# Patient Record
Sex: FEMALE | Race: WHITE | Employment: OTHER | URBAN - METROPOLITAN AREA
[De-identification: names, ages, dates, MRNs, and addresses within clinical notes are randomized per-mention and may not be internally consistent; named-entity substitution may affect disease eponyms.]

---

## 2017-02-10 ENCOUNTER — OFFICE VISIT (OUTPATIENT)
Dept: ORTHOPEDIC SURGERY | Age: 77
End: 2017-02-10

## 2017-02-10 VITALS
SYSTOLIC BLOOD PRESSURE: 136 MMHG | WEIGHT: 137 LBS | DIASTOLIC BLOOD PRESSURE: 73 MMHG | BODY MASS INDEX: 23.52 KG/M2 | HEART RATE: 75 BPM

## 2017-02-10 DIAGNOSIS — M54.31 SCIATICA OF RIGHT SIDE: ICD-10-CM

## 2017-02-10 DIAGNOSIS — M17.11 PRIMARY OSTEOARTHRITIS OF RIGHT KNEE: ICD-10-CM

## 2017-02-10 DIAGNOSIS — M25.561 RIGHT KNEE PAIN, UNSPECIFIED CHRONICITY: Primary | ICD-10-CM

## 2017-02-10 PROCEDURE — G8484 FLU IMMUNIZE NO ADMIN: HCPCS | Performed by: PHYSICIAN ASSISTANT

## 2017-02-10 PROCEDURE — 3017F COLORECTAL CA SCREEN DOC REV: CPT | Performed by: PHYSICIAN ASSISTANT

## 2017-02-10 PROCEDURE — G8420 CALC BMI NORM PARAMETERS: HCPCS | Performed by: PHYSICIAN ASSISTANT

## 2017-02-10 PROCEDURE — 99203 OFFICE O/P NEW LOW 30 MIN: CPT | Performed by: PHYSICIAN ASSISTANT

## 2017-02-10 PROCEDURE — 4040F PNEUMOC VAC/ADMIN/RCVD: CPT | Performed by: PHYSICIAN ASSISTANT

## 2017-02-10 PROCEDURE — G8400 PT W/DXA NO RESULTS DOC: HCPCS | Performed by: PHYSICIAN ASSISTANT

## 2017-02-10 PROCEDURE — 1090F PRES/ABSN URINE INCON ASSESS: CPT | Performed by: PHYSICIAN ASSISTANT

## 2017-02-10 PROCEDURE — 1036F TOBACCO NON-USER: CPT | Performed by: PHYSICIAN ASSISTANT

## 2017-02-10 PROCEDURE — 1123F ACP DISCUSS/DSCN MKR DOCD: CPT | Performed by: PHYSICIAN ASSISTANT

## 2017-02-10 PROCEDURE — 3014F SCREEN MAMMO DOC REV: CPT | Performed by: PHYSICIAN ASSISTANT

## 2017-02-10 PROCEDURE — 73564 X-RAY EXAM KNEE 4 OR MORE: CPT | Performed by: PHYSICIAN ASSISTANT

## 2017-02-10 PROCEDURE — G8427 DOCREV CUR MEDS BY ELIG CLIN: HCPCS | Performed by: PHYSICIAN ASSISTANT

## 2017-02-10 RX ORDER — METHYLPREDNISOLONE 4 MG/1
TABLET ORAL
Qty: 1 KIT | Refills: 0 | Status: SHIPPED | OUTPATIENT
Start: 2017-02-10 | End: 2017-03-03 | Stop reason: ALTCHOICE

## 2017-02-10 RX ORDER — TIZANIDINE 4 MG/1
4 TABLET ORAL EVERY 8 HOURS PRN
Qty: 30 TABLET | Refills: 0 | Status: SHIPPED | OUTPATIENT
Start: 2017-02-10 | End: 2017-05-08

## 2017-02-15 ENCOUNTER — HOSPITAL ENCOUNTER (OUTPATIENT)
Dept: MRI IMAGING | Age: 77
Discharge: HOME | End: 2017-02-15

## 2017-02-15 DIAGNOSIS — M25.561 RIGHT KNEE PAIN, UNSPECIFIED CHRONICITY: ICD-10-CM

## 2017-02-27 ENCOUNTER — TELEPHONE (OUTPATIENT)
Dept: ORTHOPEDIC SURGERY | Age: 77
End: 2017-02-27

## 2017-02-27 RX ORDER — PREDNISONE 10 MG/1
TABLET ORAL
Qty: 35 TABLET | Refills: 1 | Status: SHIPPED | OUTPATIENT
Start: 2017-02-27 | End: 2017-05-08

## 2017-02-27 RX ORDER — METHYLPREDNISOLONE 4 MG/1
TABLET ORAL
Qty: 1 KIT | Refills: 0 | Status: SHIPPED | OUTPATIENT
Start: 2017-02-27 | End: 2017-03-05

## 2017-03-03 ENCOUNTER — OFFICE VISIT (OUTPATIENT)
Dept: ORTHOPEDIC SURGERY | Age: 77
End: 2017-03-03

## 2017-03-03 VITALS
DIASTOLIC BLOOD PRESSURE: 78 MMHG | SYSTOLIC BLOOD PRESSURE: 158 MMHG | WEIGHT: 136.91 LBS | HEIGHT: 64 IN | BODY MASS INDEX: 23.37 KG/M2 | HEART RATE: 57 BPM

## 2017-03-03 DIAGNOSIS — M25.561 ACUTE PAIN OF RIGHT KNEE: ICD-10-CM

## 2017-03-03 DIAGNOSIS — M54.16 LUMBAR RADICULOPATHY: ICD-10-CM

## 2017-03-03 DIAGNOSIS — M17.11 PRIMARY OSTEOARTHRITIS OF RIGHT KNEE: Primary | ICD-10-CM

## 2017-03-03 DIAGNOSIS — M22.2X1 PATELLOFEMORAL STRESS SYNDROME OF RIGHT KNEE: ICD-10-CM

## 2017-03-03 PROCEDURE — L1812 KO ELASTIC W/JOINTS PRE OTS: HCPCS | Performed by: PHYSICIAN ASSISTANT

## 2017-03-03 PROCEDURE — G8420 CALC BMI NORM PARAMETERS: HCPCS | Performed by: PHYSICIAN ASSISTANT

## 2017-03-03 PROCEDURE — 99213 OFFICE O/P EST LOW 20 MIN: CPT | Performed by: PHYSICIAN ASSISTANT

## 2017-03-03 PROCEDURE — 1090F PRES/ABSN URINE INCON ASSESS: CPT | Performed by: PHYSICIAN ASSISTANT

## 2017-03-03 PROCEDURE — G8427 DOCREV CUR MEDS BY ELIG CLIN: HCPCS | Performed by: PHYSICIAN ASSISTANT

## 2017-03-03 PROCEDURE — 1036F TOBACCO NON-USER: CPT | Performed by: PHYSICIAN ASSISTANT

## 2017-03-03 PROCEDURE — 4040F PNEUMOC VAC/ADMIN/RCVD: CPT | Performed by: PHYSICIAN ASSISTANT

## 2017-03-03 PROCEDURE — G8484 FLU IMMUNIZE NO ADMIN: HCPCS | Performed by: PHYSICIAN ASSISTANT

## 2017-03-03 PROCEDURE — 3017F COLORECTAL CA SCREEN DOC REV: CPT | Performed by: PHYSICIAN ASSISTANT

## 2017-03-03 PROCEDURE — 1123F ACP DISCUSS/DSCN MKR DOCD: CPT | Performed by: PHYSICIAN ASSISTANT

## 2017-03-03 PROCEDURE — G8400 PT W/DXA NO RESULTS DOC: HCPCS | Performed by: PHYSICIAN ASSISTANT

## 2017-03-03 PROCEDURE — 3014F SCREEN MAMMO DOC REV: CPT | Performed by: PHYSICIAN ASSISTANT

## 2017-03-10 ENCOUNTER — HOSPITAL ENCOUNTER (OUTPATIENT)
Dept: PHYSICAL THERAPY | Age: 77
Discharge: HOME | End: 2017-03-31
Admitting: ORTHOPAEDIC SURGERY

## 2017-03-17 ENCOUNTER — HOSPITAL ENCOUNTER (OUTPATIENT)
Dept: PHYSICAL THERAPY | Age: 77
Discharge: HOME | End: 2017-03-17
Admitting: ORTHOPAEDIC SURGERY

## 2017-03-21 ENCOUNTER — HOSPITAL ENCOUNTER (OUTPATIENT)
Dept: PHYSICAL THERAPY | Age: 77
Discharge: HOME | End: 2017-03-21
Admitting: ORTHOPAEDIC SURGERY

## 2017-03-23 ENCOUNTER — HOSPITAL ENCOUNTER (OUTPATIENT)
Dept: PHYSICAL THERAPY | Age: 77
Discharge: HOME | End: 2017-03-23
Admitting: ORTHOPAEDIC SURGERY

## 2017-03-28 ENCOUNTER — HOSPITAL ENCOUNTER (OUTPATIENT)
Dept: PHYSICAL THERAPY | Age: 77
Discharge: HOME | End: 2017-03-28
Admitting: ORTHOPAEDIC SURGERY

## 2017-03-31 ENCOUNTER — HOSPITAL ENCOUNTER (OUTPATIENT)
Dept: PHYSICAL THERAPY | Age: 77
Discharge: HOME | End: 2017-03-31
Admitting: ORTHOPAEDIC SURGERY

## 2017-04-04 ENCOUNTER — HOSPITAL ENCOUNTER (OUTPATIENT)
Dept: PHYSICAL THERAPY | Age: 77
Discharge: HOME | End: 2017-04-04
Admitting: ORTHOPAEDIC SURGERY

## 2017-04-06 ENCOUNTER — HOSPITAL ENCOUNTER (OUTPATIENT)
Dept: PHYSICAL THERAPY | Age: 77
Discharge: HOME | End: 2017-04-06
Admitting: ORTHOPAEDIC SURGERY

## 2017-04-11 ENCOUNTER — HOSPITAL ENCOUNTER (OUTPATIENT)
Dept: PHYSICAL THERAPY | Age: 77
Discharge: HOME | End: 2017-04-11
Admitting: ORTHOPAEDIC SURGERY

## 2017-04-13 ENCOUNTER — OFFICE VISIT (OUTPATIENT)
Dept: ORTHOPEDIC SURGERY | Age: 77
End: 2017-04-13

## 2017-04-13 DIAGNOSIS — M22.2X1 PATELLOFEMORAL STRESS SYNDROME OF RIGHT KNEE: ICD-10-CM

## 2017-04-13 DIAGNOSIS — M54.16 LUMBAR RADICULOPATHY: Primary | ICD-10-CM

## 2017-04-13 DIAGNOSIS — M17.11 PRIMARY OSTEOARTHRITIS OF RIGHT KNEE: ICD-10-CM

## 2017-04-13 PROCEDURE — G8427 DOCREV CUR MEDS BY ELIG CLIN: HCPCS | Performed by: PHYSICIAN ASSISTANT

## 2017-04-13 PROCEDURE — 3017F COLORECTAL CA SCREEN DOC REV: CPT | Performed by: PHYSICIAN ASSISTANT

## 2017-04-13 PROCEDURE — G8400 PT W/DXA NO RESULTS DOC: HCPCS | Performed by: PHYSICIAN ASSISTANT

## 2017-04-13 PROCEDURE — 1090F PRES/ABSN URINE INCON ASSESS: CPT | Performed by: PHYSICIAN ASSISTANT

## 2017-04-13 PROCEDURE — 1123F ACP DISCUSS/DSCN MKR DOCD: CPT | Performed by: PHYSICIAN ASSISTANT

## 2017-04-13 PROCEDURE — G8420 CALC BMI NORM PARAMETERS: HCPCS | Performed by: PHYSICIAN ASSISTANT

## 2017-04-13 PROCEDURE — 1036F TOBACCO NON-USER: CPT | Performed by: PHYSICIAN ASSISTANT

## 2017-04-13 PROCEDURE — 20611 DRAIN/INJ JOINT/BURSA W/US: CPT | Performed by: PHYSICIAN ASSISTANT

## 2017-04-13 PROCEDURE — 99213 OFFICE O/P EST LOW 20 MIN: CPT | Performed by: PHYSICIAN ASSISTANT

## 2017-04-13 PROCEDURE — 4040F PNEUMOC VAC/ADMIN/RCVD: CPT | Performed by: PHYSICIAN ASSISTANT

## 2017-04-13 RX ORDER — DIAZEPAM 5 MG/1
TABLET ORAL
Qty: 1 TABLET | Refills: 0 | Status: SHIPPED | OUTPATIENT
Start: 2017-04-13 | End: 2017-04-13 | Stop reason: CLARIF

## 2017-04-13 RX ORDER — DIAZEPAM 5 MG/1
TABLET ORAL
Qty: 2 TABLET | Refills: 0 | Status: SHIPPED | OUTPATIENT
Start: 2017-04-13 | End: 2017-05-08

## 2017-04-21 ENCOUNTER — HOSPITAL ENCOUNTER (OUTPATIENT)
Dept: MRI IMAGING | Age: 77
Discharge: HOME | End: 2017-04-21

## 2017-04-21 DIAGNOSIS — M54.16 LUMBAR RADICULOPATHY: ICD-10-CM

## 2017-04-24 ENCOUNTER — OFFICE VISIT (OUTPATIENT)
Dept: ORTHOPEDIC SURGERY | Age: 77
End: 2017-04-24

## 2017-04-24 VITALS
BODY MASS INDEX: 23.37 KG/M2 | SYSTOLIC BLOOD PRESSURE: 135 MMHG | HEIGHT: 64 IN | HEART RATE: 60 BPM | DIASTOLIC BLOOD PRESSURE: 69 MMHG | WEIGHT: 136.91 LBS

## 2017-04-24 DIAGNOSIS — M54.16 LUMBAR RADICULOPATHY: Primary | ICD-10-CM

## 2017-04-24 PROCEDURE — G8400 PT W/DXA NO RESULTS DOC: HCPCS | Performed by: ORTHOPAEDIC SURGERY

## 2017-04-24 PROCEDURE — 1123F ACP DISCUSS/DSCN MKR DOCD: CPT | Performed by: ORTHOPAEDIC SURGERY

## 2017-04-24 PROCEDURE — G8420 CALC BMI NORM PARAMETERS: HCPCS | Performed by: ORTHOPAEDIC SURGERY

## 2017-04-24 PROCEDURE — 3017F COLORECTAL CA SCREEN DOC REV: CPT | Performed by: ORTHOPAEDIC SURGERY

## 2017-04-24 PROCEDURE — 1090F PRES/ABSN URINE INCON ASSESS: CPT | Performed by: ORTHOPAEDIC SURGERY

## 2017-04-24 PROCEDURE — G8427 DOCREV CUR MEDS BY ELIG CLIN: HCPCS | Performed by: ORTHOPAEDIC SURGERY

## 2017-04-24 PROCEDURE — 4040F PNEUMOC VAC/ADMIN/RCVD: CPT | Performed by: ORTHOPAEDIC SURGERY

## 2017-04-24 PROCEDURE — 1036F TOBACCO NON-USER: CPT | Performed by: ORTHOPAEDIC SURGERY

## 2017-04-24 PROCEDURE — 99213 OFFICE O/P EST LOW 20 MIN: CPT | Performed by: ORTHOPAEDIC SURGERY

## 2017-05-08 ENCOUNTER — OFFICE VISIT (OUTPATIENT)
Dept: ORTHOPEDIC SURGERY | Age: 77
End: 2017-05-08

## 2017-05-08 VITALS
BODY MASS INDEX: 21.34 KG/M2 | SYSTOLIC BLOOD PRESSURE: 126 MMHG | WEIGHT: 125 LBS | HEART RATE: 67 BPM | DIASTOLIC BLOOD PRESSURE: 61 MMHG | HEIGHT: 64 IN

## 2017-05-08 DIAGNOSIS — S39.012A LUMBAR STRAIN, INITIAL ENCOUNTER: Primary | ICD-10-CM

## 2017-05-08 PROCEDURE — G8419 CALC BMI OUT NRM PARAM NOF/U: HCPCS | Performed by: PHYSICAL MEDICINE & REHABILITATION

## 2017-05-08 PROCEDURE — 1090F PRES/ABSN URINE INCON ASSESS: CPT | Performed by: PHYSICAL MEDICINE & REHABILITATION

## 2017-05-08 PROCEDURE — G8400 PT W/DXA NO RESULTS DOC: HCPCS | Performed by: PHYSICAL MEDICINE & REHABILITATION

## 2017-05-08 PROCEDURE — 99214 OFFICE O/P EST MOD 30 MIN: CPT | Performed by: PHYSICAL MEDICINE & REHABILITATION

## 2017-05-08 PROCEDURE — 1036F TOBACCO NON-USER: CPT | Performed by: PHYSICAL MEDICINE & REHABILITATION

## 2017-05-08 PROCEDURE — 1123F ACP DISCUSS/DSCN MKR DOCD: CPT | Performed by: PHYSICAL MEDICINE & REHABILITATION

## 2017-05-08 PROCEDURE — 3017F COLORECTAL CA SCREEN DOC REV: CPT | Performed by: PHYSICAL MEDICINE & REHABILITATION

## 2017-05-08 PROCEDURE — 4040F PNEUMOC VAC/ADMIN/RCVD: CPT | Performed by: PHYSICAL MEDICINE & REHABILITATION

## 2017-05-08 PROCEDURE — G8427 DOCREV CUR MEDS BY ELIG CLIN: HCPCS | Performed by: PHYSICAL MEDICINE & REHABILITATION

## 2017-05-08 RX ORDER — MONTELUKAST SODIUM 10 MG/1
10 TABLET ORAL NIGHTLY
COMMUNITY
End: 2019-08-20 | Stop reason: SDUPTHER

## 2018-02-05 ENCOUNTER — OFFICE VISIT (OUTPATIENT)
Dept: ORTHOPEDIC SURGERY | Age: 78
End: 2018-02-05

## 2018-02-05 VITALS — HEIGHT: 64 IN | BODY MASS INDEX: 22.71 KG/M2 | WEIGHT: 133 LBS

## 2018-02-05 DIAGNOSIS — M17.11 PRIMARY OSTEOARTHRITIS OF RIGHT KNEE: Primary | ICD-10-CM

## 2018-02-05 DIAGNOSIS — M25.561 RIGHT KNEE PAIN, UNSPECIFIED CHRONICITY: ICD-10-CM

## 2018-02-05 PROCEDURE — G8420 CALC BMI NORM PARAMETERS: HCPCS | Performed by: PHYSICIAN ASSISTANT

## 2018-02-05 PROCEDURE — 4040F PNEUMOC VAC/ADMIN/RCVD: CPT | Performed by: PHYSICIAN ASSISTANT

## 2018-02-05 PROCEDURE — G8428 CUR MEDS NOT DOCUMENT: HCPCS | Performed by: PHYSICIAN ASSISTANT

## 2018-02-05 PROCEDURE — 20611 DRAIN/INJ JOINT/BURSA W/US: CPT | Performed by: PHYSICIAN ASSISTANT

## 2018-02-05 PROCEDURE — 3017F COLORECTAL CA SCREEN DOC REV: CPT | Performed by: PHYSICIAN ASSISTANT

## 2018-02-05 PROCEDURE — 1123F ACP DISCUSS/DSCN MKR DOCD: CPT | Performed by: PHYSICIAN ASSISTANT

## 2018-02-05 PROCEDURE — G8400 PT W/DXA NO RESULTS DOC: HCPCS | Performed by: PHYSICIAN ASSISTANT

## 2018-02-05 PROCEDURE — 99213 OFFICE O/P EST LOW 20 MIN: CPT | Performed by: PHYSICIAN ASSISTANT

## 2018-02-05 PROCEDURE — 1036F TOBACCO NON-USER: CPT | Performed by: PHYSICIAN ASSISTANT

## 2018-02-05 PROCEDURE — 1090F PRES/ABSN URINE INCON ASSESS: CPT | Performed by: PHYSICIAN ASSISTANT

## 2018-02-05 PROCEDURE — G8484 FLU IMMUNIZE NO ADMIN: HCPCS | Performed by: PHYSICIAN ASSISTANT

## 2018-08-20 ENCOUNTER — TELEPHONE (OUTPATIENT)
Dept: FAMILY MEDICINE CLINIC | Age: 78
End: 2018-08-20

## 2018-08-20 NOTE — TELEPHONE ENCOUNTER
Patient called wanting to be a patient with Dr ARMSTRONGRORO [facility]  She has a friend that told her that she had spoken with Dr ARMSTRONGRORO [facility] and Dr ARMSTRONGRORO [facility] ok'd it  Friends initials ROMI  Patient aware next new pt appt is in January

## 2019-01-29 ENCOUNTER — OFFICE VISIT (OUTPATIENT)
Dept: FAMILY MEDICINE CLINIC | Age: 79
End: 2019-01-29
Payer: MEDICARE

## 2019-01-29 VITALS
BODY MASS INDEX: 23.73 KG/M2 | HEART RATE: 61 BPM | WEIGHT: 139 LBS | HEIGHT: 64 IN | SYSTOLIC BLOOD PRESSURE: 134 MMHG | OXYGEN SATURATION: 97 % | DIASTOLIC BLOOD PRESSURE: 70 MMHG

## 2019-01-29 DIAGNOSIS — R73.09 ELEVATED GLUCOSE: ICD-10-CM

## 2019-01-29 DIAGNOSIS — M85.80 OSTEOPENIA, UNSPECIFIED LOCATION: ICD-10-CM

## 2019-01-29 DIAGNOSIS — F51.04 CHRONIC INSOMNIA: Primary | ICD-10-CM

## 2019-01-29 DIAGNOSIS — I10 ESSENTIAL HYPERTENSION: ICD-10-CM

## 2019-01-29 DIAGNOSIS — T75.3XXA MOTION SICKNESS, INITIAL ENCOUNTER: ICD-10-CM

## 2019-01-29 DIAGNOSIS — E78.2 MIXED HYPERLIPIDEMIA: ICD-10-CM

## 2019-01-29 DIAGNOSIS — N18.30 CHRONIC KIDNEY DISEASE, STAGE III (MODERATE) (HCC): ICD-10-CM

## 2019-01-29 DIAGNOSIS — Z78.0 POST-MENOPAUSAL: ICD-10-CM

## 2019-01-29 PROBLEM — E78.5 HYPERLIPIDEMIA: Status: ACTIVE | Noted: 2019-01-29

## 2019-01-29 PROBLEM — K21.9 GERD (GASTROESOPHAGEAL REFLUX DISEASE): Status: ACTIVE | Noted: 2019-01-29

## 2019-01-29 PROBLEM — J45.909 EXTRINSIC ASTHMA: Status: ACTIVE | Noted: 2019-01-29

## 2019-01-29 PROBLEM — K14.0 GLOSSITIS: Status: ACTIVE | Noted: 2017-03-16

## 2019-01-29 LAB
A/G RATIO: 2.2 (ref 1.1–2.2)
ALBUMIN SERPL-MCNC: 4.6 G/DL (ref 3.4–5)
ALP BLD-CCNC: 62 U/L (ref 40–129)
ALT SERPL-CCNC: 23 U/L (ref 10–40)
ANION GAP SERPL CALCULATED.3IONS-SCNC: 14 MMOL/L (ref 3–16)
AST SERPL-CCNC: 26 U/L (ref 15–37)
BASOPHILS ABSOLUTE: 0.1 K/UL (ref 0–0.2)
BASOPHILS RELATIVE PERCENT: 1 %
BILIRUB SERPL-MCNC: 0.3 MG/DL (ref 0–1)
BUN BLDV-MCNC: 24 MG/DL (ref 7–20)
CALCIUM SERPL-MCNC: 10.4 MG/DL (ref 8.3–10.6)
CHLORIDE BLD-SCNC: 100 MMOL/L (ref 99–110)
CHOLESTEROL, TOTAL: 169 MG/DL (ref 0–199)
CO2: 26 MMOL/L (ref 21–32)
CREAT SERPL-MCNC: 1.3 MG/DL (ref 0.6–1.2)
EOSINOPHILS ABSOLUTE: 0.2 K/UL (ref 0–0.6)
EOSINOPHILS RELATIVE PERCENT: 3.6 %
GFR AFRICAN AMERICAN: 48
GFR NON-AFRICAN AMERICAN: 40
GLOBULIN: 2.1 G/DL
GLUCOSE BLD-MCNC: 259 MG/DL (ref 70–99)
HCT VFR BLD CALC: 43.6 % (ref 36–48)
HDLC SERPL-MCNC: 31 MG/DL (ref 40–60)
HEMOGLOBIN: 14.6 G/DL (ref 12–16)
LDL CHOLESTEROL CALCULATED: ABNORMAL MG/DL
LDL CHOLESTEROL DIRECT: 107 MG/DL
LYMPHOCYTES ABSOLUTE: 1.7 K/UL (ref 1–5.1)
LYMPHOCYTES RELATIVE PERCENT: 32 %
MCH RBC QN AUTO: 29.5 PG (ref 26–34)
MCHC RBC AUTO-ENTMCNC: 33.6 G/DL (ref 31–36)
MCV RBC AUTO: 88 FL (ref 80–100)
MONOCYTES ABSOLUTE: 0.3 K/UL (ref 0–1.3)
MONOCYTES RELATIVE PERCENT: 6.6 %
NEUTROPHILS ABSOLUTE: 3 K/UL (ref 1.7–7.7)
NEUTROPHILS RELATIVE PERCENT: 56.8 %
PDW BLD-RTO: 14.4 % (ref 12.4–15.4)
PLATELET # BLD: 242 K/UL (ref 135–450)
PMV BLD AUTO: 7.9 FL (ref 5–10.5)
POTASSIUM SERPL-SCNC: 4 MMOL/L (ref 3.5–5.1)
RBC # BLD: 4.95 M/UL (ref 4–5.2)
SODIUM BLD-SCNC: 140 MMOL/L (ref 136–145)
TOTAL PROTEIN: 6.7 G/DL (ref 6.4–8.2)
TRIGL SERPL-MCNC: 341 MG/DL (ref 0–150)
TSH REFLEX: 2.07 UIU/ML (ref 0.27–4.2)
VITAMIN D 25-HYDROXY: 37.6 NG/ML
VLDLC SERPL CALC-MCNC: ABNORMAL MG/DL
WBC # BLD: 5.2 K/UL (ref 4–11)

## 2019-01-29 PROCEDURE — 99204 OFFICE O/P NEW MOD 45 MIN: CPT | Performed by: FAMILY MEDICINE

## 2019-01-29 PROCEDURE — 1090F PRES/ABSN URINE INCON ASSESS: CPT | Performed by: FAMILY MEDICINE

## 2019-01-29 PROCEDURE — G8427 DOCREV CUR MEDS BY ELIG CLIN: HCPCS | Performed by: FAMILY MEDICINE

## 2019-01-29 PROCEDURE — G8400 PT W/DXA NO RESULTS DOC: HCPCS | Performed by: FAMILY MEDICINE

## 2019-01-29 PROCEDURE — G8420 CALC BMI NORM PARAMETERS: HCPCS | Performed by: FAMILY MEDICINE

## 2019-01-29 PROCEDURE — 1036F TOBACCO NON-USER: CPT | Performed by: FAMILY MEDICINE

## 2019-01-29 PROCEDURE — 4040F PNEUMOC VAC/ADMIN/RCVD: CPT | Performed by: FAMILY MEDICINE

## 2019-01-29 PROCEDURE — 1123F ACP DISCUSS/DSCN MKR DOCD: CPT | Performed by: FAMILY MEDICINE

## 2019-01-29 PROCEDURE — G8482 FLU IMMUNIZE ORDER/ADMIN: HCPCS | Performed by: FAMILY MEDICINE

## 2019-01-29 PROCEDURE — 1101F PT FALLS ASSESS-DOCD LE1/YR: CPT | Performed by: FAMILY MEDICINE

## 2019-01-29 RX ORDER — AMITRIPTYLINE HYDROCHLORIDE 25 MG/1
25 TABLET, FILM COATED ORAL NIGHTLY PRN
Qty: 30 TABLET | Refills: 2 | Status: SHIPPED | OUTPATIENT
Start: 2019-01-29 | End: 2019-04-01

## 2019-01-29 RX ORDER — SCOLOPAMINE TRANSDERMAL SYSTEM 1 MG/1
1 PATCH, EXTENDED RELEASE TRANSDERMAL
Qty: 3 PATCH | Refills: 0 | Status: SHIPPED | OUTPATIENT
Start: 2019-01-29 | End: 2019-03-08

## 2019-01-29 ASSESSMENT — PATIENT HEALTH QUESTIONNAIRE - PHQ9
1. LITTLE INTEREST OR PLEASURE IN DOING THINGS: 0
SUM OF ALL RESPONSES TO PHQ9 QUESTIONS 1 & 2: 0
SUM OF ALL RESPONSES TO PHQ QUESTIONS 1-9: 0
2. FEELING DOWN, DEPRESSED OR HOPELESS: 0
SUM OF ALL RESPONSES TO PHQ QUESTIONS 1-9: 0

## 2019-01-30 LAB
ESTIMATED AVERAGE GLUCOSE: 171.4 MG/DL
HBA1C MFR BLD: 7.6 %

## 2019-01-31 ENCOUNTER — OFFICE VISIT (OUTPATIENT)
Dept: PSYCHOLOGY | Age: 79
End: 2019-01-31
Payer: MEDICARE

## 2019-01-31 DIAGNOSIS — F51.04 PSYCHOPHYSIOLOGIC INSOMNIA: Primary | ICD-10-CM

## 2019-01-31 PROCEDURE — 90791 PSYCH DIAGNOSTIC EVALUATION: CPT | Performed by: PSYCHOLOGIST

## 2019-01-31 ASSESSMENT — ANXIETY QUESTIONNAIRES
7. FEELING AFRAID AS IF SOMETHING AWFUL MIGHT HAPPEN: 0-NOT AT ALL SURE
1. FEELING NERVOUS, ANXIOUS, OR ON EDGE: 0-NOT AT ALL SURE
4. TROUBLE RELAXING: 1-SEVERAL DAYS
2. NOT BEING ABLE TO STOP OR CONTROL WORRYING: 1-SEVERAL DAYS
6. BECOMING EASILY ANNOYED OR IRRITABLE: 1-SEVERAL DAYS
3. WORRYING TOO MUCH ABOUT DIFFERENT THINGS: 1-SEVERAL DAYS
GAD7 TOTAL SCORE: 4
5. BEING SO RESTLESS THAT IT IS HARD TO SIT STILL: 0-NOT AT ALL SURE

## 2019-01-31 ASSESSMENT — PATIENT HEALTH QUESTIONNAIRE - PHQ9
SUM OF ALL RESPONSES TO PHQ QUESTIONS 1-9: 2
SUM OF ALL RESPONSES TO PHQ QUESTIONS 1-9: 2
SUM OF ALL RESPONSES TO PHQ9 QUESTIONS 1 & 2: 2
1. LITTLE INTEREST OR PLEASURE IN DOING THINGS: 1
2. FEELING DOWN, DEPRESSED OR HOPELESS: 1

## 2019-02-04 PROBLEM — M25.561 ACUTE PAIN OF RIGHT KNEE: Status: RESOLVED | Noted: 2017-03-03 | Resolved: 2019-02-04

## 2019-02-04 PROBLEM — M22.2X1 PATELLOFEMORAL STRESS SYNDROME OF RIGHT KNEE: Status: RESOLVED | Noted: 2017-03-03 | Resolved: 2019-02-04

## 2019-02-04 PROBLEM — K14.0 GLOSSITIS: Status: RESOLVED | Noted: 2017-03-16 | Resolved: 2019-02-04

## 2019-02-04 PROBLEM — M54.16 LUMBAR RADICULOPATHY: Status: RESOLVED | Noted: 2017-03-03 | Resolved: 2019-02-04

## 2019-02-04 ASSESSMENT — ENCOUNTER SYMPTOMS
EYES NEGATIVE: 1
ALLERGIC/IMMUNOLOGIC NEGATIVE: 1
GASTROINTESTINAL NEGATIVE: 1
RESPIRATORY NEGATIVE: 1

## 2019-02-05 ENCOUNTER — OFFICE VISIT (OUTPATIENT)
Dept: FAMILY MEDICINE CLINIC | Age: 79
End: 2019-02-05
Payer: MEDICARE

## 2019-02-05 VITALS
BODY MASS INDEX: 23.83 KG/M2 | HEART RATE: 64 BPM | HEIGHT: 64 IN | SYSTOLIC BLOOD PRESSURE: 130 MMHG | WEIGHT: 139.6 LBS | OXYGEN SATURATION: 97 % | DIASTOLIC BLOOD PRESSURE: 60 MMHG

## 2019-02-05 DIAGNOSIS — E78.5 DM TYPE 2 WITH DIABETIC DYSLIPIDEMIA (HCC): ICD-10-CM

## 2019-02-05 DIAGNOSIS — E11.69 DM TYPE 2 WITH DIABETIC DYSLIPIDEMIA (HCC): ICD-10-CM

## 2019-02-05 DIAGNOSIS — E11.65 UNCONTROLLED TYPE 2 DIABETES MELLITUS WITH HYPERGLYCEMIA (HCC): Primary | ICD-10-CM

## 2019-02-05 DIAGNOSIS — I10 ESSENTIAL HYPERTENSION: ICD-10-CM

## 2019-02-05 DIAGNOSIS — N18.30 CHRONIC KIDNEY DISEASE, STAGE III (MODERATE) (HCC): ICD-10-CM

## 2019-02-05 LAB
CREATININE URINE POCT: 100
MICROALBUMIN/CREAT 24H UR: 10 MG/G{CREAT}
MICROALBUMIN/CREAT UR-RTO: <30

## 2019-02-05 PROCEDURE — 1101F PT FALLS ASSESS-DOCD LE1/YR: CPT | Performed by: FAMILY MEDICINE

## 2019-02-05 PROCEDURE — 4040F PNEUMOC VAC/ADMIN/RCVD: CPT | Performed by: FAMILY MEDICINE

## 2019-02-05 PROCEDURE — 1036F TOBACCO NON-USER: CPT | Performed by: FAMILY MEDICINE

## 2019-02-05 PROCEDURE — G8482 FLU IMMUNIZE ORDER/ADMIN: HCPCS | Performed by: FAMILY MEDICINE

## 2019-02-05 PROCEDURE — 1090F PRES/ABSN URINE INCON ASSESS: CPT | Performed by: FAMILY MEDICINE

## 2019-02-05 PROCEDURE — G8428 CUR MEDS NOT DOCUMENT: HCPCS | Performed by: FAMILY MEDICINE

## 2019-02-05 PROCEDURE — 82044 UR ALBUMIN SEMIQUANTITATIVE: CPT | Performed by: FAMILY MEDICINE

## 2019-02-05 PROCEDURE — G8420 CALC BMI NORM PARAMETERS: HCPCS | Performed by: FAMILY MEDICINE

## 2019-02-05 PROCEDURE — 99215 OFFICE O/P EST HI 40 MIN: CPT | Performed by: FAMILY MEDICINE

## 2019-02-05 PROCEDURE — 1123F ACP DISCUSS/DSCN MKR DOCD: CPT | Performed by: FAMILY MEDICINE

## 2019-02-05 PROCEDURE — G8400 PT W/DXA NO RESULTS DOC: HCPCS | Performed by: FAMILY MEDICINE

## 2019-02-05 RX ORDER — LISINOPRIL 10 MG/1
10 TABLET ORAL DAILY
Qty: 30 TABLET | Refills: 2 | Status: SHIPPED | OUTPATIENT
Start: 2019-02-05 | End: 2019-02-14

## 2019-02-05 RX ORDER — ATORVASTATIN CALCIUM 40 MG/1
40 TABLET, FILM COATED ORAL DAILY
Qty: 30 TABLET | Refills: 2 | Status: SHIPPED | OUTPATIENT
Start: 2019-02-05 | End: 2019-05-13 | Stop reason: SDUPTHER

## 2019-02-06 ENCOUNTER — TELEPHONE (OUTPATIENT)
Dept: FAMILY MEDICINE CLINIC | Age: 79
End: 2019-02-06

## 2019-02-06 NOTE — TELEPHONE ENCOUNTER
Everything should have printed on her AVS. Please go through her list with her to confirm what she should be taking.

## 2019-02-06 NOTE — TELEPHONE ENCOUNTER
Pt called stating Dr. Nathanael Orr discontinued one of her medications, but then added two more.   Says she is really confused and would like a list of all the medications she should be taking emailed to her at  [email or web address].

## 2019-02-14 ENCOUNTER — OFFICE VISIT (OUTPATIENT)
Dept: FAMILY MEDICINE CLINIC | Age: 79
End: 2019-02-14
Payer: MEDICARE

## 2019-02-14 VITALS
HEART RATE: 69 BPM | BODY MASS INDEX: 24.55 KG/M2 | WEIGHT: 143 LBS | DIASTOLIC BLOOD PRESSURE: 90 MMHG | OXYGEN SATURATION: 96 % | SYSTOLIC BLOOD PRESSURE: 170 MMHG

## 2019-02-14 DIAGNOSIS — N18.30 CHRONIC KIDNEY DISEASE, STAGE III (MODERATE) (HCC): ICD-10-CM

## 2019-02-14 DIAGNOSIS — J20.9 ACUTE BRONCHITIS, UNSPECIFIED ORGANISM: ICD-10-CM

## 2019-02-14 DIAGNOSIS — R60.0 BILATERAL LOWER EXTREMITY EDEMA: ICD-10-CM

## 2019-02-14 DIAGNOSIS — I10 ESSENTIAL HYPERTENSION: Primary | ICD-10-CM

## 2019-02-14 PROCEDURE — 99214 OFFICE O/P EST MOD 30 MIN: CPT | Performed by: FAMILY MEDICINE

## 2019-02-14 PROCEDURE — G8427 DOCREV CUR MEDS BY ELIG CLIN: HCPCS | Performed by: FAMILY MEDICINE

## 2019-02-14 PROCEDURE — G8482 FLU IMMUNIZE ORDER/ADMIN: HCPCS | Performed by: FAMILY MEDICINE

## 2019-02-14 PROCEDURE — G8420 CALC BMI NORM PARAMETERS: HCPCS | Performed by: FAMILY MEDICINE

## 2019-02-14 PROCEDURE — 1123F ACP DISCUSS/DSCN MKR DOCD: CPT | Performed by: FAMILY MEDICINE

## 2019-02-14 PROCEDURE — 4040F PNEUMOC VAC/ADMIN/RCVD: CPT | Performed by: FAMILY MEDICINE

## 2019-02-14 PROCEDURE — 1036F TOBACCO NON-USER: CPT | Performed by: FAMILY MEDICINE

## 2019-02-14 PROCEDURE — G8400 PT W/DXA NO RESULTS DOC: HCPCS | Performed by: FAMILY MEDICINE

## 2019-02-14 PROCEDURE — 1090F PRES/ABSN URINE INCON ASSESS: CPT | Performed by: FAMILY MEDICINE

## 2019-02-14 PROCEDURE — 1101F PT FALLS ASSESS-DOCD LE1/YR: CPT | Performed by: FAMILY MEDICINE

## 2019-02-14 RX ORDER — TORSEMIDE 5 MG/1
5 TABLET ORAL DAILY PRN
Qty: 30 TABLET | Refills: 3 | Status: SHIPPED | OUTPATIENT
Start: 2019-02-14 | End: 2019-03-04

## 2019-02-14 RX ORDER — METOPROLOL SUCCINATE 100 MG/1
100 TABLET, EXTENDED RELEASE ORAL NIGHTLY
Qty: 30 TABLET | Refills: 2 | Status: SHIPPED | OUTPATIENT
Start: 2019-02-14 | End: 2020-03-03 | Stop reason: SDUPTHER

## 2019-02-14 RX ORDER — LOSARTAN POTASSIUM 50 MG/1
50 TABLET ORAL DAILY
Qty: 30 TABLET | Refills: 3 | Status: SHIPPED | OUTPATIENT
Start: 2019-02-14 | End: 2019-03-08

## 2019-02-14 RX ORDER — ALBUTEROL SULFATE 90 UG/1
1-2 AEROSOL, METERED RESPIRATORY (INHALATION) EVERY 4 HOURS PRN
Qty: 1 INHALER | Refills: 0 | Status: SHIPPED | OUTPATIENT
Start: 2019-02-14 | End: 2019-03-08

## 2019-02-15 ENCOUNTER — TELEPHONE (OUTPATIENT)
Dept: FAMILY MEDICINE CLINIC | Age: 79
End: 2019-02-15

## 2019-02-18 ENCOUNTER — OFFICE VISIT (OUTPATIENT)
Dept: FAMILY MEDICINE CLINIC | Age: 79
End: 2019-02-18
Payer: MEDICARE

## 2019-02-18 ENCOUNTER — TELEPHONE (OUTPATIENT)
Dept: FAMILY MEDICINE CLINIC | Age: 79
End: 2019-02-18

## 2019-02-18 VITALS
DIASTOLIC BLOOD PRESSURE: 84 MMHG | HEART RATE: 70 BPM | SYSTOLIC BLOOD PRESSURE: 130 MMHG | BODY MASS INDEX: 24.03 KG/M2 | OXYGEN SATURATION: 95 % | WEIGHT: 140 LBS

## 2019-02-18 DIAGNOSIS — R05.9 COUGH: ICD-10-CM

## 2019-02-18 DIAGNOSIS — J01.90 ACUTE SINUSITIS, RECURRENCE NOT SPECIFIED, UNSPECIFIED LOCATION: Primary | ICD-10-CM

## 2019-02-18 PROCEDURE — 99213 OFFICE O/P EST LOW 20 MIN: CPT | Performed by: FAMILY MEDICINE

## 2019-02-18 PROCEDURE — G8482 FLU IMMUNIZE ORDER/ADMIN: HCPCS | Performed by: FAMILY MEDICINE

## 2019-02-18 PROCEDURE — 1101F PT FALLS ASSESS-DOCD LE1/YR: CPT | Performed by: FAMILY MEDICINE

## 2019-02-18 PROCEDURE — 1090F PRES/ABSN URINE INCON ASSESS: CPT | Performed by: FAMILY MEDICINE

## 2019-02-18 PROCEDURE — G8427 DOCREV CUR MEDS BY ELIG CLIN: HCPCS | Performed by: FAMILY MEDICINE

## 2019-02-18 PROCEDURE — 1036F TOBACCO NON-USER: CPT | Performed by: FAMILY MEDICINE

## 2019-02-18 PROCEDURE — G8400 PT W/DXA NO RESULTS DOC: HCPCS | Performed by: FAMILY MEDICINE

## 2019-02-18 PROCEDURE — G8420 CALC BMI NORM PARAMETERS: HCPCS | Performed by: FAMILY MEDICINE

## 2019-02-18 PROCEDURE — 4040F PNEUMOC VAC/ADMIN/RCVD: CPT | Performed by: FAMILY MEDICINE

## 2019-02-18 PROCEDURE — 1123F ACP DISCUSS/DSCN MKR DOCD: CPT | Performed by: FAMILY MEDICINE

## 2019-02-18 RX ORDER — BENZONATATE 200 MG/1
200 CAPSULE ORAL 3 TIMES DAILY PRN
Qty: 30 CAPSULE | Refills: 1 | Status: SHIPPED | OUTPATIENT
Start: 2019-02-18 | End: 2019-03-08

## 2019-02-18 RX ORDER — DOXYCYCLINE HYCLATE 100 MG
100 TABLET ORAL 2 TIMES DAILY
Qty: 14 TABLET | Refills: 0 | Status: SHIPPED | OUTPATIENT
Start: 2019-02-18 | End: 2019-02-25

## 2019-02-18 ASSESSMENT — ENCOUNTER SYMPTOMS
COUGH: 1
SINUS PRESSURE: 1
SINUS COMPLAINT: 1

## 2019-02-19 ASSESSMENT — ENCOUNTER SYMPTOMS
SHORTNESS OF BREATH: 0
WHEEZING: 1
COUGH: 1
SORE THROAT: 0

## 2019-03-04 ENCOUNTER — TELEPHONE (OUTPATIENT)
Dept: FAMILY MEDICINE CLINIC | Age: 79
End: 2019-03-04

## 2019-03-04 DIAGNOSIS — I10 ESSENTIAL HYPERTENSION: ICD-10-CM

## 2019-03-04 DIAGNOSIS — R60.0 BILATERAL LOWER EXTREMITY EDEMA: ICD-10-CM

## 2019-03-04 RX ORDER — TORSEMIDE 20 MG/1
20 TABLET ORAL DAILY PRN
Qty: 30 TABLET | Refills: 0 | Status: SHIPPED | OUTPATIENT
Start: 2019-03-04 | End: 2019-03-16 | Stop reason: SINTOL

## 2019-03-08 ENCOUNTER — OFFICE VISIT (OUTPATIENT)
Dept: FAMILY MEDICINE CLINIC | Age: 79
End: 2019-03-08
Payer: MEDICARE

## 2019-03-08 VITALS
SYSTOLIC BLOOD PRESSURE: 130 MMHG | DIASTOLIC BLOOD PRESSURE: 80 MMHG | WEIGHT: 139 LBS | BODY MASS INDEX: 23.73 KG/M2 | HEART RATE: 68 BPM | OXYGEN SATURATION: 95 % | HEIGHT: 64 IN

## 2019-03-08 DIAGNOSIS — R60.9 DEPENDENT EDEMA: Primary | ICD-10-CM

## 2019-03-08 DIAGNOSIS — R82.998 FROTHY URINE: ICD-10-CM

## 2019-03-08 DIAGNOSIS — N18.30 CHRONIC KIDNEY DISEASE, STAGE III (MODERATE) (HCC): ICD-10-CM

## 2019-03-08 DIAGNOSIS — I10 ESSENTIAL HYPERTENSION: ICD-10-CM

## 2019-03-08 LAB
ANION GAP SERPL CALCULATED.3IONS-SCNC: 14 MMOL/L (ref 3–16)
BUN BLDV-MCNC: 25 MG/DL (ref 7–20)
CALCIUM SERPL-MCNC: 10.4 MG/DL (ref 8.3–10.6)
CHLORIDE BLD-SCNC: 100 MMOL/L (ref 99–110)
CO2: 30 MMOL/L (ref 21–32)
CREAT SERPL-MCNC: 1.4 MG/DL (ref 0.6–1.2)
GFR AFRICAN AMERICAN: 44
GFR NON-AFRICAN AMERICAN: 36
GLUCOSE BLD-MCNC: 220 MG/DL (ref 70–99)
POTASSIUM SERPL-SCNC: 3.7 MMOL/L (ref 3.5–5.1)
SODIUM BLD-SCNC: 144 MMOL/L (ref 136–145)

## 2019-03-08 PROCEDURE — G8427 DOCREV CUR MEDS BY ELIG CLIN: HCPCS | Performed by: FAMILY MEDICINE

## 2019-03-08 PROCEDURE — 1090F PRES/ABSN URINE INCON ASSESS: CPT | Performed by: FAMILY MEDICINE

## 2019-03-08 PROCEDURE — 4040F PNEUMOC VAC/ADMIN/RCVD: CPT | Performed by: FAMILY MEDICINE

## 2019-03-08 PROCEDURE — 1123F ACP DISCUSS/DSCN MKR DOCD: CPT | Performed by: FAMILY MEDICINE

## 2019-03-08 PROCEDURE — 36415 COLL VENOUS BLD VENIPUNCTURE: CPT | Performed by: FAMILY MEDICINE

## 2019-03-08 PROCEDURE — 1036F TOBACCO NON-USER: CPT | Performed by: FAMILY MEDICINE

## 2019-03-08 PROCEDURE — G8420 CALC BMI NORM PARAMETERS: HCPCS | Performed by: FAMILY MEDICINE

## 2019-03-08 PROCEDURE — 1101F PT FALLS ASSESS-DOCD LE1/YR: CPT | Performed by: FAMILY MEDICINE

## 2019-03-08 PROCEDURE — G8482 FLU IMMUNIZE ORDER/ADMIN: HCPCS | Performed by: FAMILY MEDICINE

## 2019-03-08 PROCEDURE — 99214 OFFICE O/P EST MOD 30 MIN: CPT | Performed by: FAMILY MEDICINE

## 2019-03-08 PROCEDURE — G8400 PT W/DXA NO RESULTS DOC: HCPCS | Performed by: FAMILY MEDICINE

## 2019-03-08 RX ORDER — LOSARTAN POTASSIUM 50 MG/1
50 TABLET ORAL 2 TIMES DAILY
Qty: 60 TABLET | Refills: 0 | Status: SHIPPED | OUTPATIENT
Start: 2019-03-08 | End: 2019-03-16

## 2019-03-15 ENCOUNTER — TELEPHONE (OUTPATIENT)
Dept: FAMILY MEDICINE CLINIC | Age: 79
End: 2019-03-15

## 2019-03-16 RX ORDER — LOSARTAN POTASSIUM 50 MG/1
50 TABLET ORAL DAILY
Qty: 30 TABLET | Refills: 0
Start: 2019-03-16 | End: 2019-05-14

## 2019-03-16 ASSESSMENT — ENCOUNTER SYMPTOMS: SHORTNESS OF BREATH: 0

## 2019-03-20 RX ORDER — TRIAMTERENE AND HYDROCHLOROTHIAZIDE 37.5; 25 MG/1; MG/1
1 TABLET ORAL DAILY
Qty: 30 TABLET | Refills: 1 | Status: CANCELLED | OUTPATIENT
Start: 2019-03-20

## 2019-04-01 ENCOUNTER — OFFICE VISIT (OUTPATIENT)
Dept: FAMILY MEDICINE CLINIC | Age: 79
End: 2019-04-01
Payer: MEDICARE

## 2019-04-01 VITALS
SYSTOLIC BLOOD PRESSURE: 130 MMHG | HEIGHT: 64 IN | HEART RATE: 71 BPM | OXYGEN SATURATION: 98 % | BODY MASS INDEX: 23.83 KG/M2 | DIASTOLIC BLOOD PRESSURE: 70 MMHG | WEIGHT: 139.6 LBS | TEMPERATURE: 97.7 F

## 2019-04-01 DIAGNOSIS — Z00.00 ROUTINE GENERAL MEDICAL EXAMINATION AT A HEALTH CARE FACILITY: Primary | ICD-10-CM

## 2019-04-01 PROCEDURE — G0439 PPPS, SUBSEQ VISIT: HCPCS | Performed by: INTERNAL MEDICINE

## 2019-04-01 PROCEDURE — 4040F PNEUMOC VAC/ADMIN/RCVD: CPT | Performed by: INTERNAL MEDICINE

## 2019-04-01 ASSESSMENT — PATIENT HEALTH QUESTIONNAIRE - PHQ9
SUM OF ALL RESPONSES TO PHQ QUESTIONS 1-9: 0
SUM OF ALL RESPONSES TO PHQ QUESTIONS 1-9: 0

## 2019-04-01 ASSESSMENT — LIFESTYLE VARIABLES
HOW OFTEN DURING THE LAST YEAR HAVE YOU NEEDED AN ALCOHOLIC DRINK FIRST THING IN THE MORNING TO GET YOURSELF GOING AFTER A NIGHT OF HEAVY DRINKING: 0
HOW OFTEN DURING THE LAST YEAR HAVE YOU FAILED TO DO WHAT WAS NORMALLY EXPECTED FROM YOU BECAUSE OF DRINKING: 0
HAS A RELATIVE, FRIEND, DOCTOR, OR ANOTHER HEALTH PROFESSIONAL EXPRESSED CONCERN ABOUT YOUR DRINKING OR SUGGESTED YOU CUT DOWN: 0
HOW OFTEN DURING THE LAST YEAR HAVE YOU BEEN UNABLE TO REMEMBER WHAT HAPPENED THE NIGHT BEFORE BECAUSE YOU HAD BEEN DRINKING: 0
HAVE YOU OR SOMEONE ELSE BEEN INJURED AS A RESULT OF YOUR DRINKING: 0
HOW OFTEN DURING THE LAST YEAR HAVE YOU FOUND THAT YOU WERE NOT ABLE TO STOP DRINKING ONCE YOU HAD STARTED: 0
AUDIT-C TOTAL SCORE: 3
HOW OFTEN DURING THE LAST YEAR HAVE YOU HAD A FEELING OF GUILT OR REMORSE AFTER DRINKING: 0
AUDIT TOTAL SCORE: 3
HOW OFTEN DO YOU HAVE SIX OR MORE DRINKS ON ONE OCCASION: 0
HOW OFTEN DO YOU HAVE A DRINK CONTAINING ALCOHOL: 3
HOW MANY STANDARD DRINKS CONTAINING ALCOHOL DO YOU HAVE ON A TYPICAL DAY: 0

## 2019-04-01 ASSESSMENT — ANXIETY QUESTIONNAIRES: GAD7 TOTAL SCORE: 0

## 2019-04-01 NOTE — PROGRESS NOTES
Medicare Annual Wellness Visit  Name: Paige Castaonn Date: 2019   MRN: [id number] Sex: Female   Age: 70 y.o. Ethnicity: Non-/Non    : 1940 Race: Doris Velazquez is here for Medicare AWV    Screenings for behavioral, psychosocial and functional/safety risks, and cognitive dysfunction are all negative except as indicated below. These results, as well as other patient data from the [address] form, are documented in Flowsheets linked to this Encounter. Allergies   Allergen Reactions    Adhesive Tape      Band aid caused skin tears in past     Codeine Other (See Comments)     Felt strange    Iodides      Some issues with kidneys- told not to have per PCP     Sulfa Antibiotics      Pt can't remember reaction    Penicillins Rash     Prior to Visit Medications    Medication Sig Taking? Authorizing Provider   losartan (COZAAR) 50 MG tablet Take 1 tablet by mouth daily Yes Shun Fernandez MD   metoprolol succinate (TOPROL XL) 100 MG extended release tablet Take 1 tablet by mouth nightly Yes Shun Fernandez MD   atorvastatin (LIPITOR) 40 MG tablet Take 1 tablet by mouth daily Yes Shun Fernandez MD   Probiotic Product (PROBIOTIC DAILY PO) Take by mouth Yes Historical Provider, MD   montelukast (SINGULAIR) 10 MG tablet Take 10 mg by mouth nightly Yes Historical Provider, MD   citalopram (CELEXA) 10 MG tablet Take 10 mg by mouth daily Yes Historical Provider, MD   fenofibrate 160 MG tablet Take 160 mg by mouth daily. Yes Historical Provider, MD   aspirin 81 MG EC tablet Take 81 mg by mouth every evening.  Yes Historical Provider, MD     Past Medical History:   Diagnosis Date    Anal fissure 2013    Back pain     Chronic insomnia     Depression     GERD (gastroesophageal reflux disease)     Dr. Mp Velasquez (GI)    Glossitis 3/16/2017    Hearing loss     Hypercholesteremia     Hypertension     Lumbar radiculopathy 3/3/2017    Patellofemoral stress syndrome of right knee 3/3/2017     Past Surgical History:   Procedure Laterality Date    APPENDECTOMY      CHOLECYSTECTOMY      CHOLECYSTECTOMY      COLONOSCOPY  1/18/2016    Normal    HYSTERECTOMY, TOTAL ABDOMINAL      KNEE SURGERY  left knee    OVARY REMOVAL      unknown which side    UPPER GASTROINTESTINAL ENDOSCOPY  1/18/2016    Normal     Family History   Problem Relation Age of Onset    Stroke Father     Heart Disease Brother     High Blood Pressure Other        CareTeam (Including outside providers/suppliers regularly involved in providing care):   Patient Care Team:  Estela James MD as PCP - General (Family Medicine)    Wt Readings from Last 3 Encounters:   04/01/19 139 lb 9.6 oz (63.3 kg)   03/08/19 139 lb (63 kg)   02/18/19 140 lb (63.5 kg)     Vitals:    04/01/19 1011   BP: 130/70   Site: Left Upper Arm   Position: Sitting   Cuff Size: Medium Adult   Pulse: 71   Temp: 97.7 °F (36.5 °C)   TempSrc: Oral   SpO2: 98%   Weight: 139 lb 9.6 oz (63.3 kg)   Height: 5' 4\" (1.626 m)     Body mass index is 23.96 kg/m². Based upon direct observation of the patient, evaluation of cognition reveals recent and remote memory intact. Patient's complete Health Risk Assessment and screening values have been reviewed and are found in Flowsheets. The following problems were reviewed today and where indicated follow up appointments were made and/or referrals ordered. Positive Risk Factor Screenings with Interventions:     Health Habits/Nutrition:  Health Habits/Nutrition  Do you exercise for at least 20 minutes 2-3 times per week?: (!) No  Have you lost any weight without trying in the past 3 months?: No  Do you eat fewer than 2 meals per day?: No  Have you seen a dentist within the past year?: Yes  Body mass index is 23.96 kg/m².   Health Habits/Nutrition Interventions:  · Inadequate physical activity:  educational materials provided to promote increased physical activity    Hearing/Vision:  Hearing/Vision  Do you or your family notice any trouble with your hearing?: (!) Yes  Do you have difficulty driving, watching TV, or doing any of your daily activities because of your eyesight?: No  Have you had an eye exam within the past year?: Yes  Hearing/Vision Interventions:  · Hearing concerns:  patient declines any further evaluation/treatment for hearing issues, has hearing aides    Safety:  Safety  Do you have working smoke detectors?: Yes  Have all throw rugs been removed or fastened?: Yes  Do you have non-slip mats in all bathtubs?: (!) No  Do all of your stairways have a railing or banister?: Yes  Are your doorways, halls and stairs free of clutter?: Yes  Do you always fasten your seatbelt when you are in a car?: Yes  Safety Interventions:  · Home safety tips provided  · Patient declines any further evaluation/treatment for this issue  · educated on importance     Personalized Preventive Plan   Current Health Maintenance Status  Immunization History   Administered Date(s) Administered    Influenza Vaccine, unspecified formulation 09/28/2015, 10/05/2016, 09/01/2017    Influenza, High Dose (Fluzone 65 yrs and older) 09/01/2018    Pneumococcal 13-valent Conjugate ([id number]) 09/28/2015    Pneumococcal Polysaccharide ([id number]) 03/29/2018    Td, unspecified formulation 01/01/2010    Zoster Live (Zostavax) 01/01/2008        Health Maintenance   Topic Date Due    Shingles Vaccine (2 of 3) 02/26/2008    DTaP/Tdap/Td vaccine (1 - Tdap) 01/02/2010    Potassium monitoring  03/08/2020    Creatinine monitoring  03/08/2020    DEXA (modify frequency per FRAX score)  Completed    Flu vaccine  Completed    Pneumococcal 65+ years Vaccine  Completed     Recommendations for Preventive Services Due: see orders and patient instructions/AVS.  .   Recommended screening schedule for the next 5-10 years is provided to the patient in written form: see Patient Instructions/AVS.    Brittany Quintero LPN, 0/1/8168, performed the documented evaluation under the direct supervision of the attending physician. This encounter was performed under Kim heck MDs, direct supervision, 4/1/2019.

## 2019-04-01 NOTE — PATIENT INSTRUCTIONS
Personalized Preventive Plan for Denzel Linda - 4/1/2019  Medicare offers a range of preventive health benefits. Some of the tests and screenings are paid in full while other may be subject to a deductible, co-insurance, and/or copay. Some of these benefits include a comprehensive review of your medical history including lifestyle, illnesses that may run in your family, and various assessments and screenings as appropriate. After reviewing your medical record and screening and assessments performed today your provider may have ordered immunizations, labs, imaging, and/or referrals for you. A list of these orders (if applicable) as well as your Preventive Care list are included within your After Visit Summary for your review. Other Preventive Recommendations:    · A preventive eye exam performed by an eye specialist is recommended every 1-2 years to screen for glaucoma; cataracts, macular degeneration, and other eye disorders. · A preventive dental visit is recommended every 6 months. · Try to get at least 150 minutes of exercise per week or 10,000 steps per day on a pedometer . · Order or download the FREE \"Exercise & Physical Activity: Your Everyday Guide\" from [employer]. Call 7-221.960.2648 or search [employer] online. · You need 3080-6515 mg of calcium and 5002-8402 IU of vitamin D per day. It is possible to meet your calcium requirement with diet alone, but a vitamin D supplement is usually necessary to meet this goal.  · When exposed to the sun, use a sunscreen that protects against both UVA and UVB radiation with an SPF of 30 or greater. Reapply every 2 to 3 hours or after sweating, drying off with a towel, or swimming. · Always wear a seat belt when traveling in a car. Always wear a helmet when riding a bicycle or motorcycle. Heart-Healthy Diet   Sodium, Fat, and Cholesterol Controlled Diet       What Is a Heart Healthy Diet?    A heart-healthy diet is one that limits sodium , certain types of fat , and cholesterol . This type of diet is recommended for:   People with any form of cardiovascular disease (eg, coronary heart disease , peripheral vascular disease , previous heart attack , previous stroke )   People with risk factors for cardiovascular disease, such as high blood pressure , high cholesterol , or diabetes   Anyone who wants to lower their risk of developing cardiovascular disease   Sodium    Sodium is a mineral found in many foods. In general, most people consume much more sodium than they need. Diets high in sodium can increase blood pressure and lead to edema (water retention). On a heart-healthy diet, you should consume no more than 2,300 mg (milligrams) of sodium per dayabout the amount in one teaspoon of table salt. The foods highest in sodium include table salt (about 50% sodium), processed foods, convenience foods, and preserved foods. Cholesterol    Cholesterol is a fat-like, waxy substance in your blood. Our bodies make some cholesterol. It is also found in animal products, with the highest amounts in fatty meat, egg yolks, whole milk, cheese, shellfish, and organ meats. On a heart-healthy diet, you should limit your cholesterol intake to less than 200 mg per day. It is normal and important to have some cholesterol in your bloodstream. But too much cholesterol can cause plaque to build up within your arteries, which can eventually lead to a heart attack or stroke. The two types of cholesterol that are most commonly referred to are:   Low-density lipoprotein (LDL) cholesterol  Also known as bad cholesterol, this is the cholesterol that tends to build up along your arteries. Bad cholesterol levels are increased by eating fats that are saturated or hydrogenated. Optimal level of this cholesterol is less than 100. Over 130 starts to get risky for heart disease.    High-density lipoprotein (HDL) cholesterol  Also known as good cholesterol, this type of cholesterol actually carries cholesterol away from your arteries and may, therefore, help lower your risk of having a heart attack. You want this level to be high (ideally greater than 60). It is a risk to have a level less than 40. You can raise this good cholesterol by eating olive oil, canola oil, avocados, or nuts. Exercise raises this level, too. Fat    Fat is calorie dense and packs a lot of calories into a small amount of food. Even though fats should be limited due to their high calorie content, not all fats are bad. In fact, some fats are quite healthful. Fat can be broken down into four main types. The good-for-you fats are:   Monounsaturated fat  found in oils such as olive and canola, avocados, and nuts and natural nut butters; can decrease cholesterol levels, while keeping levels of HDL cholesterol high   Polyunsaturated fat  found in oils such as safflower, sunflower, soybean, corn, and sesame; can decrease total cholesterol and LDL cholesterol   Omega-3 fatty acids  particularly those found in fatty fish (such as salmon, trout, tuna, mackerel, herring, and sardines); can decrease risk of arrhythmias, decrease triglyceride levels, and slightly lower blood pressure   The fats that you want to limit are:   Saturated fat  found in animal products, many fast foods, and a few vegetables; increases total blood cholesterol, including LDL levels   Animal fats that are saturated include: butter, lard, whole-milk dairy products, meat fat, and poultry skin   Vegetable fats that are saturated include: hydrogenated shortening, palm oil, coconut oil, cocoa butter   Hydrogenated or trans fat  found in margarine and vegetable shortening, most shelf stable snack foods, and fried foods; increases LDL and decreases HDL     It is generally recommended that you limit your total fat for the day to less than 30% of your total calories.  If you follow an 1800-calorie heart healthy diet, for week) Tofu Nuts or seeds (unsalted, dry-roasted), low-sodium peanut butter Dried peas, beans, and lentils   Any smoked, cured, salted, or canned meat, fish, or poultry (including dominguez, chipped beef, cold cuts, hot dogs, sausages, sardines, and anchovies) Poultry skins Breaded and/or fried fish or meats Canned peas, beans, and lentils Salted nuts   Fats and Oils   Olive oil and canola oil Low-sodium, low-fat salad dressings and mayonnaise   Butter, margarine, coconut and palm oils, dominguez fat   Snacks, Sweets, and Condiments   Low-sodium or unsalted versions of broths, soups, soy sauce, and condiments Pepper, herbs, and spices; vinegar, lemon, or lime juice Low-fat frozen desserts (yogurt, sherbet, fruit bars) Sugar, cocoa powder, honey, syrup, jam, and preserves Low-fat, trans-fat free cookies, cakes, and pies Wes and animal crackers, fig bars, monica snaps   High-fat desserts Broth, soups, gravies, and sauces, made from instant mixes or other high-sodium ingredients Salted snack foods Canned olives Meat tenderizers, seasoning salt, and most flavored vinegars   Beverages   Low-sodium carbonated beverages Tea and coffee in moderation Soy milk   Commercially softened water   Suggestions   Make whole grains, fruits, and vegetables the base of your diet. Choose heart-healthy fats such as canola, olive, and flaxseed oil, and foods high in heart-healthy fats, such as nuts, seeds, soybeans, tofu, and fish. Eat fish at least twice per week; the fish highest in omega-3 fatty acids and lowest in mercury include salmon, herring, mackerel, sardines, and canned chunk light tuna. If you eat fish less than twice per week or have high triglycerides, talk to your doctor about taking fish oil supplements. Read food labels.    For products low in fat and cholesterol, look for fat free, low-fat, cholesterol free, saturated fat free, and trans fat freeAlso scan the Nutrition Facts Label, which lists saturated fat, trans fat, alcohol. Drink no more than 1 alcohol drink a day if you are a woman. Drink no more than 2 alcohol drinks a day if you are a man. Do not smoke. Smoking can make bones thin faster. If you need help quitting, talk to your doctor about stop-smoking programs and medicines. These can increase your chances of quitting for good. When should you call for help? Watch closely for changes in your health, and be sure to contact your doctor if:  You need help with a healthy eating plan. You need help with an exercise plan    © 9800-0704 [employer]. Care instructions adapted under license by [facility]. This care instruction is for use with your licensed healthcare professional. If you have questions about a medical condition or this instruction, always ask your healthcare professional. [address] any warranty or liability for your use of this information. Content Version: 9.4.25665; Last Revised: June 20, 2011              ·     Keep Your Memory Joseph Setting       Many factors can affect your ability to remembera hectic lifestyle, aging, stress, chronic disease, and certain medicines. But, there are steps you can take to sharpen your mind and help preserve your memory. Challenge Your Brain   Regularly challenging your mind may help keeps it in top shape. Good mental exercises include:   Crossword puzzlesUse a dictionary if you need it; you will learn more that way. Brainteasers Try some! Crafts, such as wood working and sewing   Hobbies, such as gardening and building model airplanes   SocializingVisit old friends or join groups to meet new ones.    Reading   Learning a new language   Taking a class, whether it be art history or blaise chi   TravelingExperience the food, history, and culture of your destination   Learning to use a computer   Going to museums, the theater, or thought-provoking movies   Changing things in your daily life, such as reversing your pattern in the grocery store or brushing your teeth using your nondominant hand   Use Memory Aids   There is no need to remember every detail on your own. These memory aids can help:   Calendars and day planners   Electronic organizers to store all sorts of helpful informationThese devices can \"beep\" to remind you of appointments. A book of days to record birthdays, anniversaries, and other occasions that occur on the same date every year   Detailed \"to-do\" lists and strategically placed sticky notes   Quick \"study\" sessionsBefore a gathering, review who will be there so their names will be fresh in your mind. Establish routinesFor example, keep your keys, wallet, and umbrella in the same place all the time or take medicine with your 8:00 AM glass of juice   Live a Healthy Life   Many actions that will keep your body strong will do the same for your mind. For example:   Talk to Your Doctor About Herbs and Supplements    Malnutrition and vitamin deficiencies can impair your mental function. For example, vitamin B12 deficiency can cause a range of symptoms, including confusion. But, what if your nutritional needs are being met? Can herbs and supplements still offer a benefit? Researchers have investigated a range of natural remedies, such as ginkgo , ginseng , and the supplement phosphatidylserine (PS). So far, though, the evidence is inconsistent as to whether these products can improve memory or thinking. If you are interested in taking herbs and supplements, talk to your doctor first because they may interact with other medicines that you are taking. Exercise Regularly    Among the many benefits of regular exercise are increased blood flow to the brain and decreased risk of certain diseases that can interfere with memory function. One study found that even moderate exercise has a beneficial effect.  Examples of \"moderate\" exercise include:   Playing 18 holes of golf once a week, without a cart   Playing tennis twice a week Walking one mile per day   Manage Stress    It can be tough to remember what is important when your mind is cluttered. Make time for relaxation. Choose activities that calm you down, and make it routine. Manage Chronic Conditions    Side effects of high blood pressure , diabetes, and heart disease can interfere with mental function. Many of the lifestyle steps discussed here can help manage these conditions. Strive to eat a healthy diet, exercise regularly, get stress under control, and follow your doctor's advice for your condition. Minimize Medications    Talk to your doctor about the medicines that you take. Some may be unnecessary. Also, healthy lifestyle habits may lower the need for certain drugs. Last Reviewed: April 2010 Samina Dominique MD   Updated: 4/13/2010   ·     [address] [place] [address]       As we get older, changes in balance, gait, strength, vision, hearing, and cognition make even the most youthful senior more prone to accidents. Falls are one of the leading health risks for older people. This increased risk of falling is related to:   Aging process (eg, decreased muscle strength, slowed reflexes)   Higher incidence of chronic health problems (eg, arthritis, diabetes) that may limit mobility, agility or sensory awareness   Side effects of medicine (eg, dizziness, blurred vision)especially medicines like prescription pain medicines and drugs used to treat mental health conditions   Depending on the brittleness of your bones, the consequences of a fall can be serious and long lasting. Home Life   Research by the Association of [employer] [facility]) shows that some home accidents among older adults can be prevented by making simple lifestyle changes and basic modifications and repairs to the home environment. Here are some lifestyle changes that experts recommend:   Have your hearing and vision checked regularly. Be sure to wear prescription glasses that are right for you.    Speak to your doctor or pharmacist about the possible side effects of your medicines. A number of medicines can cause dizziness. If you have problems with sleep, talk to your doctor. Limit your intake of alcohol. If necessary, use a cane or walker to help maintain your balance. Wear supportive, rubber-soled shoes, even at home. If you live in a region that gets wintry weather, you may want to put special cleats on your shoes to prevent you from slipping on the snow and ice. Exercise regularly to help maintain muscle tone, agility, and balance. Always hold the banister when going up or down stairs. Also, use  bars when getting in or out of the bath or shower, or using the toilet. To avoid dizziness, get up slowly from a lying down position. Sit up first, dangling your legs for a minute or two before rising to a standing position. Overall Home Safety Check   According to the Consumer Product Safety Commision's \"Older Consumer Home Safety Checklist,\" it is important to check for potential hazards in each room. And remember, proper lighting is an essential factor in home safety. If you cannot see clearly, you are more likely to fall. Important questions to ask yourself include:   Are lamp, electric, extension, and telephone cords placed out of the flow of traffic and maintained in good condition? Have frayed cords been replaced? Are all small rugs and runners slip resistant? If not, you can secure them to the floor with a special double-sided carpet tape. Are smoke detectors properly locatedone on every floor of your home and one outside of every sleeping area? Are they in good working order? Are batteries replaced at least once a year? Do you have a well-maintained carbon monoxide detector outside every sleeping are in your home? Does your furniture layout leave plenty of space to maneuver between and around chairs, tables, beds, and sofas? Are hallways, stairs and passages between rooms well lit?  Can you reach a lamp without getting out of bed? Are floor surfaces well maintained? Shag rugs, high-pile carpeting, tile floors, and polished wood floors can be particularly slippery. Stairs should always have handrails and be carpeted or fitted with a non-skid tread. Is your telephone easily reachable. Is the cord safely tucked away? Room by Room   According to the Association of Aging, bathrooms and [place] are the two most potentially hazardous rooms in your home. In the Kitchen    Be sure your stove is in proper working order and always make sure burners and the oven are off before you go out or go to sleep. Keep pots on the back burners, turn handles away from the front of the stove, and keep stove clean and free of grease build-up. Kitchen ventilation systems and range exhausts should be working properly. Keep flammable objects such as towels and pot holders away from the cooking area except when in use. Make sure kitchen curtains are tied back. Move cords and appliances away from the sink and hot surfaces. If extension cords are needed, install wiring guides so they do not hang over the sink, range, or working areas. Look for coffee pots, kettles and toaster ovens with automatic shut-offs. Keep a mop handy in the kitchen so you can wipe up spills instantly. You should also have a small fire extinguisher. Arrange your kitchen with frequently used items on lower shelves to avoid the need to stand on a stepstool to reach them. Make sure countertops are well-lit to avoid injuries while cutting and preparing food. In the Bathroom    Use a non-slip mat or decals in the tub and shower, since wet, soapy tile or porcelain surfaces are extremely slippery. Make sure bathroom rugs are non-skid or tape them firmly to the floor. Bathtubs should have at least one, preferably two, grab bars, firmly attached to structural supports in the wall.  (Do not use built-in soap holders or glass shower doors fireplace and other fuel-burning appliances yearly. Helping Hands   Baby boomers entering the shoemaker years will continue to see the development of new products to help older adults live safely and independently in spite of age-related changes. Making Life More Livable  , by Diego Huang, lists over 1,000 products for \"living well in the mature years,\" such as bathing and mobility aids, household security devices, ergonomically designed knives and peelers, and faucet valves and knobs for temperature control. Medical supply stores and organizations are good sources of information about products that improve your quality of life and insure your safety.      Last Reviewed: November 2009 Stewart Garcia MD   Updated: 3/7/2011     ·

## 2019-04-02 ENCOUNTER — OFFICE VISIT (OUTPATIENT)
Dept: FAMILY MEDICINE CLINIC | Age: 79
End: 2019-04-02
Payer: MEDICARE

## 2019-04-02 VITALS
WEIGHT: 140 LBS | BODY MASS INDEX: 23.9 KG/M2 | HEIGHT: 64 IN | TEMPERATURE: 97.8 F | DIASTOLIC BLOOD PRESSURE: 70 MMHG | HEART RATE: 71 BPM | SYSTOLIC BLOOD PRESSURE: 136 MMHG | OXYGEN SATURATION: 97 %

## 2019-04-02 DIAGNOSIS — N18.30 CHRONIC KIDNEY DISEASE, STAGE III (MODERATE) (HCC): ICD-10-CM

## 2019-04-02 DIAGNOSIS — M25.472 ANKLE EDEMA, BILATERAL: ICD-10-CM

## 2019-04-02 DIAGNOSIS — I10 ESSENTIAL HYPERTENSION: Primary | ICD-10-CM

## 2019-04-02 DIAGNOSIS — E11.65 UNCONTROLLED TYPE 2 DIABETES MELLITUS WITH HYPERGLYCEMIA (HCC): ICD-10-CM

## 2019-04-02 DIAGNOSIS — M25.471 ANKLE EDEMA, BILATERAL: ICD-10-CM

## 2019-04-02 LAB — HBA1C MFR BLD: 8.7 %

## 2019-04-02 PROCEDURE — 83036 HEMOGLOBIN GLYCOSYLATED A1C: CPT | Performed by: FAMILY MEDICINE

## 2019-04-02 PROCEDURE — G8427 DOCREV CUR MEDS BY ELIG CLIN: HCPCS | Performed by: FAMILY MEDICINE

## 2019-04-02 PROCEDURE — G8420 CALC BMI NORM PARAMETERS: HCPCS | Performed by: FAMILY MEDICINE

## 2019-04-02 PROCEDURE — 1123F ACP DISCUSS/DSCN MKR DOCD: CPT | Performed by: FAMILY MEDICINE

## 2019-04-02 PROCEDURE — 4040F PNEUMOC VAC/ADMIN/RCVD: CPT | Performed by: FAMILY MEDICINE

## 2019-04-02 PROCEDURE — G8399 PT W/DXA RESULTS DOCUMENT: HCPCS | Performed by: FAMILY MEDICINE

## 2019-04-02 PROCEDURE — 99214 OFFICE O/P EST MOD 30 MIN: CPT | Performed by: FAMILY MEDICINE

## 2019-04-02 PROCEDURE — 1090F PRES/ABSN URINE INCON ASSESS: CPT | Performed by: FAMILY MEDICINE

## 2019-04-02 PROCEDURE — 1036F TOBACCO NON-USER: CPT | Performed by: FAMILY MEDICINE

## 2019-04-02 RX ORDER — TRIAMTERENE AND HYDROCHLOROTHIAZIDE 37.5; 25 MG/1; MG/1
1 TABLET ORAL DAILY
Qty: 90 TABLET | Refills: 1
Start: 2019-04-02 | End: 2019-04-04 | Stop reason: SDUPTHER

## 2019-04-02 NOTE — PATIENT INSTRUCTIONS
Patient Education        Learning About Diabetes Food Guidelines  Your Care Instructions    Meal planning is important to manage diabetes. It helps keep your blood sugar at a target level (which you set with your doctor). You don't have to eat special foods. You can eat what your family eats, including sweets once in a while. But you do have to pay attention to how often you eat and how much you eat of certain foods. You may want to work with a dietitian or a certified diabetes educator (CDE) to help you plan meals and snacks. A dietitian or CDE can also help you lose weight if that is one of your goals. What should you know about eating carbs? Managing the amount of carbohydrate (carbs) you eat is an important part of healthy meals when you have diabetes. Carbohydrate is found in many foods. · Learn which foods have carbs. And learn the amounts of carbs in different foods. ? Bread, cereal, pasta, and rice have about 15 grams of carbs in a serving. A serving is 1 slice of bread (1 ounce), ½ cup of cooked cereal, or 1/3 cup of cooked pasta or rice. ? Fruits have 15 grams of carbs in a serving. A serving is 1 small fresh fruit, such as an apple or orange; ½ of a banana; ½ cup of cooked or canned fruit; ½ cup of fruit juice; 1 cup of melon or raspberries; or 2 tablespoons of dried fruit. ? Milk and no-sugar-added yogurt have 15 grams of carbs in a serving. A serving is 1 cup of milk or 2/3 cup of no-sugar-added yogurt. ? Starchy vegetables have 15 grams of carbs in a serving. A serving is ½ cup of mashed potatoes or sweet potato; 1 cup winter squash; ½ of a small baked potato; ½ cup of cooked beans; or ½ cup cooked corn or green peas. · Learn how much carbs to eat each day and at each meal. A dietitian or CDE can teach you how to keep track of the amount of carbs you eat. This is called carbohydrate counting. · If you are not sure how to count carbohydrate grams, use the Plate Method to plan meals.  It is a good, quick way to make sure that you have a balanced meal. It also helps you spread carbs throughout the day. ? Divide your plate by types of foods. Put non-starchy vegetables on half the plate, meat or other protein food on one-quarter of the plate, and a grain or starchy vegetable in the final quarter of the plate. To this you can add a small piece of fruit and 1 cup of milk or yogurt, depending on how many carbs you are supposed to eat at a meal.  · Try to eat about the same amount of carbs at each meal. Do not \"save up\" your daily allowance of carbs to eat at one meal.  · Proteins have very little or no carbs per serving. Examples of proteins are beef, chicken, turkey, fish, eggs, tofu, cheese, cottage cheese, and peanut butter. A serving size of meat is 3 ounces, which is about the size of a deck of cards. Examples of meat substitute serving sizes (equal to 1 ounce of meat) are 1/4 cup of cottage cheese, 1 egg, 1 tablespoon of peanut butter, and ½ cup of tofu. How can you eat out and still eat healthy? · Learn to estimate the serving sizes of foods that have carbohydrate. If you measure food at home, it will be easier to estimate the amount in a serving of restaurant food. · If the meal you order has too much carbohydrate (such as potatoes, corn, or baked beans), ask to have a low-carbohydrate food instead. Ask for a salad or green vegetables. · If you use insulin, check your blood sugar before and after eating out to help you plan how much to eat in the future. · If you eat more carbohydrate at a meal than you had planned, take a walk or do other exercise. This will help lower your blood sugar. What else should you know? · Limit saturated fat, such as the fat from meat and dairy products. This is a healthy choice because people who have diabetes are at higher risk of heart disease. So choose lean cuts of meat and nonfat or low-fat dairy products.  Use olive or canola oil instead of butter or shortening when cooking. · Don't skip meals. Your blood sugar may drop too low if you skip meals and take insulin or certain medicines for diabetes. · Check with your doctor before you drink alcohol. Alcohol can cause your blood sugar to drop too low. Alcohol can also cause a bad reaction if you take certain diabetes medicines. Follow-up care is a key part of your treatment and safety. Be sure to make and go to all appointments, and call your doctor if you are having problems. It's also a good idea to know your test results and keep a list of the medicines you take. Where can you learn more? Go to https://Sauce Labspepiceweb.IMRSV. org and sign in to your [employer] account. Enter H052 in the EDMdesigner box to learn more about \"Learning About Diabetes Food Guidelines. \"     If you do not have an account, please click on the \"Sign Up Now\" link. Current as of: July 25, 2018  Content Version: 11.9  © 4966-4263 "[employer] Care instructions adapted under license by [facility] ([facility]). If you have questions about a medical condition or this instruction, always ask your healthcare professional. [place] [address] any warranty or liability for your use of this information. Patient Education        Learning About Meal Planning for Diabetes  Why plan your meals? Meal planning can be a key part of managing diabetes. Planning meals and snacks with the right balance of carbohydrate, protein, and fat can help you keep your blood sugar at the target level you set with your doctor. You don't have to eat special foods. You can eat what your family eats, including sweets once in a while. But you do have to pay attention to how often you eat and how much you eat of certain foods. You may want to work with a dietitian or a certified diabetes educator. He or she can give you tips and meal ideas and can answer your questions about meal planning.  This health professional can also help you reach a sugar levels. A registered dietitian or diabetes educator can help you plan how much carbohydrate to include in each meal and snack. A guideline for your daily amount of carbohydrate is:  · 45 to 60 grams at each meal. That's about the same as 3 to 4 carbohydrate servings. · 15 to 20 grams at each snack. That's about the same as 1 carbohydrate serving. The Nutrition Facts label on packaged foods tells you how much carbohydrate is in a serving of the food. First, look at the serving size on the food label. Is that the amount you eat in a serving? All of the nutrition information on a food label is based on that serving size. So if you eat more or less than that, you'll need to adjust the other numbers. Total carbohydrate is the next thing you need to look for on the label. If you count carbohydrate servings, one serving of carbohydrate is 15 grams. For foods that don't come with labels, such as fresh fruits and vegetables, you'll need a guide that lists carbohydrate in these foods. Ask your doctor, dietitian, or diabetes educator about books or other nutrition guides you can use. If you take insulin, you need to know how many grams of carbohydrate are in a meal. This lets you know how much rapid-acting insulin to take before you eat. If you use an insulin pump, you get a constant rate of insulin during the day. So the pump must be programmed at meals to give you extra insulin to cover the rise in blood sugar after meals. When you know how much carbohydrate you will eat, you can take the right amount of insulin. Or, if you always use the same amount of insulin, you need to make sure that you eat the same amount of carbohydrate at meals. If you need more help to understand carbohydrate counting and food labels, ask your doctor, dietitian, or diabetes educator. How do you get started with meal planning? Here are some tips to get started:  · Plan your meals a week at a time.  Don't forget to include snacks too.  · Use cookbooks or online recipes to plan several main meals. Plan some quick meals for busy nights. You also can double some recipes that freeze well. Then you can save half for other busy nights when you don't have time to cook. · Make sure you have the ingredients you need for your recipes. If you're running low on basic items, put these items on your shopping list too. · List foods that you use to make breakfasts, lunches, and snacks. List plenty of fruits and vegetables. · Post this list on the refrigerator. Add to it as you think of more things you need. · Take the list to the store to do your weekly shopping. Follow-up care is a key part of your treatment and safety. Be sure to make and go to all appointments, and call your doctor if you are having problems. It's also a good idea to know your test results and keep a list of the medicines you take. Where can you learn more? Go to https://VobilepemySBX.[employer]. org and sign in to your [employer] account. Enter Y427 in the [employer] box to learn more about \"Learning About Meal Planning for Diabetes. \"     If you do not have an account, please click on the \"Sign Up Now\" link. Current as of: July 25, 2018  Content Version: 11.9  © 1246-8595 [employer]. Care instructions adapted under license by [facility] ([facility]). If you have questions about a medical condition or this instruction, always ask your healthcare professional. [place] [address] any warranty or liability for your use of this information.

## 2019-04-04 ENCOUNTER — HOSPITAL ENCOUNTER (OUTPATIENT)
Dept: WOMENS IMAGING | Age: 79
Discharge: HOME | End: 2019-04-04
Payer: MEDICARE

## 2019-04-04 DIAGNOSIS — Z13.820 ENCOUNTER FOR IMAGING TO ASSESS OSTEOPOROSIS: ICD-10-CM

## 2019-04-04 PROCEDURE — 77080 DXA BONE DENSITY AXIAL: CPT

## 2019-04-04 NOTE — TELEPHONE ENCOUNTER
Patient called stating Cece, [facility] still has not received her script for the Maxzide 37.5-25. Wants to know if it can be re-sent?

## 2019-04-04 NOTE — TELEPHONE ENCOUNTER
That's because it went to [facility]. Does she want it sent to [facility]'s?   If so, please pend the correct pharmacy

## 2019-04-04 NOTE — TELEPHONE ENCOUNTER
Called patient to confirm if she wants it resent [facility] or if she will p/u at [facility]

## 2019-04-05 RX ORDER — TRIAMTERENE AND HYDROCHLOROTHIAZIDE 37.5; 25 MG/1; MG/1
1 TABLET ORAL DAILY
Qty: 90 TABLET | Refills: 1 | Status: SHIPPED | OUTPATIENT
Start: 2019-04-05 | End: 2019-09-20 | Stop reason: SDUPTHER

## 2019-04-08 ASSESSMENT — ENCOUNTER SYMPTOMS
BLURRED VISION: 0
SHORTNESS OF BREATH: 0

## 2019-04-08 NOTE — PROGRESS NOTES
2019     Kylie Kwan (:  1940) is a 70 y.o. female, here for evaluation of the following medical concerns:    Chief complaint: Patient presents with:  Shaking: x2 days last week but has resolved   Weight Gain  Hypertension     Past medical, surgical, family and social history, as well as current medications and allergies, were reviewed and updated with the patient. Hypertension   This is a chronic problem. The current episode started more than 1 year ago. The problem is unchanged. The problem is uncontrolled. Associated symptoms include peripheral edema. Pertinent negatives include no blurred vision, chest pain, headaches, palpitations or shortness of breath. Risk factors for coronary artery disease include post-menopausal state and sedentary lifestyle. Past treatments include beta blockers, angiotensin blockers and diuretics. The current treatment provides no improvement. Compliance problems include exercise and diet. Diabetes Mellitus Type 2: Current symptoms/problems include none. Medication compliance:  not applicable  Diabetic diet compliance:  noncompliant: didn't know she was supposed to change diet, even though it was discussed at last visit,  Weight trend: increasing  Current exercise: no regular exercise  Barriers: lack of education    Home blood sugar records: patient does not test  Any episodes of hypoglycemia? no  Eye exam current (within one year): no   reports that she has never smoked. She has never used smokeless tobacco.   Daily Aspirin?  Yes  Known diabetic complications: nephropathy    Lab Results   Component Value Date    LABA1C 8.7 2019    LABA1C 7.6 2019     Lab Results   Component Value Date    CREATININE 1.4 (H) 2019     Lab Results   Component Value Date    ALT 23 2019    AST 26 2019     No components found for: CHLPL  Lab Results   Component Value Date    TRIG 341 (H) 2019     Lab Results   Component Value Date    HDL 31 (L) 01/29/2019     Lab Results   Component Value Date    LDLCALC see below 01/29/2019    LDLDIRECT 107 01/29/2019           Review of Systems   Constitutional: Positive for unexpected weight change (gaining). Eyes: Negative for blurred vision. Respiratory: Negative for shortness of breath. Cardiovascular: Positive for leg swelling. Negative for chest pain and palpitations. Neurological: Negative for headaches. Prior to Visit Medications    Medication Sig Taking? Authorizing Provider   losartan (COZAAR) 50 MG tablet Take 1 tablet by mouth daily Yes Darrel Martinez MD   metoprolol succinate (TOPROL XL) 100 MG extended release tablet Take 1 tablet by mouth nightly Yes Darrel Martinez MD   atorvastatin (LIPITOR) 40 MG tablet Take 1 tablet by mouth daily Yes Darrel Martinez MD   Probiotic Product (PROBIOTIC DAILY PO) Take by mouth Yes Historical Provider, MD   montelukast (SINGULAIR) 10 MG tablet Take 10 mg by mouth nightly Yes Historical Provider, MD   citalopram (CELEXA) 10 MG tablet Take 10 mg by mouth daily Yes Historical Provider, MD   fenofibrate 160 MG tablet Take 160 mg by mouth daily. Yes Historical Provider, MD   aspirin 81 MG EC tablet Take 81 mg by mouth every evening. Yes Historical Provider, MD   triamterene-hydrochlorothiazide (MAXZIDE-25) 37.5-25 MG per tablet Take 1 tablet by mouth daily  Darrel Martinez MD        Social History     Tobacco Use    Smoking status: Never Smoker    Smokeless tobacco: Never Used   Substance Use Topics    Alcohol use: Yes     Comment: occassionally        Vitals:    04/02/19 0931   BP: 136/70   Site: Right Upper Arm   Position: Sitting   Cuff Size: Small Adult   Pulse: 71   Temp: 97.8 °F (36.6 °C)   TempSrc: Oral   SpO2: 97%   Weight: 140 lb (63.5 kg)   Height: 5' 4\" (1.626 m)     Estimated body mass index is 24.03 kg/m² as calculated from the following:    Height as of this encounter: 5' 4\" (1.626 m).     Weight as of this encounter: 140 lb (63.5 kg).    Physical Exam   Constitutional: She appears well-developed and well-nourished. No distress. Pulmonary/Chest: Effort normal.   Musculoskeletal: She exhibits edema (trace ankle edema bilaterally). Neurological: She is alert. Skin: She is not diaphoretic. Nursing note and vitals reviewed. Results for POC orders placed in visit on 04/02/19   POCT glycosylated hemoglobin (Hb A1C)   Result Value Ref Range    Hemoglobin A1C 8.7 %         ASSESSMENT/PLAN:  1. Essential hypertension  Patient insistent on going back on old meds (Maxzide). Will cautiously restart. D/c torsemide. Explained risk to kidney with diuretics. She understands and still wishes to restart Maxzide. Explained need to continue ARB. Recheck BMP in 2 weeks. 2. Ankle edema, bilateral  Patient insistent on going back on old meds (Maxzide). Will cautiously restart. Recheck BMP in 2 weeks. 3. Chronic kidney disease, stage III (moderate) (HCC)  Patient insistent on going back on old meds (Maxzide). Will cautiously restart. Recheck BMP in 2 weeks. - BASIC METABOLIC PANEL; Future    4. Uncontrolled type 2 diabetes mellitus with hyperglycemia ([address]  Discussed diagnosis in detail as patient did not remember being diagnosed previously. Discussed diet changes and exercise as a means to improve blood glucose. - BASIC METABOLIC PANEL; Future  - POCT glycosylated hemoglobin (Hb A1C)        Return in about 2 weeks (around 4/16/2019) for lab only for BMP recheck, to see me in 3 months for diabetes. An electronic signature was used to authenticate this note.     --Alesha Leija MD on 4/8/2019 at 5:56 AM

## 2019-04-16 ENCOUNTER — NURSE ONLY (OUTPATIENT)
Dept: FAMILY MEDICINE CLINIC | Age: 79
End: 2019-04-16

## 2019-04-16 VITALS — DIASTOLIC BLOOD PRESSURE: 80 MMHG | SYSTOLIC BLOOD PRESSURE: 122 MMHG

## 2019-04-23 ENCOUNTER — TELEPHONE (OUTPATIENT)
Dept: FAMILY MEDICINE CLINIC | Age: 79
End: 2019-04-23

## 2019-04-23 NOTE — TELEPHONE ENCOUNTER
Losartan is currently on call back (torent)    Pt  states she is not going to take medication tonight ?     Pharmacy on file   Please call advise

## 2019-05-13 DIAGNOSIS — I10 ESSENTIAL HYPERTENSION: ICD-10-CM

## 2019-05-13 DIAGNOSIS — E11.65 UNCONTROLLED TYPE 2 DIABETES MELLITUS WITH HYPERGLYCEMIA (HCC): ICD-10-CM

## 2019-05-13 NOTE — TELEPHONE ENCOUNTER
.  Last office visit 4/2/2019     Last written 2/5/19 30 TABLET 2 REFILLS     Next office visit scheduled 7/5/2019    Requested Prescriptions     Pending Prescriptions Disp Refills    atorvastatin (LIPITOR) 40 MG tablet [Pharmacy Med Name: ATORVASTATIN 40 MG TABLET] 30 tablet 1     Sig: TAKE ONE TABLET BY MOUTH DAILY

## 2019-05-13 NOTE — TELEPHONE ENCOUNTER
.  Last office visit 4/2/2019     Last written 3-16-19 30 with 0      Next office visit scheduled 7/5/2019    Requested Prescriptions     Pending Prescriptions Disp Refills    losartan (COZAAR) 50 MG tablet [Pharmacy Med Name: LOSARTAN POTASSIUM 50 MG TAB] 60 tablet 0     Sig: TAKE ONE TABLET BY MOUTH TWICE A DAY

## 2019-05-14 RX ORDER — ATORVASTATIN CALCIUM 40 MG/1
TABLET, FILM COATED ORAL
Qty: 30 TABLET | Refills: 2 | Status: SHIPPED | OUTPATIENT
Start: 2019-05-14 | End: 2019-08-14 | Stop reason: SDUPTHER

## 2019-05-14 RX ORDER — LOSARTAN POTASSIUM 50 MG/1
TABLET ORAL
Qty: 60 TABLET | Refills: 2 | Status: SHIPPED | OUTPATIENT
Start: 2019-05-14 | End: 2019-07-11

## 2019-07-01 RX ORDER — FENOFIBRATE 160 MG/1
160 TABLET ORAL DAILY
Qty: 30 TABLET | Refills: 2 | Status: SHIPPED | OUTPATIENT
Start: 2019-07-01 | End: 2019-10-18 | Stop reason: SDUPTHER

## 2019-07-01 NOTE — TELEPHONE ENCOUNTER
Last seen: 4-2-2019  Future appt: 7-  Last written by previous physician but I don't see where you have written.

## 2019-07-11 ENCOUNTER — OFFICE VISIT (OUTPATIENT)
Dept: FAMILY MEDICINE CLINIC | Age: 79
End: 2019-07-11
Payer: MEDICARE

## 2019-07-11 VITALS
HEART RATE: 66 BPM | BODY MASS INDEX: 23.46 KG/M2 | DIASTOLIC BLOOD PRESSURE: 78 MMHG | SYSTOLIC BLOOD PRESSURE: 126 MMHG | HEIGHT: 64 IN | OXYGEN SATURATION: 97 % | WEIGHT: 137.4 LBS

## 2019-07-11 DIAGNOSIS — I10 ESSENTIAL HYPERTENSION: ICD-10-CM

## 2019-07-11 DIAGNOSIS — R53.83 FATIGUE, UNSPECIFIED TYPE: ICD-10-CM

## 2019-07-11 DIAGNOSIS — E11.65 UNCONTROLLED TYPE 2 DIABETES MELLITUS WITH HYPERGLYCEMIA (HCC): Primary | ICD-10-CM

## 2019-07-11 DIAGNOSIS — R23.2 HOT FLASHES: ICD-10-CM

## 2019-07-11 LAB — HBA1C MFR BLD: 7.8 %

## 2019-07-11 PROCEDURE — 1123F ACP DISCUSS/DSCN MKR DOCD: CPT | Performed by: FAMILY MEDICINE

## 2019-07-11 PROCEDURE — 4040F PNEUMOC VAC/ADMIN/RCVD: CPT | Performed by: FAMILY MEDICINE

## 2019-07-11 PROCEDURE — 83036 HEMOGLOBIN GLYCOSYLATED A1C: CPT | Performed by: FAMILY MEDICINE

## 2019-07-11 PROCEDURE — G8427 DOCREV CUR MEDS BY ELIG CLIN: HCPCS | Performed by: FAMILY MEDICINE

## 2019-07-11 PROCEDURE — G8399 PT W/DXA RESULTS DOCUMENT: HCPCS | Performed by: FAMILY MEDICINE

## 2019-07-11 PROCEDURE — 1090F PRES/ABSN URINE INCON ASSESS: CPT | Performed by: FAMILY MEDICINE

## 2019-07-11 PROCEDURE — 1036F TOBACCO NON-USER: CPT | Performed by: FAMILY MEDICINE

## 2019-07-11 PROCEDURE — G8420 CALC BMI NORM PARAMETERS: HCPCS | Performed by: FAMILY MEDICINE

## 2019-07-11 PROCEDURE — 99214 OFFICE O/P EST MOD 30 MIN: CPT | Performed by: FAMILY MEDICINE

## 2019-07-11 RX ORDER — LOSARTAN POTASSIUM 50 MG/1
50 TABLET ORAL DAILY
Qty: 30 TABLET | Refills: 2 | Status: SHIPPED | OUTPATIENT
Start: 2019-07-11 | End: 2020-02-19

## 2019-07-12 ASSESSMENT — ENCOUNTER SYMPTOMS
SHORTNESS OF BREATH: 0
ABDOMINAL PAIN: 0

## 2019-07-12 NOTE — PROGRESS NOTES
2019     Vidal Simmons (:  1940) is a 70 y.o. female, here for evaluation of the following medical concerns:    Chief complaint: Patient presents with:  Diabetes  Hypertension  Sweats  Fatigue     Past medical, surgical, family and social history, as well as current medications and allergies, were reviewed and updated with the patient. Diabetes Mellitus Type 2: Current symptoms/problems include fatigue and sweats. Medication compliance:  not applicable  Diabetic diet compliance:  compliant most of the time,  Weight trend: decreasing  Current exercise: no regular exercise  Barriers: impairment:  physical: hot flashes make it hard to get exercise due to heat intolerance. Has been having hot flashes since hysterectomy in her late 44's. Home blood sugar records: patient does not test  Any episodes of hypoglycemia? no  Eye exam current (within one year): yes   reports that she has never smoked. She has never used smokeless tobacco.   Daily Aspirin? Yes    Lab Results   Component Value Date    LABA1C 7.8 2019    LABA1C 8.7 2019    LABA1C 7.6 2019     Lab Results   Component Value Date    CREATININE 1.4 (H) 2019     Lab Results   Component Value Date    ALT 23 2019    AST 26 2019     Lab Results   Component Value Date    CHOL 169 2019    TRIG 341 (H) 2019    HDL 31 (L) 2019    LDLCALC see below 2019    LDLDIRECT 107 (H) 2019        Hypertension:  Home blood pressure monitoring: Yes - 130/70 most of the time. She is adherent to a low sodium diet. Patient denies chest pain, shortness of breath, headache, lightheadedness, blurred vision and peripheral edema. Antihypertensive medication side effects: no medication side effects noted. Use of agents associated with hypertension: none.                                         Sodium (mmol/L)   Date Value   2019 144    BUN (mg/dL)   Date Value   2019 25 (H)    Glucose

## 2019-08-20 ENCOUNTER — OFFICE VISIT (OUTPATIENT)
Dept: FAMILY MEDICINE CLINIC | Age: 79
End: 2019-08-20
Payer: MEDICARE

## 2019-08-20 VITALS
BODY MASS INDEX: 23.05 KG/M2 | WEIGHT: 135 LBS | OXYGEN SATURATION: 98 % | HEIGHT: 64 IN | DIASTOLIC BLOOD PRESSURE: 70 MMHG | SYSTOLIC BLOOD PRESSURE: 110 MMHG | HEART RATE: 59 BPM

## 2019-08-20 DIAGNOSIS — S91.202A OPEN WOUND OF LEFT GREAT TOE WITH DAMAGE TO NAIL, INITIAL ENCOUNTER: Primary | ICD-10-CM

## 2019-08-20 PROCEDURE — G8427 DOCREV CUR MEDS BY ELIG CLIN: HCPCS | Performed by: FAMILY MEDICINE

## 2019-08-20 PROCEDURE — 99213 OFFICE O/P EST LOW 20 MIN: CPT | Performed by: FAMILY MEDICINE

## 2019-08-20 PROCEDURE — 1123F ACP DISCUSS/DSCN MKR DOCD: CPT | Performed by: FAMILY MEDICINE

## 2019-08-20 PROCEDURE — 1090F PRES/ABSN URINE INCON ASSESS: CPT | Performed by: FAMILY MEDICINE

## 2019-08-20 PROCEDURE — G8420 CALC BMI NORM PARAMETERS: HCPCS | Performed by: FAMILY MEDICINE

## 2019-08-20 PROCEDURE — G8399 PT W/DXA RESULTS DOCUMENT: HCPCS | Performed by: FAMILY MEDICINE

## 2019-08-20 PROCEDURE — 1036F TOBACCO NON-USER: CPT | Performed by: FAMILY MEDICINE

## 2019-08-20 PROCEDURE — 4040F PNEUMOC VAC/ADMIN/RCVD: CPT | Performed by: FAMILY MEDICINE

## 2019-08-20 RX ORDER — CEPHALEXIN 500 MG/1
CAPSULE ORAL
COMMUNITY
Start: 2019-08-14 | End: 2019-10-18 | Stop reason: ALTCHOICE

## 2019-08-21 RX ORDER — MONTELUKAST SODIUM 10 MG/1
10 TABLET ORAL NIGHTLY
Qty: 90 TABLET | Refills: 3 | Status: SHIPPED | OUTPATIENT
Start: 2019-08-21 | End: 2020-05-27 | Stop reason: SDUPTHER

## 2019-08-21 NOTE — PROGRESS NOTES
2019     Claudius Hatchet (:  1940) is a 70 y.o. female, here for evaluation of the following medical concerns:    Chief complaint: Patient presents with: Toe Injury: x2 weeks ago, left foot, great toe     Past medical, surgical, family and social history, as well as current medications and allergies, were reviewed and updated with the patient. Toe Pain    The incident occurred more than 1 week ago (2 weeks ago). Incident location: in a parking lot, tripped over a cement parking spot marker. The injury mechanism was a direct blow. The pain is present in the left toes. The quality of the pain is described as aching. The pain is mild. The pain has been improving since onset. Pertinent negatives include no inability to bear weight, loss of motion, numbness or tingling. She reports no foreign bodies present. Treatments tried: antibiotics (oral Keflex), local wound care. The treatment provided significant relief. Review of Systems   Constitutional: Negative for fever. Skin: Positive for wound. Neurological: Negative for tingling and numbness. Prior to Visit Medications    Medication Sig Taking?  Authorizing Provider   montelukast (SINGULAIR) 10 MG tablet Take 1 tablet by mouth nightly Yes Jayme Ramos MD   cephALEXin (KEFLEX) 500 MG capsule  Yes Historical Provider, MD   atorvastatin (LIPITOR) 40 MG tablet TAKE ONE TABLET BY MOUTH DAILY Yes Jayme Ramos MD   SITagliptin (JANUVIA) 50 MG tablet Take 1 tablet by mouth daily Yes MARTINE Carrnaza - CNP   losartan (COZAAR) 50 MG tablet Take 1 tablet by mouth daily Yes Jayme Ramos MD   fenofibrate 160 MG tablet Take 1 tablet by mouth daily Yes Erik Meigs, APRN - CNP   triamterene-hydrochlorothiazide (MAXZIDE-25) 37.5-25 MG per tablet Take 1 tablet by mouth daily Yes Jayme Ramos MD   metoprolol succinate (TOPROL XL) 100 MG extended release tablet Take 1 tablet by mouth nightly Yes Jayme Ramos MD   Probiotic Product

## 2019-09-20 RX ORDER — TRIAMTERENE AND HYDROCHLOROTHIAZIDE 37.5; 25 MG/1; MG/1
TABLET ORAL
Qty: 90 TABLET | Refills: 1 | Status: SHIPPED | OUTPATIENT
Start: 2019-09-20 | End: 2020-04-02 | Stop reason: SDUPTHER

## 2019-10-18 ENCOUNTER — OFFICE VISIT (OUTPATIENT)
Dept: FAMILY MEDICINE CLINIC | Age: 79
End: 2019-10-18
Payer: MEDICARE

## 2019-10-18 VITALS
SYSTOLIC BLOOD PRESSURE: 130 MMHG | HEIGHT: 64 IN | HEART RATE: 65 BPM | OXYGEN SATURATION: 97 % | DIASTOLIC BLOOD PRESSURE: 64 MMHG | BODY MASS INDEX: 23.46 KG/M2 | WEIGHT: 137.4 LBS

## 2019-10-18 DIAGNOSIS — E78.2 MIXED HYPERLIPIDEMIA: ICD-10-CM

## 2019-10-18 DIAGNOSIS — E11.9 CONTROLLED TYPE 2 DIABETES MELLITUS WITHOUT COMPLICATION, WITHOUT LONG-TERM CURRENT USE OF INSULIN (HCC): Primary | ICD-10-CM

## 2019-10-18 DIAGNOSIS — I10 ESSENTIAL HYPERTENSION: ICD-10-CM

## 2019-10-18 LAB — HBA1C MFR BLD: 6.7 %

## 2019-10-18 PROCEDURE — G8420 CALC BMI NORM PARAMETERS: HCPCS | Performed by: FAMILY MEDICINE

## 2019-10-18 PROCEDURE — G8482 FLU IMMUNIZE ORDER/ADMIN: HCPCS | Performed by: FAMILY MEDICINE

## 2019-10-18 PROCEDURE — 1123F ACP DISCUSS/DSCN MKR DOCD: CPT | Performed by: FAMILY MEDICINE

## 2019-10-18 PROCEDURE — G8399 PT W/DXA RESULTS DOCUMENT: HCPCS | Performed by: FAMILY MEDICINE

## 2019-10-18 PROCEDURE — 83036 HEMOGLOBIN GLYCOSYLATED A1C: CPT | Performed by: FAMILY MEDICINE

## 2019-10-18 PROCEDURE — 1036F TOBACCO NON-USER: CPT | Performed by: FAMILY MEDICINE

## 2019-10-18 PROCEDURE — G8427 DOCREV CUR MEDS BY ELIG CLIN: HCPCS | Performed by: FAMILY MEDICINE

## 2019-10-18 PROCEDURE — 4040F PNEUMOC VAC/ADMIN/RCVD: CPT | Performed by: FAMILY MEDICINE

## 2019-10-18 PROCEDURE — 99214 OFFICE O/P EST MOD 30 MIN: CPT | Performed by: FAMILY MEDICINE

## 2019-10-18 PROCEDURE — 1090F PRES/ABSN URINE INCON ASSESS: CPT | Performed by: FAMILY MEDICINE

## 2019-10-18 RX ORDER — FENOFIBRATE 160 MG/1
160 TABLET ORAL DAILY
Qty: 30 TABLET | Refills: 5 | Status: SHIPPED | OUTPATIENT
Start: 2019-10-18 | End: 2020-04-23

## 2019-10-20 ASSESSMENT — ENCOUNTER SYMPTOMS
SHORTNESS OF BREATH: 0
ABDOMINAL PAIN: 0

## 2019-11-19 RX ORDER — CITALOPRAM 10 MG/1
10 TABLET ORAL DAILY
Qty: 90 TABLET | Refills: 2 | Status: SHIPPED | OUTPATIENT
Start: 2019-11-19 | End: 2020-08-21

## 2019-11-25 RX ORDER — AMITRIPTYLINE HYDROCHLORIDE 25 MG/1
25 TABLET, FILM COATED ORAL NIGHTLY PRN
Qty: 30 TABLET | Refills: 5 | Status: SHIPPED | OUTPATIENT
Start: 2019-11-25 | End: 2020-04-07 | Stop reason: SDUPTHER

## 2020-02-19 RX ORDER — LOSARTAN POTASSIUM 50 MG/1
TABLET ORAL
Qty: 90 TABLET | Refills: 1 | Status: SHIPPED | OUTPATIENT
Start: 2020-02-19 | End: 2020-08-28

## 2020-02-20 RX ORDER — LOSARTAN POTASSIUM 50 MG/1
TABLET ORAL
Qty: 90 TABLET | Refills: 1 | OUTPATIENT
Start: 2020-02-20

## 2020-03-03 RX ORDER — METOPROLOL SUCCINATE 100 MG/1
100 TABLET, EXTENDED RELEASE ORAL NIGHTLY
Qty: 90 TABLET | Refills: 1 | Status: SHIPPED | OUTPATIENT
Start: 2020-03-03 | End: 2020-03-07 | Stop reason: SDUPTHER

## 2020-03-03 NOTE — TELEPHONE ENCOUNTER
Last Seen: 10/18/2019    Last Writen: By Dr. Calvert in Nov. X 90 days. I did confirm with the pharmacy that this had been filled and I confirmed with the patient that she has been taking it on a regular basis.      Next Appointment: 4/21/2020    Requested Prescriptions     Pending Prescriptions Disp Refills    metoprolol succinate (TOPROL XL) 100 MG extended release tablet 90 tablet 1     Sig: Take 1 tablet by mouth nightly

## 2020-03-04 NOTE — TELEPHONE ENCOUNTER
Patient called to confirmed Dr. Roxanne Jain wanted her to take the metoprolol. Told her script went to [facility]'s. She said insurance coverage has changed and they have to get scripts at [facility]. Requesting script be resent to [facility].   Last Seen: 10/18/2019    Next Appointment: 4/21/2020    Requested Prescriptions     Pending Prescriptions Disp Refills    metoprolol succinate (TOPROL XL) 100 MG extended release tablet 90 tablet 1     Sig: Take 1 tablet by mouth nightly

## 2020-03-07 RX ORDER — METOPROLOL SUCCINATE 100 MG/1
100 TABLET, EXTENDED RELEASE ORAL NIGHTLY
Qty: 90 TABLET | Refills: 1 | Status: SHIPPED | OUTPATIENT
Start: 2020-03-07 | End: 2021-03-08

## 2020-04-02 ENCOUNTER — TELEPHONE (OUTPATIENT)
Dept: FAMILY MEDICINE CLINIC | Age: 80
End: 2020-04-02

## 2020-04-06 ENCOUNTER — TELEPHONE (OUTPATIENT)
Dept: FAMILY MEDICINE CLINIC | Age: 80
End: 2020-04-06

## 2020-04-06 RX ORDER — TRIAMTERENE AND HYDROCHLOROTHIAZIDE 37.5; 25 MG/1; MG/1
TABLET ORAL
Qty: 90 TABLET | Refills: 1 | Status: SHIPPED | OUTPATIENT
Start: 2020-04-06 | End: 2020-10-01

## 2020-04-07 RX ORDER — AMITRIPTYLINE HYDROCHLORIDE 25 MG/1
25 TABLET, FILM COATED ORAL NIGHTLY PRN
Qty: 90 TABLET | Refills: 1 | Status: SHIPPED | OUTPATIENT
Start: 2020-04-07 | End: 2021-03-09

## 2020-05-27 RX ORDER — MONTELUKAST SODIUM 10 MG/1
10 TABLET ORAL NIGHTLY
Qty: 90 TABLET | Refills: 3 | Status: SHIPPED | OUTPATIENT
Start: 2020-05-27 | End: 2021-05-19

## 2020-06-18 ENCOUNTER — VIRTUAL VISIT (OUTPATIENT)
Dept: FAMILY MEDICINE CLINIC | Age: 80
End: 2020-06-18
Payer: MEDICARE

## 2020-06-18 PROCEDURE — G0439 PPPS, SUBSEQ VISIT: HCPCS | Performed by: FAMILY MEDICINE

## 2020-06-18 ASSESSMENT — PATIENT HEALTH QUESTIONNAIRE - PHQ9
2. FEELING DOWN, DEPRESSED OR HOPELESS: 0
SUM OF ALL RESPONSES TO PHQ QUESTIONS 1-9: 0
SUM OF ALL RESPONSES TO PHQ QUESTIONS 1-9: 0
SUM OF ALL RESPONSES TO PHQ9 QUESTIONS 1 & 2: 0
1. LITTLE INTEREST OR PLEASURE IN DOING THINGS: 0

## 2020-06-18 ASSESSMENT — LIFESTYLE VARIABLES
HOW OFTEN DURING THE LAST YEAR HAVE YOU FAILED TO DO WHAT WAS NORMALLY EXPECTED FROM YOU BECAUSE OF DRINKING: 0
HOW OFTEN DURING THE LAST YEAR HAVE YOU BEEN UNABLE TO REMEMBER WHAT HAPPENED THE NIGHT BEFORE BECAUSE YOU HAD BEEN DRINKING: 0
HAVE YOU OR SOMEONE ELSE BEEN INJURED AS A RESULT OF YOUR DRINKING: 0
HAS A RELATIVE, FRIEND, DOCTOR, OR ANOTHER HEALTH PROFESSIONAL EXPRESSED CONCERN ABOUT YOUR DRINKING OR SUGGESTED YOU CUT DOWN: 0
HOW OFTEN DO YOU HAVE A DRINK CONTAINING ALCOHOL: 4
HOW OFTEN DURING THE LAST YEAR HAVE YOU NEEDED AN ALCOHOLIC DRINK FIRST THING IN THE MORNING TO GET YOURSELF GOING AFTER A NIGHT OF HEAVY DRINKING: 0
HOW OFTEN DO YOU HAVE SIX OR MORE DRINKS ON ONE OCCASION: 0
HOW OFTEN DURING THE LAST YEAR HAVE YOU FOUND THAT YOU WERE NOT ABLE TO STOP DRINKING ONCE YOU HAD STARTED: 0
HOW OFTEN DURING THE LAST YEAR HAVE YOU HAD A FEELING OF GUILT OR REMORSE AFTER DRINKING: 0

## 2020-06-18 NOTE — PROGRESS NOTES
Medicare Annual Wellness Visit  Name: Geetha Kelly Date: 2020   MRN: <[id number]> Sex: Female   Age: 68 y.o. Ethnicity: Non-/Non    : 1940 Race: Alexi Feldman is here for Medicare AWV and Diabetes    Screenings for behavioral, psychosocial and functional/safety risks, and cognitive dysfunction are all negative except as indicated below. These results, as well as other patient data from the [address] form, are documented in Flowsheets linked to this Encounter. Diabetes Mellitus Type 2: Current symptoms/problems include none. Medication compliance:  compliant all of the time  Diabetic diet compliance:  compliant most of the time,  Weight trend: stable    Home blood sugar records: patient does not test  Any episodes of hypoglycemia? no  Eye exam current (within one year): yes   reports that she has never smoked. She has never used smokeless tobacco.   Daily Aspirin? Yes  Known diabetic complications: nephropathy    Lab Results   Component Value Date    LABA1C 6.7 10/18/2019    LABA1C 7.8 2019    LABA1C 8.7 2019     Lab Results   Component Value Date    CREATININE 1.4 (H) 2019     Lab Results   Component Value Date    ALT 23 2019    AST 26 2019     No components found for: CHLPL  Lab Results   Component Value Date    TRIG 341 (H) 2019     Lab Results   Component Value Date    HDL 31 (L) 2019     Lab Results   Component Value Date    LDLCALC see below 2019    LDLDIRECT 107 2019           Allergies   Allergen Reactions    Adhesive Tape      Band aid caused skin tears in past     Codeine Other (See Comments)     Felt strange    Iodides      Some issues with kidneys- told not to have per PCP     Sulfa Antibiotics      Pt can't remember reaction    Penicillins Rash       Prior to Visit Medications    Medication Sig Taking?  Authorizing Provider   montelukast (SINGULAIR) 10 MG tablet Take 1 tablet by mouth nightly Yes Liana Kasper MD   fenofibrate (TRIGLIDE) 160 MG tablet TAKE ONE TABLET BY MOUTH DAILY Yes Liana Kasper MD   SITagliptin (JANUVIA) 50 MG tablet Take 1 tablet by mouth daily Yes Liana Kasper MD   triamterene-hydrochlorothiazide (MAXZIDE-25) 37.5-25 MG per tablet TAKE 1 TABLET BY MOUTH ONCE DAILY Yes Liana Kasper MD   metoprolol succinate (TOPROL XL) 100 MG extended release tablet Take 1 tablet by mouth nightly Yes Liana Kasper MD   losartan (COZAAR) 50 MG tablet TAKE ONE TABLET BY MOUTH DAILY Yes Liana Kasper MD   citalopram (CELEXA) 10 MG tablet Take 1 tablet by mouth daily Yes Liana Kasper MD   atorvastatin (LIPITOR) 40 MG tablet TAKE ONE TABLET BY MOUTH DAILY Yes Liana Kasper MD   Probiotic Product (PROBIOTIC DAILY PO) Take by mouth Yes Historical Provider, MD   aspirin 81 MG EC tablet Take 81 mg by mouth every evening.  Yes Historical Provider, MD   amitriptyline (ELAVIL) 25 MG tablet Take 1 tablet by mouth nightly as needed for Sleep  Patient not taking: Reported on 6/18/2020  Liana Kasper MD       Past Medical History:   Diagnosis Date    Anal fissure 1/7/2013    Back pain     Chronic insomnia     Depression     GERD (gastroesophageal reflux disease)     Dr. Latanya Brand (GI)    Glossitis 3/16/2017    Hearing loss     Hypercholesteremia     Hypertension     Lumbar radiculopathy 3/3/2017    Patellofemoral stress syndrome of right knee 3/3/2017       Past Surgical History:   Procedure Laterality Date    APPENDECTOMY      CHOLECYSTECTOMY      CHOLECYSTECTOMY      COLONOSCOPY  1/18/2016    Normal    HYSTERECTOMY, TOTAL ABDOMINAL      KNEE SURGERY  left knee    OVARY REMOVAL      unknown which side    UPPER GASTROINTESTINAL ENDOSCOPY  1/18/2016    Normal       Family History   Problem Relation Age of Onset    Stroke Father     Heart Disease Brother     High Blood Pressure Other        CareTeam (Including outside providers/suppliers regularly involved in providing care):   Patient Care Team:  Yuki Ontiveros MD as PCP - General (Family Medicine)  Yuki Ontiveros MD as PCP - [facility] Provider    Wt Readings from Last 3 Encounters:   10/18/19 137 lb 6.4 oz (62.3 kg)   08/20/19 135 lb (61.2 kg)   07/11/19 137 lb 6.4 oz (62.3 kg)     There were no vitals filed for this visit. There is no height or weight on file to calculate BMI. Based upon direct observation of the patient, evaluation of cognition reveals recent and remote memory intact. Patient's complete Health Risk Assessment and screening values have been reviewed and are found in Flowsheets. The following problems were reviewed today and where indicated follow up appointments were made and/or referrals ordered. Positive Risk Factor Screenings with Interventions:     Safety:  Safety  Do you have working smoke detectors?: Yes  Have all throw rugs been removed or fastened?: Yes  Do you have non-slip mats or surfaces in all bathtubs/showers?: (!) No  Do all of your stairways have a railing or banister?: Yes  Are your doorways, halls and stairs free of clutter?: Yes  Do you always fasten your seatbelt when you are in a car?: Yes  Safety Interventions:  · Home safety tips provided    ADL:  ADLs  In the past 7 days, did you need help from others to perform any of the following everyday activities? Eating, dressing, grooming, bathing, toileting, or walking/balance?: None  In the past 7 days, did you need help from others to take care of any of the following?  Laundry, housekeeping, banking/finances, shopping, telephone use, food preparation, transportation, or taking medications?: (!) Housekeeping  ADL Interventions:  · Patient declines any further evaluation/treatment for this issue     Personalized Preventive Plan   Current Health Maintenance Status  Immunization History   Administered Date(s) Administered    Influenza Vaccine, unspecified formulation 01/01/2012, 09/28/2015, 10/05/2016, 09/01/2017    Influenza risk of exposure to COVID-19 and provide necessary medical care. The patient (and/or legal guardian) has also been advised to contact this office for worsening conditions or problems, and seek emergency medical treatment and/or call 911 if deemed necessary. Patient identification was verified at the start of the visit: Yes    Services were provided through a video synchronous discussion virtually to substitute for in-person clinic visit. Patient and provider were located at their individual homes. --Chente Lay MD on 2020 at 4:10 PM    An electronic signature was used to authenticate this note. Medicare Annual Wellness Visit  Name: Aline Su Date: 2020   MRN: <[id number]> Sex: Female   Age: 68 y.o. Ethnicity: Non-/Non    : 1940 Race: Carmie Buerger is here for Medicare AWV and Diabetes    Screenings for behavioral, psychosocial and functional/safety risks, and cognitive dysfunction are all negative except as indicated below. These results, as well as other patient data from the [address] form, are documented in Flowsheets linked to this Encounter. Allergies   Allergen Reactions    Adhesive Tape      Band aid caused skin tears in past     Codeine Other (See Comments)     Felt strange    Iodides      Some issues with kidneys- told not to have per PCP     Sulfa Antibiotics      Pt can't remember reaction    Penicillins Rash       Prior to Visit Medications    Medication Sig Taking?  Authorizing Provider   montelukast (SINGULAIR) 10 MG tablet Take 1 tablet by mouth nightly Yes Chente Lay MD   fenofibrate (TRIGLIDE) 160 MG tablet TAKE ONE TABLET BY MOUTH DAILY Yes Chente Lay MD   SITagliptin (JANUVIA) 50 MG tablet Take 1 tablet by mouth daily Yes Chente Lay MD   triamterene-hydrochlorothiazide (MAXZIDE-25) 37.5-25 MG per tablet TAKE 1 TABLET BY Celina Fournier Yes Chente Lay MD   metoprolol succinate (TOPROL XL) 100 MG extended release tablet Take 1 tablet by mouth nightly Yes King Ella MD   losartan (COZAAR) 50 MG tablet TAKE ONE TABLET BY MOUTH DAILY Yes King Ella MD   citalopram (CELEXA) 10 MG tablet Take 1 tablet by mouth daily Yes King Ella MD   atorvastatin (LIPITOR) 40 MG tablet TAKE ONE TABLET BY MOUTH DAILY Yes King Ella MD   Probiotic Product (PROBIOTIC DAILY PO) Take by mouth Yes Historical Provider, MD   aspirin 81 MG EC tablet Take 81 mg by mouth every evening. Yes Historical Provider, MD   amitriptyline (ELAVIL) 25 MG tablet Take 1 tablet by mouth nightly as needed for Sleep  Patient not taking: Reported on 6/18/2020  King Ella MD       Past Medical History:   Diagnosis Date    Anal fissure 1/7/2013    Back pain     Chronic insomnia     Depression     GERD (gastroesophageal reflux disease)     Dr. Anupama Dumont (GI)    Glossitis 3/16/2017    Hearing loss     Hypercholesteremia     Hypertension     Lumbar radiculopathy 3/3/2017    Patellofemoral stress syndrome of right knee 3/3/2017       Past Surgical History:   Procedure Laterality Date    APPENDECTOMY      CHOLECYSTECTOMY      CHOLECYSTECTOMY      COLONOSCOPY  1/18/2016    Normal    HYSTERECTOMY, TOTAL ABDOMINAL      KNEE SURGERY  left knee    OVARY REMOVAL      unknown which side    UPPER GASTROINTESTINAL ENDOSCOPY  1/18/2016    Normal       Family History   Problem Relation Age of Onset    Stroke Father     Heart Disease Brother     High Blood Pressure Other        CareTeam (Including outside providers/suppliers regularly involved in providing care):   Patient Care Team:  King Ella MD as PCP - General (Family Medicine)  King Ella MD as PCP - REHABILITATION [facility] Empaneled Provider    Wt Readings from Last 3 Encounters:   10/18/19 137 lb 6.4 oz (62.3 kg)   08/20/19 135 lb (61.2 kg)   07/11/19 137 lb 6.4 oz (62.3 kg)     There were no vitals filed for this visit. There is no height or weight on file to calculate BMI.     Based

## 2020-06-23 ENCOUNTER — TELEPHONE (OUTPATIENT)
Dept: FAMILY MEDICINE CLINIC | Age: 80
End: 2020-06-23

## 2020-06-26 ENCOUNTER — NURSE ONLY (OUTPATIENT)
Dept: FAMILY MEDICINE CLINIC | Age: 80
End: 2020-06-26

## 2020-06-26 DIAGNOSIS — E78.2 MIXED HYPERLIPIDEMIA: ICD-10-CM

## 2020-06-26 DIAGNOSIS — I10 ESSENTIAL HYPERTENSION: ICD-10-CM

## 2020-06-26 DIAGNOSIS — E11.69 DM TYPE 2 WITH DIABETIC DYSLIPIDEMIA (HCC): ICD-10-CM

## 2020-06-26 DIAGNOSIS — E78.5 DM TYPE 2 WITH DIABETIC DYSLIPIDEMIA (HCC): ICD-10-CM

## 2020-06-26 LAB
CREATININE URINE POCT: 200
HCT VFR BLD CALC: 42 % (ref 36–48)
HEMOGLOBIN: 14 G/DL (ref 12–16)
MCH RBC QN AUTO: 29.9 PG (ref 26–34)
MCHC RBC AUTO-ENTMCNC: 33.3 G/DL (ref 31–36)
MCV RBC AUTO: 89.8 FL (ref 80–100)
MICROALBUMIN/CREAT 24H UR: 30 MG/G{CREAT}
MICROALBUMIN/CREAT UR-RTO: <30
PDW BLD-RTO: 14.6 % (ref 12.4–15.4)
PLATELET # BLD: 233 K/UL (ref 135–450)
PMV BLD AUTO: 7.6 FL (ref 5–10.5)
RBC # BLD: 4.68 M/UL (ref 4–5.2)
WBC # BLD: 5.6 K/UL (ref 4–11)

## 2020-06-26 PROCEDURE — 82044 UR ALBUMIN SEMIQUANTITATIVE: CPT | Performed by: FAMILY MEDICINE

## 2020-06-27 LAB
A/G RATIO: 2.4 (ref 1.1–2.2)
ALBUMIN SERPL-MCNC: 4.5 G/DL (ref 3.4–5)
ALP BLD-CCNC: 55 U/L (ref 40–129)
ALT SERPL-CCNC: 24 U/L (ref 10–40)
ANION GAP SERPL CALCULATED.3IONS-SCNC: 13 MMOL/L (ref 3–16)
AST SERPL-CCNC: 30 U/L (ref 15–37)
BILIRUB SERPL-MCNC: 0.4 MG/DL (ref 0–1)
BUN BLDV-MCNC: 29 MG/DL (ref 7–20)
CALCIUM SERPL-MCNC: 10 MG/DL (ref 8.3–10.6)
CHLORIDE BLD-SCNC: 100 MMOL/L (ref 99–110)
CHOLESTEROL, TOTAL: 153 MG/DL (ref 0–199)
CO2: 28 MMOL/L (ref 21–32)
CREAT SERPL-MCNC: 1.5 MG/DL (ref 0.6–1.2)
ESTIMATED AVERAGE GLUCOSE: 151.3 MG/DL
GFR AFRICAN AMERICAN: 41
GFR NON-AFRICAN AMERICAN: 34
GLOBULIN: 1.9 G/DL
GLUCOSE BLD-MCNC: 153 MG/DL (ref 70–99)
HBA1C MFR BLD: 6.9 %
HDLC SERPL-MCNC: 27 MG/DL (ref 40–60)
LDL CHOLESTEROL CALCULATED: ABNORMAL MG/DL
LDL CHOLESTEROL DIRECT: 75 MG/DL
POTASSIUM SERPL-SCNC: 3.9 MMOL/L (ref 3.5–5.1)
SODIUM BLD-SCNC: 141 MMOL/L (ref 136–145)
TOTAL PROTEIN: 6.4 G/DL (ref 6.4–8.2)
TRIGL SERPL-MCNC: 374 MG/DL (ref 0–150)
TSH REFLEX: 1.24 UIU/ML (ref 0.27–4.2)
VLDLC SERPL CALC-MCNC: ABNORMAL MG/DL

## 2020-07-13 RX ORDER — FENOFIBRATE 160 MG/1
TABLET ORAL
Qty: 90 TABLET | Refills: 1 | Status: SHIPPED | OUTPATIENT
Start: 2020-07-13 | End: 2021-01-08

## 2020-08-19 ENCOUNTER — OFFICE VISIT (OUTPATIENT)
Dept: ORTHOPEDIC SURGERY | Age: 80
End: 2020-08-19
Payer: MEDICARE

## 2020-08-19 PROCEDURE — E0135 WALKER FOLDING ADJUST/FIXED: HCPCS | Performed by: PHYSICIAN ASSISTANT

## 2020-08-19 PROCEDURE — 99213 OFFICE O/P EST LOW 20 MIN: CPT | Performed by: PHYSICIAN ASSISTANT

## 2020-08-19 RX ORDER — HYDROCODONE BITARTRATE AND ACETAMINOPHEN 5; 325 MG/1; MG/1
1 TABLET ORAL EVERY 8 HOURS PRN
Qty: 15 TABLET | Refills: 0 | Status: SHIPPED | OUTPATIENT
Start: 2020-08-19 | End: 2020-08-24

## 2020-08-19 NOTE — PROGRESS NOTES
CHIEF COMPLAINT:    Chief Complaint   Patient presents with    Pain     Right leg / shin - fell earlier today and landed on her leg. HISTORY OF PRESENT ILLNESS:                The patient is a 68 y.o. female who presents to the clinic for right lower leg pain. She had a fall earlier today where she landed on the leg. She states she hit the anterior aspect of the proximal tibia against a curb. She points directly over the area of impact for pain. Pain is worsened with ambulation and relieved by rest.  She presents to clinic today in a wheelchair.     Past Medical History:   Diagnosis Date    Anal fissure 1/7/2013    Back pain     Chronic insomnia     Depression     GERD (gastroesophageal reflux disease)     Dr. Britni Sims (GI)    Glossitis 3/16/2017    Hearing loss     Hypercholesteremia     Hypertension     Lumbar radiculopathy 3/3/2017    Patellofemoral stress syndrome of right knee 3/3/2017        Work Status: retired    The pain assessment was noted & is as follows:  Pain Assessment  Location of Pain: Leg  Location Modifiers: Right  Severity of Pain: 10  Quality of Pain: Throbbing, Sharp, Dull, Aching  Duration of Pain: A few hours  Frequency of Pain: Constant  Date Pain First Started: 08/19/20  Aggravating Factors: Bending, Stretching, Straightening, Walking, Stairs, Standing  Limiting Behavior: Yes  Relieving Factors: Rest  Result of Injury: Yes  Work-Related Injury: No  Are there other pain locations you wish to document?: No]      Work Status/Functionality:     Past Medical History: Medical history form was reviewed today & can be found in the media tab  Past Medical History:   Diagnosis Date    Anal fissure 1/7/2013    Back pain     Chronic insomnia     Depression     GERD (gastroesophageal reflux disease)     Dr. Britni Sims (GI)    Glossitis 3/16/2017    Hearing loss     Hypercholesteremia     Hypertension     Lumbar radiculopathy 3/3/2017    Patellofemoral stress syndrome of right current alcohol use. She reports that she does not use drugs. Family History:   Family History   Problem Relation Age of Onset    Stroke Father     Heart Disease Brother     High Blood Pressure Other        REVIEW OF SYSTEMS:   For new problems, a full review of systems will be found scanned in the patient's chart. CONSTITUTIONAL: Denies unexplained weight loss, fevers, chills   NEUROLOGICAL: Denies unsteady gait or progressive weakness  SKIN: Denies skin changes, delayed healing, rash, itching       PHYSICAL EXAM:    Vitals: There were no vitals taken for this visit. GENERAL EXAM:  · General Apparence: Patient is adequately groomed with no evidence of malnutrition. · Orientation: The patient is oriented to time, place and person. · Mood & Affect:The patient's mood and affect are appropriate       Right lower leg PHYSICAL EXAMINATION:  · Inspection: Visible edema along the anterior aspect of the proximal tibia    · Palpation: Tenderness to palpation directly over the contused area    · Range of Motion: She is able to perform active extension of the knee however, this is painful    · Strength: Not tested secondary to pain    · Special Tests: Calf is soft and nontender. Compartments are soft. Distal pulses are equal bilaterally. · Skin:  There are no rashes, ulcerations or lesions. · There are no dysvascular changes     Gait & station: She is in a wheelchair today      Additional Examinations:        Left Lower Extremity: Examination of the left lower extremity does not show any tenderness, deformity or injury. Range of motion is unremarkable. There is no gross instability. There are no rashes, ulcerations or lesions. Strength and tone are normal.      Diagnostic Testing: The following x rays were read and interpreted by myself      1.  2 X-rays of the right tib-fib were taken today and reveal normal anatomy with no acute fractures.   She does have some degenerative changes left with a very small amount of Norco to help with her acute pain. Controlled Substance Monitoring:    Acute and Chronic Pain Monitoring:   RX Monitoring 8/19/2020   Periodic Controlled Substance Monitoring No signs of potential drug abuse or diversion identified. I spent 15 minutes face-to-face with the patient and greater than 50% of that time was spent counseling/coordinating care for the above-stated diagnosis       Ollie Valenzuela, [facility]    This dictation was performed with a verbal recognition program (DRAGON) and it was checked for errors. It is possible that there are still dictated errors within this office note. If so, please bring any errors to my attention for an addendum. All efforts were made to ensure that this office note is accurate.

## 2020-08-24 ENCOUNTER — OFFICE VISIT (OUTPATIENT)
Dept: ORTHOPEDIC SURGERY | Age: 80
End: 2020-08-24
Payer: MEDICARE

## 2020-08-24 VITALS — HEIGHT: 64 IN | WEIGHT: 135 LBS | BODY MASS INDEX: 23.05 KG/M2

## 2020-08-24 PROCEDURE — MISC250 COMPRESSION STOCKING: Performed by: ORTHOPAEDIC SURGERY

## 2020-08-24 PROCEDURE — 99213 OFFICE O/P EST LOW 20 MIN: CPT | Performed by: ORTHOPAEDIC SURGERY

## 2020-08-24 NOTE — PROGRESS NOTES
CHIEF COMPLAINT:    Chief Complaint   Patient presents with    Knee Pain     rt leg pain after a fall on 8/19, seen in [facility], doing better, has lump on leg       HISTORY OF PRESENT ILLNESS:                The patient is a 68 y.o. female who presents today for new patient evaluation of right anterior tibial pain. Patient was seen on Wednesday night in the after-hours clinic by Yohana Chris PA-C . Patient is sent in for orthopedic consultation referred by her primary care physician Dr. Devi Guzman. She had a fall Wednesday at the [address] impacting her right leg into a curb. She states she hit the anterior aspect of the proximal tibia against a curb. She points directly over the area of impact for pain. Pain is worsened with ambulation and relieved by rest.  She was wrapped for compression and was instructed on utilizing a walker for weightbearing. She was to be only partial weightbearing. She states that since Wednesday her symptoms have improved approximately 50% and the swelling has significantly improved. She has continued to complain of proximal tibial pain and swelling into her foot.     Past Medical History:   Diagnosis Date    Anal fissure 1/7/2013    Back pain     Chronic insomnia     Depression     GERD (gastroesophageal reflux disease)     Dr. Terra Daly (GI)    Glossitis 3/16/2017    Hearing loss     Hypercholesteremia     Hypertension     Lumbar radiculopathy 3/3/2017    Patellofemoral stress syndrome of right knee 3/3/2017        Work Status: retired    The pain assessment was noted & is as follows:  Pain Assessment  Location of Pain: Leg  Location Modifiers: Right  Severity of Pain: 7  Frequency of Pain: Intermittent  Aggravating Factors: Walking, Standing  Limiting Behavior: Yes  Result of Injury: Yes  Work-Related Injury: No  Are there other pain locations you wish to document?: No]      Work Status/Functionality:     Past Medical History: Medical history form was reviewed today & can be found in the media tab  Past Medical History:   Diagnosis Date    Anal fissure 1/7/2013    Back pain     Chronic insomnia     Depression     GERD (gastroesophageal reflux disease)     Dr. Latanya aShni (GI)    Glossitis 3/16/2017    Hearing loss     Hypercholesteremia     Hypertension     Lumbar radiculopathy 3/3/2017    Patellofemoral stress syndrome of right knee 3/3/2017      Past Surgical History:     Past Surgical History:   Procedure Laterality Date    APPENDECTOMY      CHOLECYSTECTOMY      CHOLECYSTECTOMY      COLONOSCOPY  1/18/2016    Normal    HYSTERECTOMY, TOTAL ABDOMINAL      KNEE SURGERY  left knee    OVARY REMOVAL      unknown which side    UPPER GASTROINTESTINAL ENDOSCOPY  1/18/2016    Normal     Current Medications:     Current Outpatient Medications:     citalopram (CELEXA) 10 MG tablet, TAKE ONE TABLET BY MOUTH DAILY, Disp: 90 tablet, Rfl: 3    atorvastatin (LIPITOR) 40 MG tablet, TAKE ONE TABLET BY MOUTH DAILY, Disp: 90 tablet, Rfl: 3    HYDROcodone-acetaminophen (NORCO) 5-325 MG per tablet, Take 1 tablet by mouth every 8 hours as needed for Pain for up to 5 days.  Take lowest dose possible to manage pain, Disp: 15 tablet, Rfl: 0    fenofibrate (TRIGLIDE) 160 MG tablet, TAKE ONE TABLET BY MOUTH DAILY, Disp: 90 tablet, Rfl: 1    montelukast (SINGULAIR) 10 MG tablet, Take 1 tablet by mouth nightly, Disp: 90 tablet, Rfl: 3    amitriptyline (ELAVIL) 25 MG tablet, Take 1 tablet by mouth nightly as needed for Sleep (Patient not taking: Reported on 6/18/2020), Disp: 90 tablet, Rfl: 1    SITagliptin (JANUVIA) 50 MG tablet, Take 1 tablet by mouth daily, Disp: 90 tablet, Rfl: 1    triamterene-hydrochlorothiazide (MAXZIDE-25) 37.5-25 MG per tablet, TAKE 1 TABLET BY MOUTH ONCE DAILY, Disp: 90 tablet, Rfl: 1    metoprolol succinate (TOPROL XL) 100 MG extended release tablet, Take 1 tablet by mouth nightly, Disp: 90 tablet, Rfl: 1    losartan (COZAAR) 50 MG tablet, TAKE ONE TABLET BY motion is unremarkable. There is no gross instability. There are no rashes, ulcerations or lesions. Strength and tone are normal.      Diagnostic Testing:  None performed today    Orders     Orders Placed This Encounter   Procedures    COMPRESSION STOCKING     Patient was supplied a Compression Sleeve. This retail item was supplied to provide functional support and assist in controlling swelling in the lower extremities. Verbal and written instructions for the use of and application of this item were provided. The patient was educated and fit by a healthcare professional with expert knowledge and specialization in brace application. They were instructed to contact the office immediately should the equipment result in increased pain, decreased sensation, increased swelling or worsening of the condition. Scheduling Instructions:      THIGH HIGH         Assessment / Treatment Plan:     1. Contusion right tibia    2. Hematoma right tibia    I discussed with the patient and her  the nature of this injury and the likelihood did develop a hematoma directly over the anterior aspect of the tibia. I recommended that she utilize ice, elevation and thigh-high compression stockings to help minimize this as much as possible. Local wound care was prescribed and she will follow-up in 1 week for repeat clinical examination. May continue with over-the-counter medication as needed. Controlled Substance Monitoring:    Acute and Chronic Pain Monitoring:   RX Monitoring 8/19/2020   Periodic Controlled Substance Monitoring No signs of potential drug abuse or diversion identified. Sam Lentz MD    This dictation was performed with a verbal recognition program [employer]) and it was checked for errors. It is possible that there are still dictated errors within this office note. If so, please bring any errors to my attention for an addendum.  All efforts were made to ensure that this office note is accurate.

## 2020-08-28 RX ORDER — LOSARTAN POTASSIUM 50 MG/1
TABLET ORAL
Qty: 90 TABLET | Refills: 0 | Status: SHIPPED | OUTPATIENT
Start: 2020-08-28 | End: 2020-11-20

## 2020-08-28 NOTE — TELEPHONE ENCOUNTER
Refill Request     Last Seen: 6/18/2020    Last Written: 2/19/2020    Next Appointment:   Future Appointments   Date Time Provider Jones Berg   8/31/2020  3:30 PM MD ERIC Forte AND [facility]             Requested Prescriptions     Pending Prescriptions Disp Refills    losartan (COZAAR) 50 MG tablet [Pharmacy Med Name: LOSARTAN POTASSIUM 50 MG TAB] 90 tablet 0     Sig: TAKE ONE TABLET BY MOUTH DAILY

## 2020-08-31 ENCOUNTER — OFFICE VISIT (OUTPATIENT)
Dept: ORTHOPEDIC SURGERY | Age: 80
End: 2020-08-31
Payer: MEDICARE

## 2020-08-31 PROCEDURE — 99213 OFFICE O/P EST LOW 20 MIN: CPT | Performed by: ORTHOPAEDIC SURGERY

## 2020-08-31 NOTE — PROGRESS NOTES
CHIEF COMPLAINT:    Chief Complaint   Patient presents with    Follow-up     RIGHT knee/leg doing better, struggled to get compression hose on, and use walker, but has been compliant with ice/elevation/activity modification       HISTORY OF PRESENT ILLNESS:                Returns today for a follow-up on her right knee and lower leg contusion and lower leg hematoma. Overall she continues to do better. She still trying to wear compression Ace bandages but has also been icing and elevating fairly regularly. Pain she reports to be an intermittent 4/10.     Past Medical History:   Diagnosis Date    Anal fissure 1/7/2013    Back pain     Chronic insomnia     Depression     GERD (gastroesophageal reflux disease)     Dr. Oneil Dunn (GI)    Glossitis 3/16/2017    Hearing loss     Hypercholesteremia     Hypertension     Lumbar radiculopathy 3/3/2017    Patellofemoral stress syndrome of right knee 3/3/2017        Work Status: retired    The pain assessment was noted & is as follows:  Pain Assessment  Location of Pain: Knee  Location Modifiers: Right  Severity of Pain: 4  Quality of Pain: Sharp, Aching(stinging pain)  Duration of Pain: Persistent  Frequency of Pain: Constant  Aggravating Factors: Bending, Straightening, Walking, Standing, Squatting  Limiting Behavior: Some  Relieving Factors: Rest, Ice, Nsaids, Other (Comment)]      Work Status/Functionality:     Past Medical History: Medical history form was reviewed today & can be found in the media tab  Past Medical History:   Diagnosis Date    Anal fissure 1/7/2013    Back pain     Chronic insomnia     Depression     GERD (gastroesophageal reflux disease)     Dr. Oneil Dunn (GI)    Glossitis 3/16/2017    Hearing loss     Hypercholesteremia     Hypertension     Lumbar radiculopathy 3/3/2017    Patellofemoral stress syndrome of right knee 3/3/2017      Past Surgical History:     Past Surgical History:   Procedure Laterality Date    APPENDECTOMY     

## 2020-09-14 ENCOUNTER — OFFICE VISIT (OUTPATIENT)
Dept: ORTHOPEDIC SURGERY | Age: 80
End: 2020-09-14
Payer: MEDICARE

## 2020-09-14 PROCEDURE — 99213 OFFICE O/P EST LOW 20 MIN: CPT | Performed by: PHYSICIAN ASSISTANT

## 2020-09-14 NOTE — PROGRESS NOTES
Chief Complaint    Follow-up (RIGHT knee swelling persists with wound on anterior aspect; swelling in leg decreaed, but continues to have sharp shooting pains in leg with ambulation/WB activity)      History of Present Illness:  Ricarda Kimbrough is a 68 y.o. female returns today for reevaluation and follow-up regarding her right lower leg hematoma. Overall she does seem to be doing better. The swelling and discoloration have decreased. She continues to remain up and on her feet a lot despite our attempts and recommendations to use a walker. She is reporting some increasing discomfort over the anterior aspect of the lower leg. Pain Assessment  Location of Pain: Knee  Location Modifiers: Right, Anterior  Severity of Pain: 6  Quality of Pain: Sharp, Aching  Duration of Pain: Persistent  Frequency of Pain: Constant  Aggravating Factors: Bending, Walking, Standing, Straightening  Limiting Behavior: Some  Relieving Factors: Rest    Medical History:  Patient's medications, allergies, past medical, surgical, social and family histories were reviewed and updated as appropriate. Vital Signs: There were no vitals taken for this visit. Lower Leg Examination:    Examination of the left lower leg continues reveal a firm hematoma over the anterior aspect of the knee. There is a small superficial abrasion which seems to be healing nicely without any signs of any infection. The swelling the discoloration of the lower leg has improved significantly. She still has some tenderness to palpation over the soft tissue musculature including the anterior tibialis and extensor digitorum of the lower leg. She is really nontender over the tibia. No signs of DVT. Neurovascular exam is intact. No evidence of any compartment syndrome.     Additional Comments:         Radiology:     X-rays obtained and reviewed in office:  Views 2 views including AP and lateral  Location left tib-fib  Impression no evidence of any

## 2020-09-28 ENCOUNTER — OFFICE VISIT (OUTPATIENT)
Dept: ORTHOPEDIC SURGERY | Age: 80
End: 2020-09-28
Payer: MEDICARE

## 2020-09-28 PROCEDURE — 99213 OFFICE O/P EST LOW 20 MIN: CPT | Performed by: PHYSICIAN ASSISTANT

## 2020-09-28 NOTE — PROGRESS NOTES
Chief Complaint    Leg Pain (rt leg contusion, pushed alot of jelly stuff, still having pain)      History of Present Illness:  Jazz Henry is a 68 y.o. female returns today for reevaluation and follow-up regarding her right lower leg hematoma. Late last week and over the weekend the patient was actually able to decompress the hematoma on her own and reported a lot of dark, current jelly looking material coming from the wound. .  The swelling and discoloration have decreased. She continues to remain up and on her feet a lot despite our attempts and recommendations to use a walker. She is reporting some increasing discomfort over the anterior aspect of the lower leg and even hypersensitivity to light touch. Pain Assessment  Location of Pain: Leg  Location Modifiers: Right  Severity of Pain: 2  Frequency of Pain: Intermittent  Aggravating Factors: Standing  Limiting Behavior: Some  Result of Injury: Yes  Work-Related Injury: No  Are there other pain locations you wish to document?: No    Medical History:  Patient's medications, allergies, past medical, surgical, social and family histories were reviewed and updated as appropriate. Vital Signs: There were no vitals taken for this visit. Lower Leg Examination:    Examination of the left lower leg feels a significant reduction in the hematoma as the patient has self expressed this. She still has some swelling in the area. There is a small superficial abrasion which seems to be healing nicely without any signs of any infection. Mild serous drainage from the wound is noted with pressure. The swelling the discoloration of the lower leg has improved significantly. She still has some tenderness to palpation over the soft tissue musculature including the anterior tibialis and extensor digitorum of the lower leg. Hypersensitivity present over the common peroneal nerve. She is really nontender over the tibia. No signs of DVT.   Neurovascular exam is intact. No evidence of any compartment syndrome. Impression:  Encounter Diagnosis   Name Primary?  Contusion of right knee, subsequent encounter Yes       Office Procedures:  No orders of the defined types were placed in this encounter. Treatment Plan: The hematoma itself has decreased significantly. I would recommend she continue with local wound care and no need for antibiotics. She is having a lot of hypersensitivity of the common peroneal nerve and recommend she try over-the-counter lidocaine cream or lidocaine patches to see if this will help alleviate some of the neuritis that she has encountered from the injury. We will see her back in 2 weeks for repeat clinical exam and reevaluation.

## 2020-10-01 RX ORDER — SITAGLIPTIN 50 MG/1
TABLET, FILM COATED ORAL
Qty: 90 TABLET | Refills: 0 | Status: SHIPPED | OUTPATIENT
Start: 2020-10-01 | End: 2021-03-09

## 2020-10-01 RX ORDER — TRIAMTERENE AND HYDROCHLOROTHIAZIDE 37.5; 25 MG/1; MG/1
TABLET ORAL
Qty: 90 TABLET | Refills: 0 | Status: SHIPPED | OUTPATIENT
Start: 2020-10-01 | End: 2021-01-04

## 2020-10-01 NOTE — TELEPHONE ENCOUNTER
Refill Request     Last Seen: 6/18/2020    Last Written: Mariel Enamorado #90  1rf  4/7/2020  Triamterene?hctz #90  1rf  4/6/2020    Next Appointment:   Future Appointments   Date Time Provider Jones Morena   10/12/2020 11:15 AM URIEL [facility]       Appointment scheduled      Requested Prescriptions     Pending Prescriptions Disp Refills    JANUVIA 50 MG tablet [Pharmacy Med Name: Humble Reach 50 MG TABLET] 90 tablet 0     Sig: TAKE ONE TABLET BY MOUTH DAILY    triamterene-hydroCHLOROthiazide (MAXZIDE-25) 37.5-25 MG per tablet [Pharmacy Med Name: Sinclair Gall 37.5-25 MG TB] 90 tablet 0     Sig: TAKE ONE TABLET BY MOUTH DAILY

## 2020-10-12 ENCOUNTER — OFFICE VISIT (OUTPATIENT)
Dept: ORTHOPEDIC SURGERY | Age: 80
End: 2020-10-12
Payer: MEDICARE

## 2020-10-12 PROCEDURE — 99212 OFFICE O/P EST SF 10 MIN: CPT | Performed by: PHYSICIAN ASSISTANT

## 2020-10-12 NOTE — PROGRESS NOTES
Chief Complaint    Follow-up (s/p 9 weeks RIGHT knee contusion; continues to have some pain with palpation in proximal lateral and posterior calf due to swelling)      History of Present Illness:  Hieu Barbosa is a 68 y.o. female returns today for reevaluation and follow-up regarding her right lower leg hematoma. Patient has been improving and she is only had mild reaccumulation of swelling over the proximal lateral tibia. Continues to treat the nickel size scab over the anterior aspect of her tibia. We did place a large Band-Aid across this today so it would not rub on her close. There is no surrounding areas of erythema or drainage noted. The neuritis that she was getting from the large hematoma over her peroneal nerve has significantly improved. She has no weakness into her right foot with ambulation. Pain Assessment  Location of Pain: Knee  Location Modifiers: Right  Severity of Pain: 2(with WB activity; with palpation 5/10)  Quality of Pain: Sharp, Dull  Duration of Pain: Persistent  Frequency of Pain: Intermittent  Aggravating Factors: Walking, Standing  Limiting Behavior: Some  Relieving Factors: Rest    Medical History:  Patient's medications, allergies, past medical, surgical, social and family histories were reviewed and updated as appropriate. Vital Signs: There were no vitals taken for this visit. Lower Leg Examination:    Examination of the left lower leg reveals only mild swelling over the proximal lateral tibia with a healing nickel sized scab. There is no surrounding areas of erythema or drainage noted. The sensation over the left lateral aspect of her calf into her ankle flexed as return and there is no weakness upon dorsiflexion or plantarflexion. Still neurovascular is grossly intact. Impression:  No diagnosis found. Office Procedures:  No orders of the defined types were placed in this encounter.       Treatment Plan: The hematoma itself has decreased significantly. I would recommend she continue with local wound care and no need for antibiotics. She is to continue with icing and she may continue with the lidocaine cream as needed. She will follow-up in 3 weeks for repeat clinical examination.

## 2020-10-14 ENCOUNTER — TELEPHONE (OUTPATIENT)
Dept: FAMILY MEDICINE CLINIC | Age: 80
End: 2020-10-14

## 2020-10-14 NOTE — TELEPHONE ENCOUNTER
----- Message from Padmini Martel sent at 10/14/2020 11:12 AM EDT -----  Subject: Medication Problem    QUESTIONS  Name of Medication? RICKYUVIA 50 MG tablet  Patient-reported dosage and instructions? one a day  What is the problem with the medication? medication has increased in   price. She was originally paying around $120 now its $1000. . wants to know   if she needs to keep taking medicine. she is currently out  Preferred Pharmacy? [address] [address] [facility]  Pharmacy phone number (if available)? [phone]  Additional Information for Provider?   ---------------------------------------------------------------------------  --------------  CALL BACK INFO  What is the best way for the office to contact you? OK to leave message on   voicemail  Preferred Call Back Phone Number? [phone]  ---------------------------------------------------------------------------  --------------  SCRIPT ANSWERS  Relationship to Patient?  Self

## 2020-10-26 RX ORDER — GLIMEPIRIDE 2 MG/1
2 TABLET ORAL
Qty: 30 TABLET | Refills: 5 | Status: SHIPPED | OUTPATIENT
Start: 2020-10-26 | End: 2021-03-09

## 2020-10-26 NOTE — TELEPHONE ENCOUNTER
Please let patient know I was out of the office, which is why this did not get responded to. The provider covering for me felt it was best that I address this for her. I recommend starting glimepiride.  It is cheaper, and hopefully will work well at keeping her sugar normal.

## 2020-11-02 ENCOUNTER — OFFICE VISIT (OUTPATIENT)
Dept: ORTHOPEDIC SURGERY | Age: 80
End: 2020-11-02
Payer: MEDICARE

## 2020-11-02 PROCEDURE — 99212 OFFICE O/P EST SF 10 MIN: CPT | Performed by: PHYSICIAN ASSISTANT

## 2020-11-20 RX ORDER — LOSARTAN POTASSIUM 50 MG/1
TABLET ORAL
Qty: 90 TABLET | Refills: 0 | Status: SHIPPED | OUTPATIENT
Start: 2020-11-20 | End: 2021-02-15

## 2021-01-08 DIAGNOSIS — E78.2 MIXED HYPERLIPIDEMIA: ICD-10-CM

## 2021-01-08 RX ORDER — FENOFIBRATE 160 MG/1
TABLET ORAL
Qty: 90 TABLET | Refills: 0 | Status: SHIPPED | OUTPATIENT
Start: 2021-01-08 | End: 2021-04-20

## 2021-01-08 NOTE — TELEPHONE ENCOUNTER
Refill Request     Last Seen: 6/18/2020    Last Written: #90  1rf  7/13/2020    Next Appointment:   Future Appointments   Date Time Provider Jones Berg   2/25/2021  8:30 AM MD TOBIAS Hernandez             Requested Prescriptions     Pending Prescriptions Disp Refills    fenofibrate (TRIGLIDE) 160 MG tablet [Pharmacy Med Name: FENOFIBRATE 160 MG TABLET] 90 tablet 0     Sig: TAKE ONE TABLET BY MOUTH DAILY

## 2021-02-14 ENCOUNTER — TELEPHONE (OUTPATIENT)
Dept: FAMILY MEDICINE CLINIC | Age: 81
End: 2021-02-14

## 2021-02-14 DIAGNOSIS — R05.9 COUGH: Primary | ICD-10-CM

## 2021-02-14 DIAGNOSIS — R06.2 WHEEZING: ICD-10-CM

## 2021-02-14 RX ORDER — ALBUTEROL SULFATE 90 UG/1
2 AEROSOL, METERED RESPIRATORY (INHALATION) EVERY 6 HOURS PRN
Qty: 1 INHALER | Refills: 2 | Status: ON HOLD | OUTPATIENT
Start: 2021-02-14 | End: 2021-12-08

## 2021-02-14 RX ORDER — AZITHROMYCIN 250 MG/1
250 TABLET, FILM COATED ORAL SEE ADMIN INSTRUCTIONS
Qty: 6 TABLET | Refills: 0 | Status: SHIPPED | OUTPATIENT
Start: 2021-02-14 | End: 2021-02-19

## 2021-02-14 RX ORDER — METHYLPREDNISOLONE 4 MG/1
TABLET ORAL
Qty: 1 KIT | Refills: 0 | Status: SHIPPED | OUTPATIENT
Start: 2021-02-14 | End: 2021-02-20

## 2021-02-15 DIAGNOSIS — I10 ESSENTIAL HYPERTENSION: ICD-10-CM

## 2021-02-15 RX ORDER — LOSARTAN POTASSIUM 50 MG/1
TABLET ORAL
Qty: 90 TABLET | Refills: 0 | Status: SHIPPED | OUTPATIENT
Start: 2021-02-15 | End: 2021-05-25

## 2021-02-15 NOTE — TELEPHONE ENCOUNTER
.  Last office visit 6/18/2020     Last written 11/20/20 90 with 0      Next office visit scheduled 2/25/2021    Requested Prescriptions     Pending Prescriptions Disp Refills    losartan (COZAAR) 50 MG tablet [Pharmacy Med Name: LOSARTAN POTASSIUM 50 MG TAB] 90 tablet 0     Sig: TAKE ONE TABLET BY MOUTH DAILY

## 2021-02-24 ENCOUNTER — TELEPHONE (OUTPATIENT)
Dept: FAMILY MEDICINE CLINIC | Age: 81
End: 2021-02-24

## 2021-02-24 NOTE — TELEPHONE ENCOUNTER
----- Message from Re Hernnadez sent at 2/24/2021  8:47 AM EST -----  Subject: Appointment Request    Reason for Call: Routine Medicare AWV    QUESTIONS  Type of Appointment? Established Patient  Reason for appointment request? No appointments available during search  Additional Information for Provider? Patient would like to reschedule AWV   for next week. Patient would like a call back as soon as possible  ---------------------------------------------------------------------------  --------------  CALL BACK INFO  What is the best way for the office to contact you? OK to leave message on   voicemail  Preferred Call Back Phone Number? [phone]  ---------------------------------------------------------------------------  --------------  SCRIPT ANSWERS  Relationship to Patient? Self  Have your symptoms changed? No  Appointment reason? Well Care/Follow Ups  Select a Well Care/Follow Ups appointment reason? Adult Physical Exam   [Medicare Annual Wellness   AWV   PAP   Pelvic]  (If the patient is Medicare / 65+ ask this question) Are you requesting a   Medicare Annual Wellness Visit? Yes   (If the patient is female   ask this question) Are you requesting a pap smear with your physical   exam? No  (Is the patient requesting their annual physical and does not need PAP or   AWV per above)? No  Have you been diagnosed with   tested for   or told that you are suspected of having COVID-19 (Coronavirus)? No  Have you had a fever or taken medication to treat a fever within the past   3 days? No  Have you had a cough   shortness of breath or flu-like symptoms within the past 3 days? No  Do you currently have flu-like symptoms including fever or chills   cough   shortness of breath   or difficulty breathing   or new loss of taste or smell? No  (Service Expert  click yes below to proceed with [employer] As Usual   Scheduling)?  Yes

## 2021-02-24 NOTE — TELEPHONE ENCOUNTER
PT scheduled Medicare Wellness for 6/8. She only wanted to see Dr. Tania Yung. Pt wants to know if she can be seen sooner than 6/8 ?

## 2021-03-05 DIAGNOSIS — I10 ESSENTIAL HYPERTENSION: ICD-10-CM

## 2021-03-08 RX ORDER — METOPROLOL SUCCINATE 100 MG/1
TABLET, EXTENDED RELEASE ORAL
Qty: 90 TABLET | Refills: 0 | Status: SHIPPED | OUTPATIENT
Start: 2021-03-08 | End: 2021-06-07

## 2021-03-08 NOTE — TELEPHONE ENCOUNTER
Refill Request     Last Seen: 6/18/2020    Last Written: 3/07/2020    Next Appointment:   Future Appointments   Date Time Provider Jones Berg   3/9/2021  9:00 AM MD TOBIAS You   6/8/2021  2:30 PM MD TOBIAS You  [facility]       Future appointment scheduled      Requested Prescriptions     Pending Prescriptions Disp Refills    metoprolol succinate (TOPROL XL) 100 MG extended release tablet [Pharmacy Med Name: METOPROLOL SUCC  MG TAB] 90 tablet 0     Sig: TAKE ONE TABLET BY MOUTH ONCE NIGHTLY

## 2021-03-09 ENCOUNTER — OFFICE VISIT (OUTPATIENT)
Dept: FAMILY MEDICINE CLINIC | Age: 81
End: 2021-03-09
Payer: MEDICARE

## 2021-03-09 VITALS
SYSTOLIC BLOOD PRESSURE: 112 MMHG | WEIGHT: 138.8 LBS | DIASTOLIC BLOOD PRESSURE: 66 MMHG | TEMPERATURE: 97.2 F | BODY MASS INDEX: 24.59 KG/M2 | OXYGEN SATURATION: 99 % | HEIGHT: 63 IN | HEART RATE: 66 BPM

## 2021-03-09 DIAGNOSIS — R20.2 LEFT HAND PARESTHESIA: ICD-10-CM

## 2021-03-09 DIAGNOSIS — E11.69 DM TYPE 2 WITH DIABETIC DYSLIPIDEMIA (HCC): Primary | ICD-10-CM

## 2021-03-09 DIAGNOSIS — E78.5 DM TYPE 2 WITH DIABETIC DYSLIPIDEMIA (HCC): Primary | ICD-10-CM

## 2021-03-09 DIAGNOSIS — E78.2 MIXED HYPERLIPIDEMIA: ICD-10-CM

## 2021-03-09 DIAGNOSIS — R61 DIAPHORESIS: ICD-10-CM

## 2021-03-09 DIAGNOSIS — I10 ESSENTIAL HYPERTENSION: ICD-10-CM

## 2021-03-09 DIAGNOSIS — Z11.59 ENCOUNTER FOR HEPATITIS C SCREENING TEST FOR LOW RISK PATIENT: ICD-10-CM

## 2021-03-09 LAB
A/G RATIO: 2 (ref 1.1–2.2)
ALBUMIN SERPL-MCNC: 4.5 G/DL (ref 3.4–5)
ALP BLD-CCNC: 48 U/L (ref 40–129)
ALT SERPL-CCNC: 26 U/L (ref 10–40)
ANION GAP SERPL CALCULATED.3IONS-SCNC: 13 MMOL/L (ref 3–16)
AST SERPL-CCNC: 31 U/L (ref 15–37)
BASOPHILS ABSOLUTE: 0.1 K/UL (ref 0–0.2)
BASOPHILS RELATIVE PERCENT: 1.8 %
BILIRUB SERPL-MCNC: 0.4 MG/DL (ref 0–1)
BILIRUBIN, POC: NORMAL
BLOOD URINE, POC: NORMAL
BUN BLDV-MCNC: 26 MG/DL (ref 7–20)
CALCIUM SERPL-MCNC: 10.6 MG/DL (ref 8.3–10.6)
CHLORIDE BLD-SCNC: 102 MMOL/L (ref 99–110)
CHOLESTEROL, TOTAL: 159 MG/DL (ref 0–199)
CLARITY, POC: CLEAR
CO2: 27 MMOL/L (ref 21–32)
COLOR, POC: YELLOW
CREAT SERPL-MCNC: 1.3 MG/DL (ref 0.6–1.2)
EOSINOPHILS ABSOLUTE: 0.3 K/UL (ref 0–0.6)
EOSINOPHILS RELATIVE PERCENT: 5.7 %
FOLATE: >20 NG/ML (ref 4.78–24.2)
GFR AFRICAN AMERICAN: 48
GFR NON-AFRICAN AMERICAN: 40
GLOBULIN: 2.3 G/DL
GLUCOSE BLD-MCNC: 88 MG/DL (ref 70–99)
GLUCOSE URINE, POC: NORMAL
HBA1C MFR BLD: 6.1 %
HCT VFR BLD CALC: 41.6 % (ref 36–48)
HDLC SERPL-MCNC: 25 MG/DL (ref 40–60)
HEMOGLOBIN: 14 G/DL (ref 12–16)
HEPATITIS C ANTIBODY INTERPRETATION: NORMAL
KETONES, POC: NORMAL
LDL CHOLESTEROL CALCULATED: 77 MG/DL
LEUKOCYTE EST, POC: NORMAL
LYMPHOCYTES ABSOLUTE: 1.5 K/UL (ref 1–5.1)
LYMPHOCYTES RELATIVE PERCENT: 32.9 %
MCH RBC QN AUTO: 29.6 PG (ref 26–34)
MCHC RBC AUTO-ENTMCNC: 33.7 G/DL (ref 31–36)
MCV RBC AUTO: 88 FL (ref 80–100)
MONOCYTES ABSOLUTE: 0.3 K/UL (ref 0–1.3)
MONOCYTES RELATIVE PERCENT: 6 %
NEUTROPHILS ABSOLUTE: 2.4 K/UL (ref 1.7–7.7)
NEUTROPHILS RELATIVE PERCENT: 53.6 %
NITRITE, POC: NORMAL
PDW BLD-RTO: 15.1 % (ref 12.4–15.4)
PH, POC: 7
PLATELET # BLD: 214 K/UL (ref 135–450)
PMV BLD AUTO: 7.5 FL (ref 5–10.5)
POTASSIUM SERPL-SCNC: 4.1 MMOL/L (ref 3.5–5.1)
PROTEIN, POC: NORMAL
RBC # BLD: 4.72 M/UL (ref 4–5.2)
SODIUM BLD-SCNC: 142 MMOL/L (ref 136–145)
SPECIFIC GRAVITY, POC: 1.02
TOTAL PROTEIN: 6.8 G/DL (ref 6.4–8.2)
TRIGL SERPL-MCNC: 286 MG/DL (ref 0–150)
TSH REFLEX: 1.88 UIU/ML (ref 0.27–4.2)
UROBILINOGEN, POC: 0.2
VITAMIN B-12: 424 PG/ML (ref 211–911)
VLDLC SERPL CALC-MCNC: 57 MG/DL
WBC # BLD: 4.4 K/UL (ref 4–11)

## 2021-03-09 PROCEDURE — 99214 OFFICE O/P EST MOD 30 MIN: CPT | Performed by: FAMILY MEDICINE

## 2021-03-09 PROCEDURE — 83036 HEMOGLOBIN GLYCOSYLATED A1C: CPT | Performed by: FAMILY MEDICINE

## 2021-03-09 PROCEDURE — 81002 URINALYSIS NONAUTO W/O SCOPE: CPT | Performed by: FAMILY MEDICINE

## 2021-03-09 RX ORDER — PAROXETINE 10 MG/1
10 TABLET, FILM COATED ORAL DAILY
Qty: 30 TABLET | Refills: 3 | Status: SHIPPED | OUTPATIENT
Start: 2021-03-09 | End: 2021-06-21

## 2021-03-09 SDOH — ECONOMIC STABILITY: INCOME INSECURITY: HOW HARD IS IT FOR YOU TO PAY FOR THE VERY BASICS LIKE FOOD, HOUSING, MEDICAL CARE, AND HEATING?: NOT HARD AT ALL

## 2021-03-09 SDOH — ECONOMIC STABILITY: FOOD INSECURITY: WITHIN THE PAST 12 MONTHS, YOU WORRIED THAT YOUR FOOD WOULD RUN OUT BEFORE YOU GOT MONEY TO BUY MORE.: NEVER TRUE

## 2021-03-09 ASSESSMENT — PATIENT HEALTH QUESTIONNAIRE - PHQ9
5. POOR APPETITE OR OVEREATING: 0
1. LITTLE INTEREST OR PLEASURE IN DOING THINGS: 0
4. FEELING TIRED OR HAVING LITTLE ENERGY: 1
8. MOVING OR SPEAKING SO SLOWLY THAT OTHER PEOPLE COULD HAVE NOTICED. OR THE OPPOSITE, BEING SO FIGETY OR RESTLESS THAT YOU HAVE BEEN MOVING AROUND A LOT MORE THAN USUAL: 0
SUM OF ALL RESPONSES TO PHQ QUESTIONS 1-9: 3
SUM OF ALL RESPONSES TO PHQ QUESTIONS 1-9: 3

## 2021-03-09 ASSESSMENT — ANXIETY QUESTIONNAIRES
1. FEELING NERVOUS, ANXIOUS, OR ON EDGE: 1-SEVERAL DAYS
6. BECOMING EASILY ANNOYED OR IRRITABLE: 0-NOT AT ALL
3. WORRYING TOO MUCH ABOUT DIFFERENT THINGS: 1-SEVERAL DAYS
5. BEING SO RESTLESS THAT IT IS HARD TO SIT STILL: 0-NOT AT ALL
7. FEELING AFRAID AS IF SOMETHING AWFUL MIGHT HAPPEN: 0-NOT AT ALL
4. TROUBLE RELAXING: 0-NOT AT ALL

## 2021-03-09 NOTE — PROGRESS NOTES
Benito Ash (:  1940) is a 80 y.o. female,Established patient, here for evaluation of the following chief complaint(s):  Diabetes, Leg Pain (right since a fall over the summer, soft tissue injury), Sweats (perspires profusely, happening daily), and Other (unusual sensations in her left hand x 2 weeks)      ASSESSMENT/PLAN:  1. 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 Patient will hold her Amaryl as her glucose is doing quite well and she would be at risk for hypoglycemia. 2. Left hand paresthesia  -     TSH with Reflex  -     VITAMIN B12 & FOLATE  -     EMG; Future  3. Diaphoresis  -     TSH with Reflex  -     COMPREHENSIVE METABOLIC PANEL  -     CBC Auto Differential  -     VITAMIN B12 & FOLATE  -     XR CHEST STANDARD (2 VW); Future  -     POCT Urinalysis no Micro  -     PARoxetine (PAXIL) 10 MG tablet; Take 1 tablet by mouth daily, Disp-30 tablet, R-3Normal  Could be from her Celexa. Will change to paroxetine, as that often improves hot flashes, especially those from menopause. 4. Essential hypertension  This problem is well controlled. Pt should continue current medication at current dose. 5. Mixed hyperlipidemia  -     TSH with Reflex  -     COMPREHENSIVE METABOLIC PANEL  -     Lipid Panel  HDL low, triglycerides high on last check. Continue current meds until lab results obtain, will adjust dosing if necessary at that time. 6. Encounter for hepatitis C screening test for low risk patient  -     HEPATITIS C ANTIBODY      No follow-ups on file. SUBJECTIVE/OBJECTIVE:  HPI Here for diabetes follow up. No concerns about her sugars. Would like to get blood work done. Reports episodes of profuse sweating which happens daily, occurs during the day or night. She is concerned there is something wrong.  Went through menopause many years ago, does not know why she has begun having sweating again. Denies abdominal pain, facial flushing, or diarrhea. Takes Celexa. Describes unusual sensations in her left hand for the last 2 weeks. Having tingling feelings. Review of Systems-  right anterior lower leg pain since a fall over the summer at the [place] [id number]. Physical Exam  Vitals signs and nursing note reviewed. Constitutional:       Appearance: Normal appearance. She is normal weight. Pulmonary:      Effort: Pulmonary effort is normal.   Musculoskeletal:      Right wrist: Normal.      Left wrist: Normal.   Neurological:      General: No focal deficit present. Mental Status: She is alert. Sensory: No sensory deficit. Motor: No weakness. Comments: Positive Tinel's test on the left, negative Phalen's test bilaterally   Psychiatric:         Behavior: Behavior normal.          Results for POC orders placed in visit on 03/09/21   POCT glycosylated hemoglobin (Hb A1C)   Result Value Ref Range    Hemoglobin A1C 6.1 %   POCT Urinalysis no Micro   Result Value Ref Range    Color, UA yellow     Clarity, UA clear     Glucose, UA POC neg     Bilirubin, UA neg     Ketones, UA neg     Spec Grav, UA 1.020     Blood, UA POC neg     pH, UA 7.0     Protein, UA POC neg     Urobilinogen, UA 0.2     Leukocytes, UA neg     Nitrite, UA neg            An electronic signature was used to authenticate this note.     --Abdulaziz Herring MD

## 2021-03-09 NOTE — LETTER
[address]  [place] [address]  Phone: [phone]  Fax: [phone]    Rachael Biggs MD        March 15, 2021    [address] [place] [address]      Dear Rosemary Ordaz:    The office has made attempts to call you regarding lab results. Please contact the office for your information. If you have already received your results via [facility] or speaking with office staff please disregard. If you have any questions or concerns, please don't hesitate to call.     Sincerely,        Rachael Biggs MD

## 2021-03-15 ENCOUNTER — HOSPITAL ENCOUNTER (OUTPATIENT)
Dept: NEUROLOGY | Age: 81
Discharge: HOME | End: 2021-03-15
Payer: MEDICARE

## 2021-03-15 ENCOUNTER — HOSPITAL ENCOUNTER (OUTPATIENT)
Dept: GENERAL RADIOLOGY | Age: 81
Discharge: HOME | End: 2021-03-15
Payer: MEDICARE

## 2021-03-15 DIAGNOSIS — R20.2 LEFT HAND PARESTHESIA: ICD-10-CM

## 2021-03-15 DIAGNOSIS — R61 DIAPHORESIS: ICD-10-CM

## 2021-03-15 PROCEDURE — 95908 NRV CNDJ TST 3-4 STUDIES: CPT | Performed by: PHYSICAL MEDICINE & REHABILITATION

## 2021-03-15 PROCEDURE — 95885 MUSC TST DONE W/NERV TST LIM: CPT | Performed by: PHYSICAL MEDICINE & REHABILITATION

## 2021-03-15 PROCEDURE — 71046 X-RAY EXAM CHEST 2 VIEWS: CPT

## 2021-03-15 NOTE — PROCEDURES
EMG     Side Muscle Nerve Root Ins Act Fibs Psw Amp Dur Poly Recrt Int Devika García Comment   Left Deltoid Axillary C5-6 Nml Nml Nml Nml Nml 0 Nml Nml    Left Biceps Musculocut C5-6 Nml Nml Nml Nml Nml 0 Nml Nml    Left Triceps Radial C6-7-8 Nml Nml Nml Nml Nml 0 Nml Nml    Left BrachioRad Radial C5-6 Nml Nml Nml Nml Nml 0 Nml Nml    Left PronatorTeres Median C6-7 Nml Nml Nml Nml Nml 0 Nml Nml    Left Ext Indicis Radial (Post Int) C7-8 Nml Nml Nml Nml Nml 0 Nml Nml    Left 1stDorInt Ulnar C8-T1 Nml Nml Nml Nml Nml 0 Nml Nml    Left Abd Poll Brev Median C8-T1 Nml Nml Nml Nml Nml 0 Nml Nml    Left Cervical Parasp Up Rami C1-3 Nml Nml Nml         Left Cervical Parasp Mid Rami C4-6 Nml Nml Nml         Left Cervical Parasp Low Rami C7-8 Nml Nml Nml           Electronically signed by Austyn Hylton DO on 3/15/2021 at 2:05 PM\

## 2021-03-22 ENCOUNTER — TELEPHONE (OUTPATIENT)
Dept: FAMILY MEDICINE CLINIC | Age: 81
End: 2021-03-22

## 2021-03-22 NOTE — TELEPHONE ENCOUNTER
----- Message from Rupa Oshea sent at 3/22/2021  9:58 AM EDT -----  Subject: Results Request    QUESTIONS  Which lab or imaging result is the patient calling about? All Test   performed on 3/9/2021  Which provider ordered the test? Martha Fajardo   At what location was the test performed? Date the test was performed? 2021-03-09  Additional Information for Provider?   ---------------------------------------------------------------------------  --------------  [address]  What is the best way for the office to contact you? OK to leave message on   voicemail  Preferred Call Back Phone Number?  [phone]

## 2021-03-29 ENCOUNTER — TELEPHONE (OUTPATIENT)
Dept: FAMILY MEDICINE CLINIC | Age: 81
End: 2021-03-29

## 2021-03-29 NOTE — TELEPHONE ENCOUNTER
Nichole Arriola MD  P [facility]             Patient's EMG was consistent with moderate left carpal tunnel syndrome. I suggest seeing a hand specialist to discuss treatment options. She may get a carpal tunnel wrist brace, which can be bought over the counter, and wear it at night while sleeping. That can be helpful to alleviate the symptoms and heal the nerve at times.

## 2021-03-31 RX ORDER — TRIAMTERENE AND HYDROCHLOROTHIAZIDE 37.5; 25 MG/1; MG/1
TABLET ORAL
Qty: 90 TABLET | Refills: 1 | Status: SHIPPED | OUTPATIENT
Start: 2021-03-31 | End: 2021-10-06

## 2021-04-02 LAB
CATARACTS: POSITIVE
DIABETIC RETINOPATHY: NORMAL
GLAUCOMA: NEGATIVE
INTRAOCULAR PRESSURE EYE: NORMAL
VISUAL ACUITY DISTANCE LEFT EYE: NORMAL
VISUAL ACUITY DISTANCE RIGHT EYE: NORMAL

## 2021-05-19 RX ORDER — MONTELUKAST SODIUM 10 MG/1
TABLET ORAL
Qty: 90 TABLET | Refills: 1 | Status: SHIPPED | OUTPATIENT
Start: 2021-05-19 | End: 2021-11-19

## 2021-05-19 NOTE — TELEPHONE ENCOUNTER
Refill Request     Last Seen: Last Seen Department: 3/9/2021  Last Seen by PCP: 3/9/2021    Last Written: 5/27/2020    Next Appointment:   Future Appointments   Date Time Provider Jones Berg   6/8/2021  2:30 PM MD TOBIAS [facility]       Future appointment scheduled      Requested Prescriptions     Pending Prescriptions Disp Refills    montelukast (SINGULAIR) 10 MG tablet [Pharmacy Med Name: MONTELUKAST SOD 10 MG TABLET] 30 tablet 2     Sig: TAKE ONE TABLET BY MOUTH ONCE NIGHTLY

## 2021-05-21 ENCOUNTER — TELEPHONE (OUTPATIENT)
Dept: FAMILY MEDICINE CLINIC | Age: 81
End: 2021-05-21

## 2021-05-21 NOTE — TELEPHONE ENCOUNTER
LMOM for a return call. Pt appointment on 66/8 had to be changed to 4:15. Please help to confirm if she can come at this time or if it needs rescheduled.

## 2021-05-21 NOTE — TELEPHONE ENCOUNTER
----- Message from Rodger Peraza sent at 5/21/2021  3:29 PM EDT -----  Subject: Message to Provider    QUESTIONS  Information for Provider? Pt called back to confirm that 4:15pm is okay   for 6/8 appt.   ---------------------------------------------------------------------------  --------------  CALL BACK INFO  What is the best way for the office to contact you? OK to leave message on   voicemail  Preferred Call Back Phone Number? [phone]  ---------------------------------------------------------------------------  --------------  SCRIPT ANSWERS  Relationship to Patient?  Self

## 2021-06-04 DIAGNOSIS — I10 ESSENTIAL HYPERTENSION: ICD-10-CM

## 2021-06-04 NOTE — TELEPHONE ENCOUNTER
Refill Request     Last Seen: Last Seen Department: 3/9/2021  Last Seen by PCP: 3/9/2021    Last Written: 3/8/2021    Next Appointment:   Future Appointments   Date Time Provider Jones Berg   6/8/2021  4:15 PM MD TOBIAS Reeves  [place] [facility]       Future appointment scheduled      Requested Prescriptions     Pending Prescriptions Disp Refills    metoprolol succinate (TOPROL XL) 100 MG extended release tablet [Pharmacy Med Name: METOPROLOL SUCC  MG TAB] 90 tablet 0     Sig: TAKE ONE TABLET BY MOUTH ONCE NIGHTLY

## 2021-06-07 RX ORDER — METOPROLOL SUCCINATE 100 MG/1
TABLET, EXTENDED RELEASE ORAL
Qty: 90 TABLET | Refills: 0 | Status: SHIPPED | OUTPATIENT
Start: 2021-06-07 | End: 2021-09-03

## 2021-06-21 ENCOUNTER — OFFICE VISIT (OUTPATIENT)
Dept: FAMILY MEDICINE CLINIC | Age: 81
End: 2021-06-21
Payer: MEDICARE

## 2021-06-21 VITALS
HEIGHT: 62 IN | DIASTOLIC BLOOD PRESSURE: 70 MMHG | SYSTOLIC BLOOD PRESSURE: 130 MMHG | OXYGEN SATURATION: 95 % | BODY MASS INDEX: 25.03 KG/M2 | WEIGHT: 136 LBS | HEART RATE: 72 BPM

## 2021-06-21 DIAGNOSIS — R26.89 BALANCE PROBLEMS: ICD-10-CM
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
## 2021-06-21 DIAGNOSIS — Z23 NEED FOR TD VACCINE: ICD-10-CM

## 2021-06-21 DIAGNOSIS — R61 DIAPHORESIS: ICD-10-CM

## 2021-06-21 DIAGNOSIS — Z78.0 POSTMENOPAUSAL STATE: ICD-10-CM

## 2021-06-21 DIAGNOSIS — Z00.00 ROUTINE GENERAL MEDICAL EXAMINATION AT A HEALTH CARE FACILITY: Primary | ICD-10-CM

## 2021-06-21 DIAGNOSIS — E11.65 UNCONTROLLED TYPE 2 DIABETES MELLITUS WITH HYPERGLYCEMIA (HCC): ICD-10-CM

## 2021-06-21 LAB — HBA1C MFR BLD: 8.5 %

## 2021-06-21 PROCEDURE — G0439 PPPS, SUBSEQ VISIT: HCPCS | Performed by: FAMILY MEDICINE

## 2021-06-21 PROCEDURE — 83036 HEMOGLOBIN GLYCOSYLATED A1C: CPT | Performed by: FAMILY MEDICINE

## 2021-06-21 PROCEDURE — 3052F HG A1C>EQUAL 8.0%<EQUAL 9.0%: CPT | Performed by: FAMILY MEDICINE

## 2021-06-21 RX ORDER — PAROXETINE HYDROCHLORIDE 20 MG/1
20 TABLET, FILM COATED ORAL DAILY
Qty: 30 TABLET | Refills: 5 | Status: SHIPPED | OUTPATIENT
Start: 2021-06-21 | End: 2021-12-28

## 2021-06-21 ASSESSMENT — PATIENT HEALTH QUESTIONNAIRE - PHQ9
SUM OF ALL RESPONSES TO PHQ QUESTIONS 1-9: 2
1. LITTLE INTEREST OR PLEASURE IN DOING THINGS: 1
SUM OF ALL RESPONSES TO PHQ QUESTIONS 1-9: 2
SUM OF ALL RESPONSES TO PHQ QUESTIONS 1-9: 2
SUM OF ALL RESPONSES TO PHQ9 QUESTIONS 1 & 2: 2
2. FEELING DOWN, DEPRESSED OR HOPELESS: 1

## 2021-06-21 ASSESSMENT — LIFESTYLE VARIABLES
HOW OFTEN DO YOU HAVE A DRINK CONTAINING ALCOHOL: 2
HOW OFTEN DURING THE LAST YEAR HAVE YOU FAILED TO DO WHAT WAS NORMALLY EXPECTED FROM YOU BECAUSE OF DRINKING: 0
HOW OFTEN DURING THE LAST YEAR HAVE YOU NEEDED AN ALCOHOLIC DRINK FIRST THING IN THE MORNING TO GET YOURSELF GOING AFTER A NIGHT OF HEAVY DRINKING: 0
HAVE YOU OR SOMEONE ELSE BEEN INJURED AS A RESULT OF YOUR DRINKING: 0
HOW MANY STANDARD DRINKS CONTAINING ALCOHOL DO YOU HAVE ON A TYPICAL DAY: 0
HOW OFTEN DURING THE LAST YEAR HAVE YOU BEEN UNABLE TO REMEMBER WHAT HAPPENED THE NIGHT BEFORE BECAUSE YOU HAD BEEN DRINKING: 0
HAS A RELATIVE, FRIEND, DOCTOR, OR ANOTHER HEALTH PROFESSIONAL EXPRESSED CONCERN ABOUT YOUR DRINKING OR SUGGESTED YOU CUT DOWN: 0
AUDIT-C TOTAL SCORE: 2
HOW OFTEN DURING THE LAST YEAR HAVE YOU HAD A FEELING OF GUILT OR REMORSE AFTER DRINKING: 0
AUDIT TOTAL SCORE: 2
HOW OFTEN DO YOU HAVE SIX OR MORE DRINKS ON ONE OCCASION: 0
HOW OFTEN DURING THE LAST YEAR HAVE YOU FOUND THAT YOU WERE NOT ABLE TO STOP DRINKING ONCE YOU HAD STARTED: 0

## 2021-06-21 NOTE — PATIENT INSTRUCTIONS
Personalized Preventive Plan for Mateo Phillips - 6/21/2021  Medicare offers a range of preventive health benefits. Some of the tests and screenings are paid in full while other may be subject to a deductible, co-insurance, and/or copay. Some of these benefits include a comprehensive review of your medical history including lifestyle, illnesses that may run in your family, and various assessments and screenings as appropriate. After reviewing your medical record and screening and assessments performed today your provider may have ordered immunizations, labs, imaging, and/or referrals for you. A list of these orders (if applicable) as well as your Preventive Care list are included within your After Visit Summary for your review. Other Preventive Recommendations:    · A preventive eye exam performed by an eye specialist is recommended every 1-2 years to screen for glaucoma; cataracts, macular degeneration, and other eye disorders. · A preventive dental visit is recommended every 6 months. · Try to get at least 150 minutes of exercise per week or 10,000 steps per day on a pedometer . · Order or download the FREE \"Exercise & Physical Activity: Your Everyday Guide\" from [employer]. Call 6-499.576.1438 or search [employer] online. · You need 8279-8252 mg of calcium and 6389-1466 IU of vitamin D per day. It is possible to meet your calcium requirement with diet alone, but a vitamin D supplement is usually necessary to meet this goal.  · When exposed to the sun, use a sunscreen that protects against both UVA and UVB radiation with an SPF of 30 or greater. Reapply every 2 to 3 hours or after sweating, drying off with a towel, or swimming. · Always wear a seat belt when traveling in a car. Always wear a helmet when riding a bicycle or motorcycle.     Keeping Home a [facility]       As we get older, changes in balance, gait, strength, vision, hearing, and cognition make even the most youthful senior more prone to accidents. Falls are one of the leading health risks for older people. This increased risk of falling is related to:   Aging process (eg, decreased muscle strength, slowed reflexes)   Higher incidence of chronic health problems (eg, arthritis, diabetes) that may limit mobility, agility or sensory awareness   Side effects of medicine (eg, dizziness, blurred vision)especially medicines like prescription pain medicines and drugs used to treat mental health conditions   Depending on the brittleness of your bones, the consequences of a fall can be serious and long lasting. Home Life   Research by the Association of Aging [facility]) shows that some home accidents among older adults can be prevented by making simple lifestyle changes and basic modifications and repairs to the home environment. Here are some lifestyle changes that experts recommend:   Have your hearing and vision checked regularly. Be sure to wear prescription glasses that are right for you. Speak to your doctor or pharmacist about the possible side effects of your medicines. A number of medicines can cause dizziness. If you have problems with sleep, talk to your doctor. Limit your intake of alcohol. If necessary, use a cane or walker to help maintain your balance. Wear supportive, rubber-soled shoes, even at home. If you live in a region that gets wintry weather, you may want to put special cleats on your shoes to prevent you from slipping on the snow and ice. Exercise regularly to help maintain muscle tone, agility, and balance. Always hold the banister when going up or down stairs. Also, use  bars when getting in or out of the bath or shower, or using the toilet. To avoid dizziness, get up slowly from a lying down position. Sit up first, dangling your legs for a minute or two before rising to a standing position.    Overall Home Safety Check   According to the Consumer Product Safety Commision's \"Older Consumer Home Safety Checklist,\" it is important to check for potential hazards in each room. And remember, proper lighting is an essential factor in home safety. If you cannot see clearly, you are more likely to fall. Important questions to ask yourself include:   Are lamp, electric, extension, and telephone cords placed out of the flow of traffic and maintained in good condition? Have frayed cords been replaced? Are all small rugs and runners slip resistant? If not, you can secure them to the floor with a special double-sided carpet tape. Are smoke detectors properly locatedone on every floor of your home and one outside of every sleeping area? Are they in good working order? Are batteries replaced at least once a year? Do you have a well-maintained carbon monoxide detector outside every sleeping are in your home? Does your furniture layout leave plenty of space to maneuver between and around chairs, tables, beds, and sofas? Are hallways, stairs and passages between rooms well lit? Can you reach a lamp without getting out of bed? Are floor surfaces well maintained? Shag rugs, high-pile carpeting, tile floors, and polished wood floors can be particularly slippery. Stairs should always have handrails and be carpeted or fitted with a non-skid tread. Is your telephone easily reachable. Is the cord safely tucked away? Room by Room   According to the Association of Aging, bathrooms and edson are the two most potentially hazardous rooms in your home. In the Kitchen    Be sure your stove is in proper working order and always make sure burners and the oven are off before you go out or go to sleep. Keep pots on the back burners, turn handles away from the front of the stove, and keep stove clean and free of grease build-up. Kitchen ventilation systems and range exhausts should be working properly.     Keep flammable objects such as towels and pot holders away from the cooking area except when in use. Make sure kitchen curtains are tied back. Move cords and appliances away from the sink and hot surfaces. If extension cords are needed, install wiring guides so they do not hang over the sink, range, or working areas. Look for coffee pots, kettles and toaster ovens with automatic shut-offs. Keep a mop handy in the kitchen so you can wipe up spills instantly. You should also have a small fire extinguisher. Arrange your kitchen with frequently used items on lower shelves to avoid the need to stand on a stepstool to reach them. Make sure countertops are well-lit to avoid injuries while cutting and preparing food. In the Bathroom    Use a non-slip mat or decals in the tub and shower, since wet, soapy tile or porcelain surfaces are extremely slippery. Make sure bathroom rugs are non-skid or tape them firmly to the floor. Bathtubs should have at least one, preferably two, grab bars, firmly attached to structural supports in the wall. (Do not use built-in soap holders or glass shower doors as grab bars.)    Tub seats fitted with non-slip material on the legs allow you to wash sitting down. For people with limited mobility, bathtub transfer benches allow you to slide safely into the tub. Raised toilet seats and toilet safety rails are helpful for those with knee or hip problems. In the [facility]    Make sure you use a nightlight and that the area around your bed is clear of potential obstacles. Be careful with electric blankets and never go to sleep with a heating pad, which can cause serious burns even if on a low setting. Use fire-resistant mattress covers and pillows, and NEVER smoke in bed. Keep a phone next to the bed that is programmed to dial 911 at the push of a button. If you have a chronic condition, you may want to sign on with an automatic call-in service.  Typically the system includes a small pendant that connects directly to an emergency medical voice-response system. You should also make arrangements to stay in contact with someonefriend, neighbor, family memberon a regular schedule. Fire Prevention   According to the [employer]. (Smoke Alarms for Every) [facility], senior citizens are one of the two highest risk groups for death and serious injuries due to residential fires. When cooking, wear short-sleeved items, never a bulky long-sleeved robe. The [facility]'s Safety Checklist for Older Consumers emphasizes the importance of checking basements, garages, workshops and storage areas for fire hazards, such as volatile liquids, piles of old rags or clothing and overloaded circuits. Never smoke in bed or when lying down on a couch or recliner chair. Small portable electric or kerosene heaters are responsible for many home fires and should be used cautiously if at all. If you do use one, be sure to keep them away from flammable materials. In case of fire, make sure you have a pre-established emergency exit plan. Have a professional check your fireplace and other fuel-burning appliances yearly. [employer]   Baby boomers entering the shoemaker years will continue to see the development of new products to help older adults live safely and independently in spite of age-related changes. Making Life More Livable  , by Joselin Weems, lists over 1,000 products for \"living well in the mature years,\" such as bathing and mobility aids, household security devices, ergonomically designed knives and peelers, and faucet valves and knobs for temperature control. Medical supply stores and organizations are good sources of information about products that improve your quality of life and insure your safety.      Last Reviewed: November 2009 Patrice Garcia MD   Updated: 3/7/2011     ·

## 2021-06-21 NOTE — PROGRESS NOTES
Medicare Annual Wellness Visit  Name: Huey Arch Date: 2021   MRN: <[id number]> Sex: Female   Age: 80 y.o. Ethnicity: Non-/Non    : 1940 Race: Rocky Bell is here for Medicare AWV (discuss Paxil, check ears )    Screenings for behavioral, psychosocial and functional/safety risks, and cognitive dysfunction are all negative except as indicated below. These results, as well as other patient data from the [address] form, are documented in Flowsheets linked to this Encounter. Allergies   Allergen Reactions    Adhesive Tape      Band aid caused skin tears in past     Codeine Other (See Comments)     Felt strange    Iodides      Some issues with kidneys- told not to have per PCP     Sulfa Antibiotics      Pt can't remember reaction    Penicillins Rash       Prior to Visit Medications    Medication Sig Taking? Authorizing Provider   metoprolol succinate (TOPROL XL) 100 MG extended release tablet TAKE ONE TABLET BY MOUTH ONCE NIGHTLY Yes Dominique Lentz MD   losartan (COZAAR) 50 MG tablet TAKE ONE TABLET BY MOUTH DAILY Yes Dominique Lentz MD   montelukast (SINGULAIR) 10 MG tablet TAKE ONE TABLET BY MOUTH ONCE NIGHTLY Yes MARTINE Al CNP   fenofibrate (TRIGLIDE) 160 MG tablet TAKE ONE TABLET BY MOUTH DAILY Yes Dominique Lentz MD   triamterene-hydroCHLOROthiazide (MAXZIDE-25) 37.5-25 MG per tablet TAKE ONE TABLET BY MOUTH DAILY Yes Dominique Lentz MD   PARoxetine (PAXIL) 10 MG tablet Take 1 tablet by mouth daily Yes Dominique Lentz MD   atorvastatin (LIPITOR) 40 MG tablet TAKE ONE TABLET BY MOUTH DAILY Yes Dominique Lentz MD   Probiotic Product (PROBIOTIC DAILY PO) Take by mouth Yes Historical Provider, MD   aspirin 81 MG EC tablet Take 81 mg by mouth every evening.  Yes Historical Provider, MD   albuterol sulfate  (90 Base) MCG/ACT inhaler Inhale 2 puffs into the lungs every 6 hours as needed for Wheezing  Patient not taking: Reported on 6/21/2021  Jeanie Johnson, APRN - CNP       Past Medical History:   Diagnosis Date    Anal fissure 01/07/2013    Back pain     Chronic insomnia     Depression     Diabetic retinopathy of both eyes (HCC)     GERD (gastroesophageal reflux disease)     Dr. Peg Garay (GI)    Glossitis 03/16/2017    Hearing loss     Hypercholesteremia     Hypertension     Lumbar radiculopathy 03/03/2017    Patellofemoral stress syndrome of right knee 03/03/2017       Past Surgical History:   Procedure Laterality Date    APPENDECTOMY      CHOLECYSTECTOMY      CHOLECYSTECTOMY      COLONOSCOPY  1/18/2016    Normal    HYSTERECTOMY, TOTAL ABDOMINAL      KNEE SURGERY  left knee    OVARY REMOVAL      unknown which side    UPPER GASTROINTESTINAL ENDOSCOPY  1/18/2016    Normal       Family History   Problem Relation Age of Onset    Stroke Father     Heart Disease Brother     High Blood Pressure Other        CareTeam (Including outside providers/suppliers regularly involved in providing care):   Patient Care Team:  Tisha Adler MD as PCP - General (Family Medicine)  Tisha Adler MD as PCP - Everardo Kothari Provider    Wt Readings from Last 3 Encounters:   06/21/21 136 lb (61.7 kg)   03/09/21 138 lb 12.8 oz (63 kg)   08/24/20 135 lb (61.2 kg)     Vitals:    06/21/21 1624   BP: 130/70   Site: Right Upper Arm   Position: Sitting   Cuff Size: Small Adult   Pulse: 72   SpO2: 95%   Weight: 136 lb (61.7 kg)   Height: 5' 2\" (1.575 m)     Body mass index is 24.87 kg/m². Based upon direct observation of the patient, evaluation of cognition reveals recent and remote memory intact.     General Appearance: alert and oriented to person, place and time, well-developed and well-nourished, in no acute distress  Pulmonary/Chest: clear to auscultation bilaterally- no wheezes, rales or rhonchi, normal air movement, no respiratory distress  Cardiovascular: normal rate, normal S1 and S2, no gallops, intact distal pulses and no carotid bruits  Abdomen: soft, non-tender, non-distended, normal bowel sounds, no masses or organomegaly  Extremities: no cyanosis and no clubbing    Patient's complete Health Risk Assessment and screening values have been reviewed and are found in Flowsheets. The following problems were reviewed today and where indicated follow up appointments were made and/or referrals ordered. Positive Risk Factor Screenings with Interventions:     Fall Risk:  Timed Up and Go Test > 12 seconds? (Complete if either Fall Risk answers are Yes): (!) yes  2 or more falls in past year?: (!) yes (Ed visit & right knee injury)  Fall with injury in past year?: (!) yes  Fall Risk Interventions:    · Home safety tips provided  · Home exercises provided to promote strength and balance  · Physical therapy referral ordered for strength and balance training    Cognitive: Words recalled: 2 Words Recalled  Total Score Interpretation: Positive Mini-Cog  Cognitive Impairment Interventions:  · Patient declines any further evaluation/treatment for cognitive impairment       General Health and ACP:  General  In general, how would you say your health is?: Good  In the past 7 days, have you experienced any of the following?  New or Increased Pain, New or Increased Fatigue, Loneliness, Social Isolation, Stress or Anger?: (!) Stress  Do you get the social and emotional support that you need?: Yes  Do you have a Living Will?: Yes  Advance Directives     Power of  Living Will ACP-Advance Directive ACP-Power of     Not on File Not on File Not on File Not on File      General Health Risk Interventions:  · Stress: patient's comments regarding reasons for stress and/or anger: brother's health problems, he is currently in hospice  · No Living Will: ACP documents already completed- patient asked to provide copy to the office    Health Habits/Nutrition:  Health Habits/Nutrition  Do you exercise for at least 20 minutes 2-3 times per week?: (!) No  Have you lost any weight without trying in the past 3 months?: No  Do you eat only one meal per day?: No  Have you seen the dentist within the past year?: Yes  Body mass index: 24.87  Health Habits/Nutrition Interventions:  · Inadequate physical activity:  patient agrees to walk 20-30 min per day     Safety:  Safety  Do you have working smoke detectors?: Yes  Have all throw rugs been removed or fastened?: Yes  Do you have non-slip mats or surfaces in all bathtubs/showers?: (!) No  Do all of your stairways have a railing or banister?: Yes  Are your doorways, halls and stairs free of clutter?: Yes  Do you always fasten your seatbelt when you are in a car?: (!) No  Safety Interventions:  · Home safety tips provided     Personalized Preventive Plan   Current Health Maintenance Status  Immunization History   Administered Date(s) Administered    Influenza Vaccine, unspecified formulation 01/01/2012, 09/28/2015, 10/05/2016, 09/01/2017    Influenza Virus Vaccine 09/28/2015, 10/05/2016, 09/01/2017    Influenza, High Dose (Fluzone 65 yrs and older) 09/27/2016, 09/01/2018, 10/05/2018, 10/09/2019    Influenza, High-dose, Quadv, 65 yrs +, IM (Fluzone) 09/14/2020    Pneumococcal Conjugate 13-valent ([id number]) 09/28/2015    Pneumococcal Polysaccharide ([id number]) 03/29/2018    Td (Adult), 5 Lf Tetanus Toxoid, Pf (Tenivac, Decavac) 01/01/2010    Td, unspecified formulation 01/01/2010    Zoster Live (Zostavax) 01/01/2008    Zoster Recombinant (Shingrix) 09/17/2018, 03/06/2019        Health Maintenance   Topic Date Due    DTaP/Tdap/Td vaccine (1 - Tdap) 03/08/1959    Annual Wellness Visit (AWV)  06/19/2021    Lipid screen  03/09/2022    Potassium monitoring  03/09/2022    Creatinine monitoring  03/09/2022    DEXA (modify frequency per FRAX score)  Completed    Flu vaccine  Completed    Shingles Vaccine  Completed    Pneumococcal 65+ years Vaccine  Completed    COVID-19 Vaccine  Completed    Hepatitis C screen Completed    Hepatitis A vaccine  Aged Out    Hib vaccine  Aged Out    Meningococcal (ACWY) vaccine  Aged Out     Recommendations for [place] Due: see orders and patient instructions/AVS.    Results for POC orders placed in visit on 06/21/21   POCT glycosylated hemoglobin (Hb A1C)   Result Value Ref Range    Hemoglobin A1C 8.5 %       . Recommended screening schedule for the next 5-10 years is provided to the patient in written form: see Patient Lynne Cole was seen today for medicare awv. Diagnoses and all orders for this visit:    Routine general medical examination at a health care facility    Uncontrolled type 2 diabetes mellitus with hyperglycemia [facility])  The patient is asked to make an attempt to improve diet and exercise patterns to aid in medical management of this problem. She wishes to attempt to lower her glucose on her own before making any medication changes. Will follow up in 3 months.   -     POCT glycosylated hemoglobin (Hb A1C)    Need for Td vaccine  -     Advised to do this at her pharmacy for insurance reasons. Diaphoresis  -     PARoxetine (PAXIL) 20 MG tablet;  Take 1 tablet by mouth daily    Postmenopausal state  -     DEXA BONE DENSITY AXIAL SKELETON; Future    Balance problems  -     External Referral To Physical Therapy    Fall, subsequent encounter  -     External Referral To Physical Therapy

## 2021-07-13 ENCOUNTER — TELEPHONE (OUTPATIENT)
Dept: FAMILY MEDICINE CLINIC | Age: 81
End: 2021-07-13

## 2021-07-13 NOTE — TELEPHONE ENCOUNTER
Gave pt the number for scheduling and advised she could check with different krogers or the health department

## 2021-07-13 NOTE — TELEPHONE ENCOUNTER
----- Message from Christoph Hatch sent at 7/13/2021 11:44 AM EDT -----  Subject: Message to Provider    QUESTIONS  Information for Provider? patient is calling because she never received a   phone call about having the bone scan that Dr. Elver Ro recommended. Also,   she has not been able to get through to [facility] to schedule a tetanus   injection. Her insurance will not cover with our office. Is there a   different place she can get the tetanus injection that [facility] would cover?  ---------------------------------------------------------------------------  --------------  CALL BACK INFO  What is the best way for the office to contact you? OK to leave message on   voicemail  Preferred Call Back Phone Number? [phone]  ---------------------------------------------------------------------------  --------------  SCRIPT ANSWERS  Relationship to Patient?  Self

## 2021-07-15 ENCOUNTER — OFFICE VISIT (OUTPATIENT)
Dept: ORTHOPEDIC SURGERY | Age: 81
End: 2021-07-15
Payer: MEDICARE

## 2021-07-15 DIAGNOSIS — S16.1XXA STRAIN OF NECK MUSCLE, INITIAL ENCOUNTER: Primary | ICD-10-CM

## 2021-07-15 PROCEDURE — 99213 OFFICE O/P EST LOW 20 MIN: CPT | Performed by: PHYSICIAN ASSISTANT

## 2021-07-15 RX ORDER — TIZANIDINE 2 MG/1
2 TABLET ORAL 2 TIMES DAILY
Qty: 14 TABLET | Refills: 0 | Status: SHIPPED | OUTPATIENT
Start: 2021-07-15 | End: 2021-07-22

## 2021-07-15 NOTE — PROGRESS NOTES
Subjective    Chief Complaint   Patient presents with    Pain     Neck - no known injury. Been worsening for the past few weeks. Mary Ellen Manzo is a 80 y.o. female who presents today for evaluation of back/neck pain. she describes the pain as aching and sharp / stabbing and it is intermittent. she does not remember a specific injury that started the pain. with moderate activity makes the pain worse. rest  makes the pain better. The pain does not radiate to the upper extremities. she denies changes in bowel or bladder habits. she denies changes in vision or headaches. Pt has noticed left side neck pain for the last couple of weeks that has been getting more sore. Has pain with rotation of her neck to the left now. No n/t in the arms. No injury. 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 Pain located over trap muscle. No midline pain she's noticed. Patient's medications, allergies, past medical, surgical, social and family histories were reviewed and updated as appropriate. The pain assessment was noted & is as follows:  Pain Assessment  Location of Pain: Neck  Location Modifiers: Posterior  Severity of Pain: 6  Quality of Pain: Dull, Aching, Locking  Duration of Pain: Persistent  Frequency of Pain: Intermittent  Aggravating Factors: Bending, Stretching, Straightening  Limiting Behavior: Yes  Relieving Factors: Rest  Result of Injury: No  Work-Related Injury: No  Are there other pain locations you wish to document?: No    Constitutional exam:    Constitutional:  Pt well groomed, no acute distress, well developed, no obvious deformities  There were no vitals filed for this visit.  -Oriented to person, place, and time  -mood and affect are appropriate    Physical Exam  -On physical exam, she  is well-developed, well-nourished   -oriented to person, place, and time. her  mood and affect are appropriate.   -her gait is Normal.    -Motor strength is 5/5 throughout both lower extremities.     -cervical spine range of motion is limited in flexion and extension secondary to pain. There is  pain with lateral flexion of cervical spine. There is  pain with rotation of cervical spine.    -she is not Tender to palpation over the cervical spine vertebrae. Mild spasm in the trap muscle left side. Full ROM of the arm. No shoulder pain. Neurological:   -Deep tendon reflexes at elbow and wrist are symmetric bilaterally.    -Blurry, double visionis not noted. -NVI to upper extremities bilaterally. Skin:  No abrasions, lesions, cellulitic changes, obvious deformities noted         Xray: 2 view (AP/Lat) of cervical spine spine show:   no fracture or dislocation noted, shows DJD changes, likely chronic    Assessment:  strain of cervical spine and trapezius strain    Plan: rest the injured area as much as practical, apply ice packs, apply heat (warned not to sleep on heating pad), prescription for muscle relaxant  Gentle stretching  OTC NSAIDs  F/U with spine center 1-2 weeks. No orders of the defined types were placed in this encounter.

## 2021-07-22 ENCOUNTER — HOSPITAL ENCOUNTER (OUTPATIENT)
Dept: WOMENS IMAGING | Age: 81
Discharge: HOME | End: 2021-07-22
Payer: MEDICARE

## 2021-07-22 DIAGNOSIS — Z78.0 POSTMENOPAUSAL STATE: ICD-10-CM

## 2021-07-22 PROCEDURE — 77080 DXA BONE DENSITY AXIAL: CPT

## 2021-07-28 ENCOUNTER — OFFICE VISIT (OUTPATIENT)
Dept: ORTHOPEDIC SURGERY | Age: 81
End: 2021-07-28
Payer: MEDICARE

## 2021-07-28 VITALS — HEIGHT: 62 IN | BODY MASS INDEX: 25.03 KG/M2 | WEIGHT: 136 LBS

## 2021-07-28 DIAGNOSIS — M47.812 SPONDYLOSIS WITHOUT MYELOPATHY OR RADICULOPATHY, CERVICAL REGION: Primary | ICD-10-CM

## 2021-07-28 PROCEDURE — 99214 OFFICE O/P EST MOD 30 MIN: CPT | Performed by: PHYSICIAN ASSISTANT

## 2021-07-28 NOTE — PROGRESS NOTES
New Patient: CERVICAL SPINE    Referring Provider:  Shelbie Mejía from [facility]    CHIEF COMPLAINT:    Chief Complaint   Patient presents with    Neck Pain     Patient was seen in [facility]. Patient states that her pain began about 1 month ago. Complains of left sided neck pain. She has been taking zanaflex which is helping. States that her pain is better than when she was seen in [facility]. HISTORY OF PRESENT ILLNESS:   Ms. Martinez Bee is a pleasant 80 y.o. old female here for consultation regarding her neck pain. She states the pain began insidiously about 1 month ago. Her pain has steadily improved some since then. She rates her neck pain 5/10 and shoulder and arm pain 0/10. She describes the pain as aching and sharp. She notes she initially would experience a sharp pain along the right side of her neck with left and right rotation of her cervical spine. She feels her range of motion has improved some since starting muscle relaxer. She notes intermittent pain and tingling in her left thumb, but otherwise denies upper extremity radicular symptoms. She denies weakness of her arms or legs. She notes occasional balance disturbance but overall feels stable with gait. Her chief complaint is decreased range of motion of her cervical spine. She denies lower extremity symptoms, gait abnormality, saddle numbness and bowel or bladder dysfunction. The pain occasionally interferes with her sleep.     Current/Past Treatment:   · Physical Therapy: No  · Chiropractic:  No  · Injection:  No   · Medications: Zanaflex      Past Medical History:   Past Medical History:   Diagnosis Date    Anal fissure 01/07/2013    Back pain     Chronic insomnia     Depression     Diabetic retinopathy of both eyes (HCC)     GERD (gastroesophageal reflux disease)     Dr. Arturo Fernandez (GI)    Glossitis 03/16/2017    Hearing loss     Hypercholesteremia     Hypertension     Lumbar radiculopathy 03/03/2017    Patellofemoral stress syndrome of right knee 03/03/2017        Past Surgical History:     Past Surgical History:   Procedure Laterality Date    APPENDECTOMY      CHOLECYSTECTOMY      CHOLECYSTECTOMY      COLONOSCOPY  1/18/2016    Normal    HYSTERECTOMY, TOTAL ABDOMINAL      KNEE SURGERY  left knee    OVARY REMOVAL      unknown which side    UPPER GASTROINTESTINAL ENDOSCOPY  1/18/2016    Normal       Current Medications:     Current Outpatient Medications:     PARoxetine (PAXIL) 20 MG tablet, Take 1 tablet by mouth daily, Disp: 30 tablet, Rfl: 5    metoprolol succinate (TOPROL XL) 100 MG extended release tablet, TAKE ONE TABLET BY MOUTH ONCE NIGHTLY, Disp: 90 tablet, Rfl: 0    losartan (COZAAR) 50 MG tablet, TAKE ONE TABLET BY MOUTH DAILY, Disp: 90 tablet, Rfl: 1    montelukast (SINGULAIR) 10 MG tablet, TAKE ONE TABLET BY MOUTH ONCE NIGHTLY, Disp: 90 tablet, Rfl: 1    fenofibrate (TRIGLIDE) 160 MG tablet, TAKE ONE TABLET BY MOUTH DAILY, Disp: 90 tablet, Rfl: 3    triamterene-hydroCHLOROthiazide (MAXZIDE-25) 37.5-25 MG per tablet, TAKE ONE TABLET BY MOUTH DAILY, Disp: 90 tablet, Rfl: 1    albuterol sulfate  (90 Base) MCG/ACT inhaler, Inhale 2 puffs into the lungs every 6 hours as needed for Wheezing, Disp: 1 Inhaler, Rfl: 2    atorvastatin (LIPITOR) 40 MG tablet, TAKE ONE TABLET BY MOUTH DAILY, Disp: 90 tablet, Rfl: 3    Probiotic Product (PROBIOTIC DAILY PO), Take by mouth, Disp: , Rfl:     aspirin 81 MG EC tablet, Take 81 mg by mouth every evening., Disp: , Rfl:     Allergies:  Adhesive tape, Codeine, Iodides, Sulfa antibiotics, and Penicillins    Social History:    reports that she has never smoked. She has never used smokeless tobacco. She reports current alcohol use. She reports that she does not use drugs.     Family History:   Family History   Problem Relation Age of Onset    Stroke Father     Heart Disease Brother     High Blood Pressure Other        REVIEW OF SYSTEMS: Full ROS noted & scanned CONSTITUTIONAL: Denies unexplained weight loss, fevers, chills or fatigue  NEUROLOGICAL: Denies unsteady gait or progressive weakness  MUSCULOSKELETAL: Denies joint swelling or redness  PSYCHOLOGICAL: Denies anxiety, depression   SKIN: Denies skin changes, delayed healing, rash, itching   HEMATOLOGIC: Denies easy bleeding or bruising  ENDOCRINE: Denies excessive thirst, urination, heat/cold  RESPIRATORY: Denies current dyspnea, cough  GI: Denies nausea, vomiting, diarrhea   : Denies bowel or bladder issues       PHYSICAL EXAM:    Vitals: Height 5' 2\" (1.575 m), weight 136 lb (61.7 kg). GENERAL EXAM:  · General Apparence: Patient is adequately groomed with no evidence of malnutrition. · Orientation: The patient is oriented to time, place and person. · Mood & Affect:The patient's mood and affect are appropriate   · Vascular: Examination reveals no swelling tenderness in upper or lower extremities. Good capillary refill  · Lymphatic: The lymphatic examination bilaterally reveals all areas to be without enlargement or induration  · Sensation: Sensation is intact without deficit  · Coordination/Balance: Good coordination. Tandem walking normal.     CERVICAL EXAMINATION:  · Inspection: Local inspection shows no step-off or bruising. Cervical alignment is normal.     · Palpation: No evidence of tenderness at the midline, and trapezius. Paraspinal tenderness is present. There is no step-off or paraspinal spasm. · Range of Motion: Cervical flexion, extension, and rotation are mildly reduced with pain. · Strength: 5/5 bilateral upper extremities   · Special Tests:    ·  Spurling's negative & Baker's negative bilaterally. ·  Cubital and Carpal tunnel Tinel's negative bilaterally. · Skin:There are no rashes, ulcerations or lesions in right & left upper extremities. · Reflexes: Bilaterally triceps, biceps and brachioradialis are 2+. Clonus absent bilaterally at the feet.    · Gait & station: normal, patient ambulates without assistance       · Additional Examinations:       · RIGHT UPPER EXTREMITY:  Inspection/examination of the right upper extremity does not show any tenderness, deformity or injury. Range of motion is unremarkable. There is no gross instability. There are no rashes, ulcerations or lesions. Strength and tone are normal.  · LEFT UPPER EXTREMITY: Inspection/examination of the left upper extremity does not show any tenderness, deformity or injury. Range of motion is unremarkable. There is no gross instability. There are no rashes, ulcerations or lesions. Strength and tone are normal.  RIGHT LOWER EXTREMITY: Inspection/examination of the right lower extremity does not show any tenderness, deformity or injury. Range of motion is unremarkable. There is no gross instability. There are no rashes, ulcerations or lesions. Strength and tone are normal.  LEFT LOWER EXTREMITY:  Inspection/examination of the left lower extremity does not show any tenderness, deformity or injury. Range of motion is unremarkable. There is no gross instability. There are no rashes, ulcerations or lesions. Strength and tone are normal.    Diagnostic Testing:  I reviewed AP and lateral xray images of her cervical spine from 7/15/21. They show loss of normal cervical lordosis with multilevel spondylosis and facet arthropathy, most notable C3-C6. Impression:   Cervical spondylosis without radiculopathy    Plan:    We discussed treatment options including observation, physical therapy, epidural injections or additional imaging. She wishes to proceed with physical therapy. She will also consider a cervical traction collar. She will call for a cervical MRI if her symptoms persist or worsen, or if she develops any new neurologic symptoms. Old records were reviewed.     Brigitte Bower PA-C

## 2021-08-05 ENCOUNTER — HOSPITAL ENCOUNTER (OUTPATIENT)
Dept: PHYSICAL THERAPY | Age: 81
Setting detail: THERAPIES SERIES
Discharge: HOME | End: 2021-08-05
Payer: MEDICARE

## 2021-08-05 PROCEDURE — 97140 MANUAL THERAPY 1/> REGIONS: CPT | Performed by: PHYSICAL THERAPIST

## 2021-08-05 PROCEDURE — 97161 PT EVAL LOW COMPLEX 20 MIN: CPT | Performed by: PHYSICAL THERAPIST

## 2021-08-05 PROCEDURE — 97110 THERAPEUTIC EXERCISES: CPT | Performed by: PHYSICAL THERAPIST

## 2021-08-05 NOTE — PLAN OF CARE
[facility], [facility]  [address] [place] [address]  Phone: [phone]  Fax [phone]   Physical Therapy Certification    Dear Referring Practitioner: Salo Meyer,    We had the pleasure of evaluating the following patient for physical therapy services at [address] [place] [address]. A summary of our findings can be found in the initial assessment below. This includes our plan of care. If you have any questions or concerns regarding these findings, please do not hesitate to contact me at the office phone number checked above. Thank you for the referral.       Physician Signature:_______________________________Date:__________________  By signing above (or electronic signature), therapists plan is approved by physician    Patient: Davide Rasheed   : 1940   MRN: [id number]  Referring Physician: Referring Practitioner: Salo Meyer      Evaluation Date: 2021      Medical Diagnosis Information:  Diagnosis: Spondylosis without myelpathy or radiculopathy (M47.812)   Treatment Diagnosis: Neck pain (M54.2)                                         Insurance information: PT Insurance Information: [place]/    Precautions/ Contra-indications: None    C-SSRS Triggered by Intake questionnaire (Past 2 wk assessment):   [x] No, Questionnaire did not trigger screening.   [] Yes, Patient intake triggered further evaluation      [] C-SSRS Screening completed  [] PCP notified via Plan of Care  [] Emergency services notified     Latex Allergy:  [x]NO      []YES  Preferred Language for Healthcare:   [x]English       []other:    SUBJECTIVE: Patient stated complaint:Patient reports she has been having pain in her neck for about a month. Reports that she was playing golf and when she leaned over to putt, she felt a tingling sensation in the back of her neck on the left side and radiates up to the base of her head.  She also gets pain in the same area. Has recently started getting pain on the right side as well, but not nearly as intense. Reports that she does have pain in her right thumb, but that is due to arthritis and has been present for years. Reports that she hears some clicking and feels crunching in her neck. Reports that she has gotten mild headaches since she started. Denies dizziness, vision changes, lightheadedness. Relevant Medical History: hx of DM, OA, anxiety  Functional Disability Index: NDI 7/50 (14%)    Height: 5'4 Weight :131  Pain Scale: 0-7/10  Easing factors: rest  Provocative factors: turning head (L > R),  Looking down    Type: []Constant   []Intermittent  []Radiating []Localized []other:     Numbness/Tingling: present at base of neck on the left side and up the cervical spine    Occupation/School:retired     Living Status/Prior Level of Function: Independent with ADLs and IADLs, lives with spouse in one level house, enjoys golfing    OBJECTIVE:     CERV ROM     Cervical Flexion 50    Cervical Extension 30    Cervical SB 20 20   Cervical rotation 40 40        ROM Left Right   Shoulder Flex 145 145   Shoulder Abd 150 150   Shoulder ER [facility]   Shoulder IR [facility]             Strength  Left Right   Shoulder Flex 4/5 4/5   Shoulder Scap 4/5 4/5   Shoulder ER 4/5 4/5   Shoulder IR 4/5 4/5             Reflexes Left Right   C5-6 Biceps     C5-6 Brachioradialis     C7-8 Triceps       Reflexes/Sensation:    [x]Dermatomes/Myotomes intact    []Reflexes equal and normal bilaterally   []Other:    Joint mobility:    []Normal    [x]Hypo throughout cervical spine with gentle mobilizations   []Hyper    Palpation: TTP bilateral upper traps, suboccipitals L > R    Functional Mobility/Transfers: mod I    Posture: forward head, rounded shoulders, scapular winging  Bandages/Dressings/Incisions: NA    Gait: (include devices/WB status): WNL     Orthopedic Special Tests: spurlings -                       [x] Patient history, allergies, meds reviewed. Medical chart reviewed. See intake form. Review Of Systems (ROS):  [x]Performed Review of systems (Integumentary, CardioPulmonary, Neurological) by intake and observation. Intake form has been scanned into medical record. Patient has been instructed to contact their primary care physician regarding ROS issues if not already being addressed at this time. Co-morbidities/Complexities (which will affect course of rehabilitation):   []None           Arthritic conditions   []Rheumatoid arthritis (M05.9)  [x]Osteoarthritis (M19.91)   Cardiovascular conditions   []Hypertension (I10)  []Hyperlipidemia (E78.5)  []Angina pectoris (I20)  []Atherosclerosis (I70)  []CVA Musculoskeletal conditions   []Disc pathology   []Congenital spine pathologies   []Prior surgical intervention  []Osteoporosis (M81.8)  []Osteopenia (M85.8)   Endocrine conditions   []Hypothyroid (E03.9)  []Hyperthyroid Gastrointestinal conditions   []Constipation (Q33.33)   Metabolic conditions   []Morbid obesity (E66.01)  [x]Diabetes type 1(E10.65) or 2 (E11.65)   []Neuropathy (G60.9)     Pulmonary conditions   []Asthma (J45)  []Coughing   []COPD (J44.9)   Psychological Disorders  [x]Anxiety (F41.9)  []Depression (F32.9)   []Other:   []Other:          Barriers to/and or personal factors that will affect rehab potential:              [x]Age  []Sex   []Smoker              []Motivation/Lack of Motivation                        [x]Co-Morbidities              []Cognitive Function, education/learning barriers              []Environmental, home barriers              []profession/work barriers  []past PT/medical experience  []other:  Justification:     Falls Risk Assessment (30 days):   [x] Falls Risk assessed and no intervention required.   [] Falls Risk assessed and Patient requires intervention due to being higher risk   TUG score (>12s at risk):     [] Falls education provided, including         ASSESSMENT: Patient demonstrates decreased mobility in her cervical spine as well as weakness in deep neck flexors and scapular musculature. Would benefit from skilled PT to address limitations. Functional Impairments:     [x]Noted cervical/thoracic/GHJ joint hypomobility   []Noted cervical/thoracic/GHJ joint hypermobility   [x]Decreased cervical/UE functional ROM   [x]Noted Headache pain aggravated by neck movements with/without dizziness   []Abnormal reflexes/sensation/myotomal/dermatomal deficits   [x]Decreased DCF control or ability to hold head up   [x]Decreased RC/scapular/core strength and neuromuscular control    [x]Decreased UE functional strength   []other:      Functional Activity Limitations (from functional questionnaire and intake)   []Reduced ability to tolerate prolonged functional positions   []Reduced ability or difficulty with changes of positions or transfers between positions   [x]Reduced ability to maintain good posture and demonstrate good body mechanics with sitting, bending, and lifting   [x] Reduced ability or tolerance with driving and/or computer work   []Reduced ability to perform lifting, reaching, carrying tasks   [x]Reduced ability to concentrate   [x]Reduced ability to sleep    []Reduced ability to tolerate any impact through UE or spine   []Reduced ability to ambulate prolonged functional periods/distances   []other:    Participation Restrictions   []Reduced participation in self care activities   [x]Reduced participation in home management activities   []Reduced participation in work activities   []Reduced participation in social activities. [x]Reduced participation in sport/recreational activities.     Classification/Subgrouping:   [x]signs/symptoms consistent with neck pain with mobility deficits     []signs/symptoms consistent with neck pain with movement coordinated impairments    []signs/symptoms consistent with neck pain with radiating pain    [x]signs/symptoms consistent with neck pain with headaches (cervicogenic) []Signs/symptoms consistent with nerve root involvement including myotome & dermatome dysfunction   []sign/symptoms consistent with facet dysfunction of cervical and thoracic spine    []signs/symptoms consistent suggesting central cord compression/UMN syndromes   []signs/symptoms consistent with discogenic cervical pain   []signs/symptoms consistent with rib dysfunction   [x]signs/symptoms consistent with postural dysfunction   []signs/symptoms consistent with shoulder pathology    []signs/symptoms consistent with post-surgical status including decreased ROM, strength and function.    []signs/symptoms consistent with pathology which may benefit from Dry Needling   []signs/symptoms which may limit the use of advanced manual therapy techniques: (Elevated CV risk profile, recent trauma, intolerance to end range positions, prior TIA, visual issues, UE neurological compromise )     Prognosis/Rehab Potential:      []Excellent   []Good    [x]Fair   []Poor    Tolerance of evaluation/treatment:    []Excellent   [x]Good    []Fair   []Poor    Physical Therapy Evaluation Complexity Justification  [x] A history of present problem with:  [] no personal factors and/or comorbidities that impact the plan of care;  []1-2 personal factors and/or comorbidities that impact the plan of care  [x]3 personal factors and/or comorbidities that impact the plan of care  [x] An examination of body systems using standardized tests and measures addressing any of the following: body structures and functions (impairments), activity limitations, and/or participation restrictions;:  [x] a total of 1-2 or more elements   [] a total of 3 or more elements   [] a total of 4 or more elements   [x] A clinical presentation with:  [x] stable and/or uncomplicated characteristics   [] evolving clinical presentation with changing characteristics  [] unstable and unpredictable characteristics;   [x] Clinical decision making of [x] low, [] moderate, [] high complexity using standardized patient assessment instrument and/or measurable assessment of functional outcome. [x] EVAL (LOW) 96904 (typically 20 minutes face-to-face)  [] EVAL (MOD) 46002 (typically 30 minutes face-to-face)  [] EVAL (HIGH) 25802 (typically 45 minutes face-to-face)  [] RE-EVAL     PLAN:   Frequency/Duration:  1-2 days per week for 8 Weeks:  Interventions:  [x]  Therapeutic exercise including: strength training, ROM, for cervical spine,scapula, core and Upper extremity, including postural re-education. [x]  NMR activation and proprioception for Deep cervical flexors, periscapular and RC muscles and Core, including postural re-education. [x]  Manual therapy as indicated for C/T spine, ribs, Soft tissue to include: Dry Needling/IASTM, STM, PROM, Gr I-IV mobilizations, manipulation. [x] Modalities as needed that may include: thermal agents, E-stim, Biofeedback, US, iontophoresis as indicated  [x] Patient education on joint protection, postural re-education, activity modification, progression of HEP. HEP instruction:   Access Code: Eddye Megn: [email or web address]  Date: 08/05/2021  Prepared by: Hyacinth Ren    GOALS:  Patient stated goal: \"To get rid of the pain. \"  [] Progressing: [] Met: [] Not Met: [] Adjusted    Therapist goals for Patient:   Short Term Goals: To be achieved in: 2 weeks  1. Independent in HEP and progression per patient tolerance, in order to prevent re-injury. [] Progressing: [] Met: [] Not Met: [] Adjusted  2. Patient will have a decrease in pain to facilitate improvement in movement, function, and ADLs as indicated by Functional Deficits. [] Progressing: [] Met: [] Not Met: [] Adjusted    Long Term Goals: To be achieved in: 8 weeks  1. Disability index score of 6% or less for the NDI to assist with reaching prior level of function. [] Progressing: [] Met: [] Not Met: [] Adjusted  2.  Patient will demonstrate increased AROM of bilateral cervical rotation to 50 degrees to allow for proper joint functioning as indicated by patients Functional Deficits. [] Progressing: [] Met: [] Not Met: [] Adjusted  3. Patient will demonstrate an increase in postural awareness and control and activation of  Deep cervical stabilizers to allow for proper functional mobility as indicated by patients Functional Deficits. [] Progressing: [] Met: [] Not Met: [] Adjusted   4. Patient will be able to drive without increased symptoms or restriction. [] Progressing: [] Met: [] Not Met: [] Adjusted  5.  Patient will be able to play golf without increased symptoms or restrictions. (patient specific functional goal) [] Progressing: [] Met: [] Not Met: [] Adjusted      Electronically signed by:  Milind Maki PT, DPT [id number]

## 2021-08-05 NOTE — FLOWSHEET NOTE
scapular retraction without elevation, heat,                                        Manual Intervention           STM bilateral upper traps, cervical paraspinals, suboccipitals 10'  gentle   Seated cervical rotation MWM 1 5x ea                                   NMR re-education          T-spine Ext- foam roll      Chin tucks                         Traction                                     Therapeutic Exercise and NMR EXR  [x] (80661) Provided verbal/tactile cueing for activities related to strengthening, flexibility, endurance, ROM  for improvements in cervical, postural, scapular, scapulothoracic and UE control with self care, reaching, carrying, lifting, house/yardwork, driving/computer work.    [] (99030) Provided verbal/tactile cueing for activities related to improving balance, coordination, kinesthetic sense, posture, motor skill, proprioception  to assist with cervical, scapular, scapulothoracic and UE control with self care, reaching, carrying, lifting, house/yardwork, driving/computer work. Therapeutic Activities:    [] (83015 or 10684) Provided verbal/tactile cueing for activities related to improving balance, coordination, kinesthetic sense, posture, motor skill, proprioception and motor activation to allow for proper function of cervical, scapular, scapulothoracic and UE control with self care, carrying, lifting, driving/computer work.      Home Exercise Program:    [x] (07051) Reviewed/Progressed HEP activities related to strengthening, flexibility, endurance, ROM of cervical, scapular, scapulothoracic and UE control with self care, reaching, carrying, lifting, house/yardwork, driving/computer work  [] (26883) Reviewed/Progressed HEP activities related to improving balance, coordination, kinesthetic sense, posture, motor skill, proprioception of cervical, scapular, scapulothoracic and UE control with self care, reaching, carrying, lifting, house/yardwork, driving/computer work      Manual Treatments:  PROM / STM / Oscillations-Mobs:  G-I, II, III, IV (PA's, Inf., Post.)  [x] (20102) Provided manual therapy to mobilize soft tissue/joints of cervical/CT, scapular GHJ and UE for the purpose of decreasing headache, modulating pain, promoting relaxation,  increasing ROM, reducing/eliminating soft tissue swelling/inflammation/restriction, improving soft tissue extensibility and allowing for proper ROM for normal function with self care, reaching, carrying, lifting, house/yardwork, driving/computer work    Modalities:  HP x 10' to left upper trap while seated  Charges  Timed Code Treatment Minutes: 23'   Total Treatment Minutes: 50'     Medicare total (add KX at $2000)       [x] EVAL (LOW) 08491   [] EVAL (MOD) 53724   [] EVAL (HIGH) 59657   [] RE-EVAL     [x] FB(11373) x   1  [] IONTO  [] NMR (81967) x     [] VASO  [x] Manual (88420) x 1     [] Other:  [] TA x      [] Mech Traction (75204)  [] ES(attended) (15200)      [] ES (un) (82570):     GOALS:  Patient stated goal: \"To get rid of the pain. \"  [] Progressing: [] Met: [] Not Met: [] Adjusted    Therapist goals for Patient:   Short Term Goals: To be achieved in: 2 weeks  1. Independent in HEP and progression per patient tolerance, in order to prevent re-injury. [] Progressing: [] Met: [] Not Met: [] Adjusted  2. Patient will have a decrease in pain to facilitate improvement in movement, function, and ADLs as indicated by Functional Deficits. [] Progressing: [] Met: [] Not Met: [] Adjusted    Long Term Goals: To be achieved in: 8 weeks  1. Disability index score of 6% or less for the NDI to assist with reaching prior level of function. [] Progressing: [] Met: [] Not Met: [] Adjusted  2. Patient will demonstrate increased AROM of bilateral cervical rotation to 50 degrees to allow for proper joint functioning as indicated by patients Functional Deficits. [] Progressing: [] Met: [] Not Met: [] Adjusted  3.  Patient will demonstrate an increase in postural awareness and control and activation of  Deep cervical stabilizers to allow for proper functional mobility as indicated by patients Functional Deficits. [] Progressing: [] Met: [] Not Met: [] Adjusted   4. Patient will be able to drive without increased symptoms or restriction. [] Progressing: [] Met: [] Not Met: [] Adjusted  5. Patient will be able to play golf without increased symptoms or restrictions. (patient specific functional goal) [] Progressing: [] Met: [] Not Met: [] Adjusted  (cut and paste from eval)    Overall Progression Towards Functional goals/ Treatment Progress Update:  [] Patient is progressing as expected towards functional goals listed. [] Progression is slowed due to complexities/Impairments listed. [] Progression has been slowed due to co-morbidities. [x] Plan just implemented, too soon to assess goals progression <30days   [] Goals require adjustment due to lack of progress  [] Patient is not progressing as expected and requires additional follow up with physician  [] Other    Prognosis for POC: [x] Good [] Fair  [] Poor      Patient requires continued skilled intervention: [x] Yes  [] No      ASSESSMENT:  See eval    Treatment/Activity Tolerance:  [x] Patient tolerated treatment well [] Patient limited by fatique  [] Patient limited by pain  [] Patient limited by other medical complications  [] Other:     Prognosis: [] Good [x] Fair  [] Poor    Patient Requires Follow-up: [x] Yes  [] No    PLAN: See eval  [] Continue per plan of care [] Alter current plan (see comments above)  [x] Plan of care initiated [] Hold pending MD visit [] Discharge      Electronically signed by:  Eneida Swenson, PT, DPT [id number]     Note: If patient does not return for scheduled/ recommended follow up visits, this note will serve as a discharge from care along with most recent update on progress.

## 2021-08-12 ENCOUNTER — HOSPITAL ENCOUNTER (OUTPATIENT)
Dept: PHYSICAL THERAPY | Age: 81
Setting detail: THERAPIES SERIES
Discharge: HOME | End: 2021-08-12
Payer: MEDICARE

## 2021-08-12 PROCEDURE — 97110 THERAPEUTIC EXERCISES: CPT | Performed by: PHYSICAL THERAPIST

## 2021-08-12 PROCEDURE — 97140 MANUAL THERAPY 1/> REGIONS: CPT | Performed by: PHYSICAL THERAPIST

## 2021-08-12 NOTE — FLOWSHEET NOTE
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)  · Treatment Diagnosis: Neck pain (V11.2)  Insurance/Certification information:  PT Insurance Information: [employer]/  Physician Information:  Referring Practitioner: Adina Merrill  Has the plan of care been signed (Y/N):        [x]  Yes  []  No     Date of Patient follow up with Physician: not scheduled    Is this a Progress Report:     []  Yes  [x]  No        If Yes:  Date Range for reporting period:  Beginnin2021  Ending    Progress report will be due (10 Rx or 30 days whichever is less):  3/5/6738      Recertification will be due (POC Duration  / 90 days whichever is less):         Visit # Insurance Allowable Auth Required   In-person 2  []  Yes []  No    Telehealth   []  Yes []  No    Total        Functional Scale: NDI 7/55 (14%)    Date assessed:  2021   Therapy Diagnosis/Practice Pattern: F      Number of Comorbidities:  []0     []1-2    [x]3+     Latex Allergy:  [x]NO      []YES  Preferred Language for Healthcare:   [x]English       []other:    Pain level:  0-4/10     SUBJECTIVE:  Patient reports her neck is doing a little better. Not having as much sharp pains. However, has not been doing her exercises much as her brother just passed away so she has been pretty busy.      OBJECTIVE:    Observation:    Test measurements:  AROM right rotation 48 deg, left rotation 40 deg    RESTRICTIONS/PRECAUTIONS: none    Exercises/Interventions:   Therapeutic Ex        Sets/sec Reps Notes   Supine chin tucks 5\" 15x    Serratus punches 3\" 20x Needs cueing   Upper trap stretch Reviewed/Progressed HEP activities related to improving balance, coordination, kinesthetic sense, posture, motor skill, proprioception of cervical, scapular, scapulothoracic and UE control with self care, reaching, carrying, lifting, house/yardwork, driving/computer work      Manual Treatments:  PROM / STM / Oscillations-Mobs:  G-I, II, III, IV (PA's, Inf., Post.)  [x] (48828) Provided manual therapy to mobilize soft tissue/joints of cervical/CT, scapular GHJ and UE for the purpose of decreasing headache, modulating pain, promoting relaxation,  increasing ROM, reducing/eliminating soft tissue swelling/inflammation/restriction, improving soft tissue extensibility and allowing for proper ROM for normal function with self care, reaching, carrying, lifting, house/yardwork, driving/computer work    Modalities:  HP x 10' to left upper trap while seated  Charges  Timed Code Treatment Minutes: 40'   Total Treatment Minutes: 48'     Medicare total (add KX at $2000)       [] EVAL (LOW) 65292   [] EVAL (MOD) 42840   [] EVAL (HIGH) 67399   [] RE-EVAL     [x] HE(20142) x   2  [] IONTO  [] NMR (18351) x     [] VASO  [x] Manual (94171) x 1     [] Other:  [] TA x      [] Mech Traction (92295)  [] ES(attended) (08824)      [] ES (un) (80442):     GOALS:  Patient stated goal: \"To get rid of the pain. \"  [] Progressing: [] Met: [] Not Met: [] Adjusted    Therapist goals for Patient:   Short Term Goals: To be achieved in: 2 weeks  1. Independent in HEP and progression per patient tolerance, in order to prevent re-injury. [] Progressing: [] Met: [] Not Met: [] Adjusted  2. Patient will have a decrease in pain to facilitate improvement in movement, function, and ADLs as indicated by Functional Deficits. [x] Progressing: [] Met: [] Not Met: [] Adjusted    Long Term Goals: To be achieved in: 8 weeks  1. Disability index score of 6% or less for the NDI to assist with reaching prior level of function.    [] Progressing: [] Met: [] Not Met: [] Adjusted  2. Patient will demonstrate increased AROM of bilateral cervical rotation to 50 degrees to allow for proper joint functioning as indicated by patients Functional Deficits. [] Progressing: [] Met: [] Not Met: [] Adjusted  3. Patient will demonstrate an increase in postural awareness and control and activation of  Deep cervical stabilizers to allow for proper functional mobility as indicated by patients Functional Deficits. [] Progressing: [] Met: [] Not Met: [] Adjusted   4. Patient will be able to drive without increased symptoms or restriction. [] Progressing: [] Met: [] Not Met: [] Adjusted  5. Patient will be able to play golf without increased symptoms or restrictions. (patient specific functional goal) [] Progressing: [] Met: [] Not Met: [] Adjusted  (cut and paste from eval)    Overall Progression Towards Functional goals/ Treatment Progress Update:  [] Patient is progressing as expected towards functional goals listed. [] Progression is slowed due to complexities/Impairments listed. [] Progression has been slowed due to co-morbidities. [x] Plan just implemented, too soon to assess goals progression <30days   [] Goals require adjustment due to lack of progress  [] Patient is not progressing as expected and requires additional follow up with physician  [] Other    Prognosis for POC: [x] Good [] Fair  [] Poor      Patient requires continued skilled intervention: [x] Yes  [] No      ASSESSMENT:  Patient demonstrates less tightness in right cervical musculature, but still tightness noted as compared to left side. Needed cueing for most exercises this date and to perform slow and controlled. Demonstrates reduced pain and small increase in mobility since initial visit.     Treatment/Activity Tolerance:  [x] Patient tolerated treatment well [] Patient limited by fatique  [] Patient limited by pain  [] Patient limited by other medical complications  [] Other:     Prognosis: [] Good [x] Fair  [] Poor    Patient Requires Follow-up: [x] Yes  [] No    PLAN: See eval  [x] Continue per plan of care [] Alter current plan (see comments above)  [] Plan of care initiated [] Hold pending MD visit [] Discharge      Electronically signed by:  Dmitry Sharma PT, DPT [id number]     Note: If patient does not return for scheduled/ recommended follow up visits, this note will serve as a discharge from care along with most recent update on progress.

## 2021-08-17 ENCOUNTER — HOSPITAL ENCOUNTER (OUTPATIENT)
Dept: PHYSICAL THERAPY | Age: 81
Setting detail: THERAPIES SERIES
Discharge: HOME | End: 2021-08-17
Payer: MEDICARE

## 2021-08-17 DIAGNOSIS — E11.65 UNCONTROLLED TYPE 2 DIABETES MELLITUS WITH HYPERGLYCEMIA (HCC): ICD-10-CM

## 2021-08-17 PROCEDURE — 97140 MANUAL THERAPY 1/> REGIONS: CPT | Performed by: PHYSICAL THERAPIST

## 2021-08-17 PROCEDURE — 97110 THERAPEUTIC EXERCISES: CPT | Performed by: PHYSICAL THERAPIST

## 2021-08-17 NOTE — FLOWSHEET NOTE
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)  · Treatment Diagnosis: Neck pain (R69.2)  Insurance/Certification information:  PT Insurance Information: [employer]  Physician Information:  Referring Practitioner: Vasyl Stephen  Has the plan of care been signed (Y/N):        [x]  Yes  []  No     Date of Patient follow up with Physician: not scheduled    Is this a Progress Report:     []  Yes  [x]  No        If Yes:  Date Range for reporting period:  Beginnin2021  Ending    Progress report will be due (10 Rx or 30 days whichever is less):  7167      Recertification will be due (POC Duration  / 90 days whichever is less):         Visit # Insurance Allowable Auth Required   In-person 3  []  Yes []  No    Telehealth   []  Yes []  No    Total        Functional Scale: NDI 7/55 (14%)    Date assessed:  2021   Therapy Diagnosis/Practice Pattern: F      Number of Comorbidities:  []0     []1-2    [x]3+     Latex Allergy:  [x]NO      []YES  Preferred Language for Healthcare:   [x]English       []other:    Pain level:  0-4/10     SUBJECTIVE:  Patient reports her neck is sore on the right side today. Had a  and was with family over the weekend, all of which was stressful.     OBJECTIVE:    Observation:    Test measurements:  AROM bilateral cervical rotation 50 degrees    RESTRICTIONS/PRECAUTIONS: none    Exercises/Interventions:   Therapeutic Ex        Sets/sec Reps Notes   Supine chin tucks 5\" 15x    Serratus punches 3\" 20x Needs cueing   Upper trap stretch 20\" 3x B Small movement   In front of mirror; VC needed Finisher corner reach, BUE circles 3\" 15x ea With cervical neutral   No $ 3\" 20x Back against wall         RTB rows 3\" 15 Needs cueing   YTB ext 3\" 15 Needs cueing                     Patient ed   Proper scap retraction, gentle chin tucks, posture                     Manual Intervention           STM bilateral upper traps, cervical paraspinals, suboccipitals 16'  gentle   Seated cervical rotation MWM 2 5x ea                                   NMR re-education          T-spine Ext- foam roll      Chin tucks                         Traction                                     Therapeutic Exercise and NMR EXR  [x] (46371) Provided verbal/tactile cueing for activities related to strengthening, flexibility, endurance, ROM  for improvements in cervical, postural, scapular, scapulothoracic and UE control with self care, reaching, carrying, lifting, house/yardwork, driving/computer work.    [] (86393) Provided verbal/tactile cueing for activities related to improving balance, coordination, kinesthetic sense, posture, motor skill, proprioception  to assist with cervical, scapular, scapulothoracic and UE control with self care, reaching, carrying, lifting, house/yardwork, driving/computer work. Therapeutic Activities:    [] (83417 or 79453) Provided verbal/tactile cueing for activities related to improving balance, coordination, kinesthetic sense, posture, motor skill, proprioception and motor activation to allow for proper function of cervical, scapular, scapulothoracic and UE control with self care, carrying, lifting, driving/computer work.      Home Exercise Program:    [x] (67203) Reviewed/Progressed HEP activities related to strengthening, flexibility, endurance, ROM of cervical, scapular, scapulothoracic and UE control with self care, reaching, carrying, lifting, house/yardwork, driving/computer work  [] (48484) Reviewed/Progressed HEP activities related to improving balance, coordination, kinesthetic sense, posture, allow for proper joint functioning as indicated by patients Functional Deficits. [] Progressing: [] Met: [] Not Met: [] Adjusted  3. Patient will demonstrate an increase in postural awareness and control and activation of  Deep cervical stabilizers to allow for proper functional mobility as indicated by patients Functional Deficits. [] Progressing: [] Met: [] Not Met: [] Adjusted   4. Patient will be able to drive without increased symptoms or restriction. [] Progressing: [] Met: [] Not Met: [] Adjusted  5. Patient will be able to play golf without increased symptoms or restrictions. (patient specific functional goal) [] Progressing: [] Met: [] Not Met: [] Adjusted      Overall Progression Towards Functional goals/ Treatment Progress Update:  [] Patient is progressing as expected towards functional goals listed. [] Progression is slowed due to complexities/Impairments listed. [] Progression has been slowed due to co-morbidities. [x] Plan just implemented, too soon to assess goals progression <30days   [] Goals require adjustment due to lack of progress  [] Patient is not progressing as expected and requires additional follow up with physician  [] Other    Prognosis for POC: [x] Good [] Fair  [] Poor      Patient requires continued skilled intervention: [x] Yes  [] No      ASSESSMENT:  Patient with improved AROM as compared to initial visit. Continues to demonstrate crepitus and pain with rotation movements. Required cueing throughout therex routine for posture and avoiding shrugs. Patient to focus on HEP until NV and will add more if able.     Treatment/Activity Tolerance:  [x] Patient tolerated treatment well [] Patient limited by fatique  [] Patient limited by pain  [] Patient limited by other medical complications  [] Other:     Prognosis: [] Good [x] Fair  [] Poor    Patient Requires Follow-up: [x] Yes  [] No    PLAN: See eval  [x] Continue per plan of care [] Alter current plan (see comments above)  [] Plan of care initiated [] Hold pending MD visit [] Discharge      Electronically signed by:  Meron Bryant PT, DPT [id number]     Note: If patient does not return for scheduled/ recommended follow up visits, this note will serve as a discharge from care along with most recent update on progress.

## 2021-08-18 RX ORDER — ATORVASTATIN CALCIUM 40 MG/1
TABLET, FILM COATED ORAL
Qty: 90 TABLET | Refills: 3 | Status: SHIPPED | OUTPATIENT
Start: 2021-08-18 | End: 2022-02-01 | Stop reason: SDUPTHER

## 2021-08-18 NOTE — TELEPHONE ENCOUNTER
Refill Request     Last Seen: Last Seen Department: 6/21/2021  Last Seen by PCP: 6/21/2021    Last Written: 8/21/2020    Next Appointment:   Future Appointments   Date Time Provider [place] Berg   8/19/2021 11:45 AM Teofilo Mckinney, PT [facility] AND PT [facility]   8/24/2021 11:00 AM Meron Selma, PT [facility] AND PT [facility]   8/26/2021 11:00 AM Meron Selma, PT [facility] AND PT [facility]   9/27/2021  9:30 AM MD TOBIAS Christian  Annie - SINDY   6/27/2022 10:00 AM MD TOBIAS Christian  [place] - DYBIRDIE       Future appointment scheduled      Requested Prescriptions     Pending Prescriptions Disp Refills    atorvastatin (LIPITOR) 40 MG tablet [Pharmacy Med Name: ATORVASTATIN 40 MG TABLET] 90 tablet 3     Sig: TAKE ONE TABLET BY MOUTH DAILY

## 2021-08-19 ENCOUNTER — HOSPITAL ENCOUNTER (OUTPATIENT)
Dept: PHYSICAL THERAPY | Age: 81
Setting detail: THERAPIES SERIES
End: 2021-08-19
Payer: MEDICARE

## 2021-08-19 ENCOUNTER — HOSPITAL ENCOUNTER (OUTPATIENT)
Dept: PHYSICAL THERAPY | Age: 81
Setting detail: THERAPIES SERIES
Discharge: HOME | End: 2021-08-19
Payer: MEDICARE

## 2021-08-19 PROCEDURE — 97110 THERAPEUTIC EXERCISES: CPT | Performed by: PHYSICAL THERAPIST

## 2021-08-19 PROCEDURE — 97140 MANUAL THERAPY 1/> REGIONS: CPT | Performed by: PHYSICAL THERAPIST

## 2021-08-19 NOTE — FLOWSHEET NOTE
movement   In front of mirror; VC needed   Finisher corner reach, BUE circles 3\" 15x ea With cervical neutral   No $ 3\" 20x Back against wall         RTB rows 3\" 20 Needs max cueing   YTB ext 3\" 20 Needs cueing, but did well today with this exercise         Wall push ups with chin tuck 2 10    YTB ER 3\" 15x ea B    Patient ed   Proper scap retraction, gentle chin tucks, posture                     Manual Intervention           STM bilateral upper traps, cervical paraspinals, suboccipitals 14'  gentle   Seated cervical rotation MWM 2 5x ea                                   NMR re-education          T-spine Ext- foam roll      Chin tucks                         Traction                                     Therapeutic Exercise and NMR EXR  [x] (57779) Provided verbal/tactile cueing for activities related to strengthening, flexibility, endurance, ROM  for improvements in cervical, postural, scapular, scapulothoracic and UE control with self care, reaching, carrying, lifting, house/yardwork, driving/computer work.    [] (48642) Provided verbal/tactile cueing for activities related to improving balance, coordination, kinesthetic sense, posture, motor skill, proprioception  to assist with cervical, scapular, scapulothoracic and UE control with self care, reaching, carrying, lifting, house/yardwork, driving/computer work. Therapeutic Activities:    [] (77083 or 93562) Provided verbal/tactile cueing for activities related to improving balance, coordination, kinesthetic sense, posture, motor skill, proprioception and motor activation to allow for proper function of cervical, scapular, scapulothoracic and UE control with self care, carrying, lifting, driving/computer work.      Home Exercise Program:    [x] (72475) Reviewed/Progressed HEP activities related to strengthening, flexibility, endurance, ROM of cervical, scapular, scapulothoracic and UE control with self care, reaching, carrying, lifting, house/yardwork, driving/computer work  [] (14917) Reviewed/Progressed HEP activities related to improving balance, coordination, kinesthetic sense, posture, motor skill, proprioception of cervical, scapular, scapulothoracic and UE control with self care, reaching, carrying, lifting, house/yardwork, driving/computer work      Manual Treatments:  PROM / STM / Oscillations-Mobs:  G-I, II, III, IV (PA's, Inf., Post.)  [x] (40849) Provided manual therapy to mobilize soft tissue/joints of cervical/CT, scapular GHJ and UE for the purpose of decreasing headache, modulating pain, promoting relaxation,  increasing ROM, reducing/eliminating soft tissue swelling/inflammation/restriction, improving soft tissue extensibility and allowing for proper ROM for normal function with self care, reaching, carrying, lifting, house/yardwork, driving/computer work    Modalities:  HP x 10' to left upper trap while seated  Charges  Timed Code Treatment Minutes: 40'   Total Treatment Minutes: 48'     Medicare total (add KX at $2000)       [] EVAL (LOW) 30552   [] EVAL (MOD) 64760   [] EVAL (HIGH) 73720   [] RE-EVAL     [x] JZ(51870) x   2  [] IONTO  [] NMR (56443) x     [] VASO  [x] Manual (12951) x 1     [] Other:  [] TA x      [] Mech Traction (14943)  [] ES(attended) (57376)      [] ES (un) (71020):     GOALS:  Patient stated goal: \"To get rid of the pain. \"  [] Progressing: [] Met: [] Not Met: [] Adjusted    Therapist goals for Patient:   Short Term Goals: To be achieved in: 2 weeks  1. Independent in HEP and progression per patient tolerance, in order to prevent re-injury. [x] Progressing: [] Met: [] Not Met: [] Adjusted  2. Patient will have a decrease in pain to facilitate improvement in movement, function, and ADLs as indicated by Functional Deficits. [x] Progressing: [] Met: [] Not Met: [] Adjusted    Long Term Goals: To be achieved in: 8 weeks  1. Disability index score of 6% or less for the NDI to assist with reaching prior level of function.

## 2021-08-24 ENCOUNTER — HOSPITAL ENCOUNTER (OUTPATIENT)
Dept: PHYSICAL THERAPY | Age: 81
Setting detail: THERAPIES SERIES
Discharge: HOME | End: 2021-08-24
Payer: MEDICARE

## 2021-08-24 PROCEDURE — 97110 THERAPEUTIC EXERCISES: CPT | Performed by: PHYSICAL THERAPIST

## 2021-08-24 PROCEDURE — 97140 MANUAL THERAPY 1/> REGIONS: CPT | Performed by: PHYSICAL THERAPIST

## 2021-08-24 NOTE — FLOWSHEET NOTE
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)  · Treatment Diagnosis: Neck pain (A23.8)  Insurance/Certification information:  PT Insurance Information: [employer]/  Physician Information:  Referring Practitioner: Maritza Moncada  Has the plan of care been signed (Y/N):        [x]  Yes  []  No     Date of Patient follow up with Physician: not scheduled    Is this a Progress Report:     []  Yes  [x]  No        If Yes:  Date Range for reporting period:  Beginnin2021  Ending    Progress report will be due (10 Rx or 30 days whichever is less):  6078      Recertification will be due (POC Duration  / 90 days whichever is less):         Visit # Insurance Allowable Auth Required   In-person 5  []  Yes []  No    Telehealth   []  Yes []  No    Total        Functional Scale:  (14%)    Date assessed:  2021   Therapy Diagnosis/Practice Pattern: F      Number of Comorbidities:  []0     []1-2    [x]3+     Latex Allergy:  [x]NO      []YES  Preferred Language for Healthcare:   [x]English       []other:    Pain level:  0-2/10     SUBJECTIVE:  Patient reports she is doing pretty well. Has been able to get her exercises in since last visit consistently.   OBJECTIVE:    Observation:    Test measurements: NT this date    RESTRICTIONS/PRECAUTIONS: none    Exercises/Interventions:   Therapeutic Ex        Sets/sec Reps Notes   Supine chin tucks 10\" 15x    Serratus punches 3\" 30x Needs cueing   Upper trap stretch 20\" 3x B Small movement   In front of mirror; VC needed   Finisher corner reach, BUE circles 3\" 15x ea With cervical neutral   No $ YTB 3\" 20x Back against wall         RTB rows 3\" 20 Needs max cueing   YTB ext 3\" 20 Needs cueing, but did well today with this exercise         Wall push ups with chin tuck 2 10    YTB ER 3\" 2 x 10 ea B    Patient ed   Proper scap retraction, gentle chin tucks, posture                     Manual Intervention           STM bilateral upper traps, cervical paraspinals, suboccipitals 14'  gentle                                    NMR re-education          T-spine Ext- foam roll      Chin tucks                         Traction                                 HEP instruction:   Access Code: Marlee Sanders  URL: [email or web address]  Date: 08/05/2021  Prepared by: Ezra Rivera    Therapeutic Exercise and NMR EXR  [x] (87501) Provided verbal/tactile cueing for activities related to strengthening, flexibility, endurance, ROM  for improvements in cervical, postural, scapular, scapulothoracic and UE control with self care, reaching, carrying, lifting, house/yardwork, driving/computer work.    [] (00997) Provided verbal/tactile cueing for activities related to improving balance, coordination, kinesthetic sense, posture, motor skill, proprioception  to assist with cervical, scapular, scapulothoracic and UE control with self care, reaching, carrying, lifting, house/yardwork, driving/computer work. Therapeutic Activities:    [] (13411 or 20783) Provided verbal/tactile cueing for activities related to improving balance, coordination, kinesthetic sense, posture, motor skill, proprioception and motor activation to allow for proper function of cervical, scapular, scapulothoracic and UE control with self care, carrying, lifting, driving/computer work.      Home Exercise Program:    [x] (31652) Reviewed/Progressed HEP activities related to strengthening, flexibility, endurance, ROM of cervical, scapular, scapulothoracic and UE control with self care, reaching, carrying, lifting, house/yardwork, driving/computer work  [] (01772) Reviewed/Progressed HEP activities related to improving balance, coordination, kinesthetic sense, posture, motor skill, proprioception of cervical, scapular, scapulothoracic and UE control with self care, reaching, carrying, lifting, house/yardwork, driving/computer work      Manual Treatments:  PROM / STM / Oscillations-Mobs:  G-I, II, III, IV (PA's, Inf., Post.)  [x] (13041) Provided manual therapy to mobilize soft tissue/joints of cervical/CT, scapular GHJ and UE for the purpose of decreasing headache, modulating pain, promoting relaxation,  increasing ROM, reducing/eliminating soft tissue swelling/inflammation/restriction, improving soft tissue extensibility and allowing for proper ROM for normal function with self care, reaching, carrying, lifting, house/yardwork, driving/computer work    Modalities:  HP x 10' to left upper trap while seated  Charges  Timed Code Treatment Minutes: 40'   Total Treatment Minutes: 48'     Medicare total (add KX at $2000)       [] EVAL (LOW) 41434   [] EVAL (MOD) 99365   [] EVAL (HIGH) 84909   [] RE-EVAL     [x] LY(90875) x   2  [] IONTO  [] NMR (85210) x     [] VASO  [x] Manual (64438) x 1     [] Other:  [] TA x      [] Mech Traction (38207)  [] ES(attended) (92292)      [] ES (un) (68172):     GOALS:  Patient stated goal: \"To get rid of the pain. \"  [] Progressing: [] Met: [] Not Met: [] Adjusted    Therapist goals for Patient:   Short Term Goals: To be achieved in: 2 weeks  1. Independent in HEP and progression per patient tolerance, in order to prevent re-injury. [x] Progressing: [] Met: [] Not Met: [] Adjusted  2. Patient will have a decrease in pain to facilitate improvement in movement, function, and ADLs as indicated by Functional Deficits. [x] Progressing: [] Met: [] Not Met: [] Adjusted    Long Term Goals: To be achieved in: 8 weeks  1. Disability index score of 6% or less for the NDI to assist with reaching prior level of function.    [] Progressing: [] comments above)  [] Plan of care initiated [] Hold pending MD visit [] Discharge      Electronically signed by:  Fish Ragland, PT, DPT [id number]     Note: If patient does not return for scheduled/ recommended follow up visits, this note will serve as a discharge from care along with most recent update on progress.

## 2021-08-26 ENCOUNTER — APPOINTMENT (OUTPATIENT)
Dept: PHYSICAL THERAPY | Age: 81
End: 2021-08-26
Payer: MEDICARE

## 2021-08-31 ENCOUNTER — HOSPITAL ENCOUNTER (OUTPATIENT)
Dept: PHYSICAL THERAPY | Age: 81
Setting detail: THERAPIES SERIES
Discharge: HOME | End: 2021-08-31
Payer: MEDICARE

## 2021-08-31 PROCEDURE — 97140 MANUAL THERAPY 1/> REGIONS: CPT | Performed by: PHYSICAL THERAPIST

## 2021-08-31 PROCEDURE — 97110 THERAPEUTIC EXERCISES: CPT | Performed by: PHYSICAL THERAPIST

## 2021-08-31 NOTE — FLOWSHEET NOTE
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)  · Treatment Diagnosis: Neck pain (Q48.9)  Insurance/Certification information:  PT Insurance Information: [employer]/  Physician Information:  Referring Practitioner: Zane Castaneda  Has the plan of care been signed (Y/N):        [x]  Yes  []  No     Date of Patient follow up with Physician: not scheduled    Is this a Progress Report:     []  Yes  [x]  No        If Yes:  Date Range for reporting period:  Beginnin2021  Ending    Progress report will be due (10 Rx or 30 days whichever is less):  2688      Recertification will be due (POC Duration  / 90 days whichever is less):         Visit # Insurance Allowable Auth Required   In-person 6  []  Yes []  No    Telehealth   []  Yes []  No    Total        Functional Scale:  (14%)    Date assessed:  2021   Therapy Diagnosis/Practice Pattern: F      Number of Comorbidities:  []0     []1-2    [x]3+     Latex Allergy:  [x]NO      []YES  Preferred Language for Healthcare:   [x]English       []other:    Pain level:  0-2/10     SUBJECTIVE:  Patient reports she is doing pretty well. Not much pain in her neck. Has been getting headaches on occasion. Cannot attribute them to anything specific.   OBJECTIVE:    Observation:    Test measurements: PN NV    RESTRICTIONS/PRECAUTIONS: none    Exercises/Interventions:   Therapeutic Ex        Sets/sec Reps Notes   Supine chin tucks 10\" 15x    Supine chin tuck with ADD ring squeeze 5\" 10x    Serratus punches 1# 3\" 20x Needs cueing   Upper trap stretch 20\" 3x B Small movement   In front of mirror; VC needed   With cervical neutral   Back against wall         RTB rows 3\" 30 Needs max cueing   YTB ext 3\" 20 Needs cueing, but did well today with this exercise         Wall push ups with chin tuck 2 10    YTB ER 3\" 2 x 10 ea B    Patient ed   Proper scap retraction, gentle chin tucks, posture   Bicep curls with chin tuck NV                 Manual Intervention           STM bilateral upper traps, cervical paraspinals, suboccipitals 15'  gentle                                    NMR re-education          T-spine Ext- foam roll      Chin tucks                         Traction                                 HEP instruction:   Access Code: Rangel Salmon  URL: [email or web address]  Date: 08/05/2021  Prepared by: Mason Sims    Therapeutic Exercise and NMR EXR  [x] (88321) Provided verbal/tactile cueing for activities related to strengthening, flexibility, endurance, ROM  for improvements in cervical, postural, scapular, scapulothoracic and UE control with self care, reaching, carrying, lifting, house/yardwork, driving/computer work.    [] (03348) Provided verbal/tactile cueing for activities related to improving balance, coordination, kinesthetic sense, posture, motor skill, proprioception  to assist with cervical, scapular, scapulothoracic and UE control with self care, reaching, carrying, lifting, house/yardwork, driving/computer work. Therapeutic Activities:    [] (44778 or 71508) Provided verbal/tactile cueing for activities related to improving balance, coordination, kinesthetic sense, posture, motor skill, proprioception and motor activation to allow for proper function of cervical, scapular, scapulothoracic and UE control with self care, carrying, lifting, driving/computer work.      Home Exercise Program:    [x] (17909) Reviewed/Progressed HEP activities related to strengthening, flexibility, endurance, ROM of cervical, scapular, scapulothoracic and UE control with self care, reaching, carrying, lifting, house/yardwork, driving/computer work  [] (61486) Reviewed/Progressed HEP activities related to improving balance, coordination, kinesthetic sense, posture, motor skill, proprioception of cervical, scapular, scapulothoracic and UE control with self care, reaching, carrying, lifting, house/yardwork, driving/computer work      Manual Treatments:  PROM / STM / Oscillations-Mobs:  G-I, II, III, IV (PA's, Inf., Post.)  [x] (63986) Provided manual therapy to mobilize soft tissue/joints of cervical/CT, scapular GHJ and UE for the purpose of decreasing headache, modulating pain, promoting relaxation,  increasing ROM, reducing/eliminating soft tissue swelling/inflammation/restriction, improving soft tissue extensibility and allowing for proper ROM for normal function with self care, reaching, carrying, lifting, house/yardwork, driving/computer work    Modalities:  HP x 10' to left upper trap while seated  Charges  Timed Code Treatment Minutes: 40'   Total Treatment Minutes: 48'     Medicare total (add KX at $2000)       [] EVAL (LOW) 52553   [] EVAL (MOD) 29404   [] EVAL (HIGH) 55299   [] RE-EVAL     [x] LM(04373) x   2  [] IONTO  [] NMR (38639) x     [] VASO  [x] Manual (64138) x 1     [] Other:  [] TA x      [] Mech Traction (13262)  [] ES(attended) (91621)      [] ES (un) (57784):     GOALS:  Patient stated goal: \"To get rid of the pain. \"  [] Progressing: [] Met: [] Not Met: [] Adjusted    Therapist goals for Patient:   Short Term Goals: To be achieved in: 2 weeks  1. Independent in HEP and progression per patient tolerance, in order to prevent re-injury. [x] Progressing: [] Met: [] Not Met: [] Adjusted  2. Patient will have a decrease in pain to facilitate improvement in movement, function, and ADLs as indicated by Functional Deficits. [x] Progressing: [] Met: [] Not Met: [] Adjusted    Long Term Goals: To be achieved in: 8 weeks  1.  Disability index score of 6% or less for the NDI to assist with Yes  [] No    PLAN: See eval  [x] Continue per plan of care [] Alter current plan (see comments above)  [] Plan of care initiated [] Hold pending MD visit [] Discharge      Electronically signed by:  Shankar Flores, PT, DPT [id number]     Note: If patient does not return for scheduled/ recommended follow up visits, this note will serve as a discharge from care along with most recent update on progress.

## 2021-09-02 ENCOUNTER — APPOINTMENT (OUTPATIENT)
Dept: PHYSICAL THERAPY | Age: 81
End: 2021-09-02
Payer: MEDICARE

## 2021-09-03 DIAGNOSIS — I10 ESSENTIAL HYPERTENSION: ICD-10-CM

## 2021-09-03 RX ORDER — METOPROLOL SUCCINATE 100 MG/1
TABLET, EXTENDED RELEASE ORAL
Qty: 90 TABLET | Refills: 0 | Status: SHIPPED | OUTPATIENT
Start: 2021-09-03 | End: 2021-11-22 | Stop reason: SDUPTHER

## 2021-09-03 NOTE — TELEPHONE ENCOUNTER
.  Last office visit 6/21/2021     Last written 6/7/21 90 with 0      Next office visit scheduled 9/27/2021    Requested Prescriptions     Pending Prescriptions Disp Refills    metoprolol succinate (TOPROL XL) 100 MG extended release tablet [Pharmacy Med Name: METOPROLOL SUCC  MG TAB] 90 tablet 0     Sig: TAKE ONE TABLET BY MOUTH ONCE NIGHTLY

## 2021-09-07 ENCOUNTER — HOSPITAL ENCOUNTER (OUTPATIENT)
Dept: PHYSICAL THERAPY | Age: 81
Setting detail: THERAPIES SERIES
Discharge: HOME | End: 2021-09-07
Payer: MEDICARE

## 2021-09-07 PROCEDURE — 97110 THERAPEUTIC EXERCISES: CPT | Performed by: PHYSICAL THERAPIST

## 2021-09-07 PROCEDURE — 97140 MANUAL THERAPY 1/> REGIONS: CPT | Performed by: PHYSICAL THERAPIST

## 2021-09-07 NOTE — FLOWSHEET NOTE
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)  · Treatment Diagnosis: Neck pain (T04.9)  Insurance/Certification information:  PT Insurance Information: [employer]/  Physician Information:  Referring Practitioner: Dali Schofield  Has the plan of care been signed (Y/N):        [x]  Yes  []  No     Date of Patient follow up with Physician: not scheduled    Is this a Progress Report:     [x]  Yes  []  No        If Yes:  Date Range for reporting period:  Beginnin2021  Endin2021    Progress report will be due (10 Rx or 30 days whichever is less):        Recertification will be due (POC Duration  / 90 days whichever is less):  2021       Visit # Insurance Allowable Auth Required   In-person 7  []  Yes []  No    Telehealth   []  Yes []  No    Total        Functional Scale:  (16%)    Date assessed:  2021   Therapy Diagnosis/Practice Pattern: F      Number of Comorbidities:  []0     []1-2    [x]3+     Latex Allergy:  [x]NO      []YES  Preferred Language for Healthcare:   [x]English       []other:    Pain level:  0-3/10 in neck; headaches up to 5/10    SUBJECTIVE:  Patient reports she is not having any sharp pains in her neck. Still having headaches.   OBJECTIVE:    Observation:    Test measurements: AROM right cervical sidebending 25 deg, left cervical sidebending 25 degrees, right cervical rotation 50 deg, left cervical rotation 45 deg    RESTRICTIONS/PRECAUTIONS: none    Exercises/Interventions:   Therapeutic Ex        Sets/sec Reps Notes      Supine chin tuck with ADD ring squeeze 5\" 10x Serratus punches 2# 3\" 20x Needs cueing; challenging   Upper trap stretch 20\" 3x B Small movement   In front of mirror; VC needed   With cervical neutral   Back against wall         Needs max cueing   YTB ext 3\" 20 Needs cueing,    YTB tricep ext  2 10 Needs max cueing   Wall push ups with chin tuck 2 10    YTB ER 3\" 2 x 10 ea B    Patient ed   Posture, headaches and SOR, balance training, supportive footwear   Bicep curls with chin tuck 2# 2 10                Manual Intervention           STM bilateral upper traps, cervical paraspinals, suboccipitals 15'  gentle                                    NMR re-education          Tandem balance 10\" 2x ea foot forward Discussed practicing at home; patient unsteady on her feet at times                           Traction                                 HEP instruction:   Access Code: [facility]  URL: [email or web address]  Date: 08/05/2021  Prepared by: Henry Johnson    Therapeutic Exercise and NMR EXR  [x] (59251) Provided verbal/tactile cueing for activities related to strengthening, flexibility, endurance, ROM  for improvements in cervical, postural, scapular, scapulothoracic and UE control with self care, reaching, carrying, lifting, house/yardwork, driving/computer work.    [] (11061) Provided verbal/tactile cueing for activities related to improving balance, coordination, kinesthetic sense, posture, motor skill, proprioception  to assist with cervical, scapular, scapulothoracic and UE control with self care, reaching, carrying, lifting, house/yardwork, driving/computer work. Therapeutic Activities:    [] (13296 or 77077) Provided verbal/tactile cueing for activities related to improving balance, coordination, kinesthetic sense, posture, motor skill, proprioception and motor activation to allow for proper function of cervical, scapular, scapulothoracic and UE control with self care, carrying, lifting, driving/computer work.      Home Exercise Program:    [x] (92762) Reviewed/Progressed HEP activities related to strengthening, flexibility, endurance, ROM of cervical, scapular, scapulothoracic and UE control with self care, reaching, carrying, lifting, house/yardwork, driving/computer work  [] (74771) Reviewed/Progressed HEP activities related to improving balance, coordination, kinesthetic sense, posture, motor skill, proprioception of cervical, scapular, scapulothoracic and UE control with self care, reaching, carrying, lifting, house/yardwork, driving/computer work      Manual Treatments:  PROM / STM / Oscillations-Mobs:  G-I, II, III, IV (PA's, Inf., Post.)  [x] (53120) Provided manual therapy to mobilize soft tissue/joints of cervical/CT, scapular GHJ and UE for the purpose of decreasing headache, modulating pain, promoting relaxation,  increasing ROM, reducing/eliminating soft tissue swelling/inflammation/restriction, improving soft tissue extensibility and allowing for proper ROM for normal function with self care, reaching, carrying, lifting, house/yardwork, driving/computer work    Modalities:  HP x 10' to left upper trap while seated  Charges  Timed Code Treatment Minutes: 40'   Total Treatment Minutes: 48'     Medicare total (add KX at $2000)       [] EVAL (LOW) 70180   [] EVAL (MOD) 61114   [] EVAL (HIGH) 84013   [] RE-EVAL     [x] ZO(01918) x   2  [] IONTO  [] NMR (30457) x     [] VASO  [x] Manual (02734) x 1     [] Other:  [] TA x      [] Mech Traction (04235)  [] ES(attended) (84039)      [] ES (un) (69330):     GOALS:  Patient stated goal: \"To get rid of the pain. \"  [] Progressing: [] Met: [] Not Met: [] Adjusted    Therapist goals for Patient:   Short Term Goals: To be achieved in: 2 weeks  1. Independent in HEP and progression per patient tolerance, in order to prevent re-injury. [x] Progressing: [] Met: [] Not Met: [] Adjusted  2.  Patient will have a decrease in pain to facilitate improvement in movement, function, and ADLs as indicated by Functional Deficits. [x] Progressing: [] Met: [] Not Met: [] Adjusted    Long Term Goals: To be achieved in: 8 weeks  1. Disability index score of 6% or less for the NDI to assist with reaching prior level of function. [] Progressing: [] Met: [] Not Met: [] Adjusted  2. Patient will demonstrate increased AROM of bilateral cervical rotation to 50 degrees to allow for proper joint functioning as indicated by patients Functional Deficits. [x] Progressing: [] Met: [] Not Met: [] Adjusted  3. Patient will demonstrate an increase in postural awareness and control and activation of  Deep cervical stabilizers to allow for proper functional mobility as indicated by patients Functional Deficits. [x] Progressing: [] Met: [] Not Met: [] Adjusted   4. Patient will be able to drive without increased symptoms or restriction. [] Progressing: [x] Met: [] Not Met: [] Adjusted  5. Patient will be able to play golf without increased symptoms or restrictions. (patient specific functional goal) [x] Progressing: [] Met: [] Not Met: [] Adjusted      Overall Progression Towards Functional goals/ Treatment Progress Update:  [] Patient is progressing as expected towards functional goals listed. [] Progression is slowed due to complexities/Impairments listed. [] Progression has been slowed due to co-morbidities. [x] Plan just implemented, too soon to assess goals progression <30days   [] Goals require adjustment due to lack of progress  [] Patient is not progressing as expected and requires additional follow up with physician  [] Other    Prognosis for POC: [x] Good [] Fair  [] Poor      Patient requires continued skilled intervention: [x] Yes  [] No      ASSESSMENT:  Patient with less pain and irritability with cervical movements, and demonstrates improved AROM since initial visit. Patient reports headaches that have been occurring daily in the back of her head. Patient is TTP with SOR and STM to paraspinals.  Discussed self massage/release

## 2021-09-09 ENCOUNTER — HOSPITAL ENCOUNTER (OUTPATIENT)
Dept: PHYSICAL THERAPY | Age: 81
Setting detail: THERAPIES SERIES
Discharge: HOME | End: 2021-09-09
Payer: MEDICARE

## 2021-09-09 NOTE — FLOWSHEET NOTE
[facility],  [address]        Physical Therapy  Cancellation/No-show Note  Patient Name:  Royal Guzman  :  1940   Date:  2021  Cancelled visits to date: 0  No-shows to date: 1    For today's appointment patient:  []  Cancelled  []  Rescheduled appointment  [x]  No-show     Reason given by patient:  []  Patient ill  []  Conflicting appointment  []  No transportation    []  Conflict with work  [x]  No reason given  []  Other:     Comments:      Electronically signed by:  Tracy Fall, DPT [id number]

## 2021-09-14 ENCOUNTER — APPOINTMENT (OUTPATIENT)
Dept: PHYSICAL THERAPY | Age: 81
End: 2021-09-14
Payer: MEDICARE

## 2021-09-21 ENCOUNTER — HOSPITAL ENCOUNTER (OUTPATIENT)
Dept: PHYSICAL THERAPY | Age: 81
Setting detail: THERAPIES SERIES
Discharge: HOME | End: 2021-09-21
Payer: MEDICARE

## 2021-09-21 PROCEDURE — 97110 THERAPEUTIC EXERCISES: CPT | Performed by: PHYSICAL THERAPIST

## 2021-09-21 PROCEDURE — 97140 MANUAL THERAPY 1/> REGIONS: CPT | Performed by: PHYSICAL THERAPIST

## 2021-09-21 NOTE — FLOWSHEET NOTE
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)  · Treatment Diagnosis: Neck pain (O63.8)  Insurance/Certification information:  PT Insurance Information: [employer] [place]/  Physician Information:  Referring Practitioner: Karan Whittington  Has the plan of care been signed (Y/N):        [x]  Yes  []  No     Date of Patient follow up with Physician: not scheduled    Is this a Progress Report:     [x]  Yes  []  No        If Yes:  Date Range for reporting period:  Beginnin2021  Endin2021    Progress report will be due (10 Rx or 30 days whichever is less):        Recertification will be due (POC Duration  / 90 days whichever is less):  2021       Visit # Insurance Allowable Auth Required   In-person 8  []  Yes []  No    Telehealth   []  Yes []  No    Total        Functional Scale:  (16%)    Date assessed:  2021   Therapy Diagnosis/Practice Pattern: F      Number of Comorbidities:  []0     []1-2    [x]3+     Latex Allergy:  [x]NO      []YES  Preferred Language for Healthcare:   [x]English       []other:    Pain level:  0/10 in neck, 3/10 when headaches start    SUBJECTIVE:  Patient reports her neck is feeling good, just stiff.  Headaches are still there, but less intense  OBJECTIVE:    Observation: mild tightness upper trap and suboccipitals   Test measurements: NT this date    RESTRICTIONS/PRECAUTIONS: none    Exercises/Interventions:   Therapeutic Ex        Sets/sec Reps Notes      Supine chin tuck with ADD ring squeeze 5\" 10x    Serratus punches 2# 3\" 20x Needs cueing; challenging   Upper trap stretch 20\" 3x B Needs cueing for proper hold   In front of mirror; VC needed   With cervical neutral   Back against wall         Needs max cueing   YTB ext 3\" 20 Needs cueing,    Needs max cueing   Wall push ups with chin tuck 2 10    YTB ER 3\" 2 x 10 ea B    Patient ed   Posture, headaches and SOR, balance training, supportive footwear   Bicep curls with chin tuck 2# 2 10    HEP review 3'           Manual Intervention           STM bilateral upper traps, cervical paraspinals, suboccipitals 15'  gentle                                    NMR re-education          Tandem balance 10\" 4x ea foot forward Discussed practicing at home; patient unsteady on her feet at times                           Traction                                 HEP instruction:   Access Code: [facility]  URL: [email or web address]  Date: 08/05/2021  Prepared by: Meagan Hunt    Therapeutic Exercise and NMR EXR  [x] (60543) Provided verbal/tactile cueing for activities related to strengthening, flexibility, endurance, ROM  for improvements in cervical, postural, scapular, scapulothoracic and UE control with self care, reaching, carrying, lifting, house/yardwork, driving/computer work.    [] (05290) Provided verbal/tactile cueing for activities related to improving balance, coordination, kinesthetic sense, posture, motor skill, proprioception  to assist with cervical, scapular, scapulothoracic and UE control with self care, reaching, carrying, lifting, house/yardwork, driving/computer work. Therapeutic Activities:    [] (72954 or 84558) Provided verbal/tactile cueing for activities related to improving balance, coordination, kinesthetic sense, posture, motor skill, proprioception and motor activation to allow for proper function of cervical, scapular, scapulothoracic and UE control with self care, carrying, lifting, driving/computer work.      Home Exercise Program:    [x] (26727) Reviewed/Progressed HEP activities related to strengthening, flexibility, endurance, ROM of cervical, scapular, scapulothoracic and UE control with self care, reaching, carrying, lifting, house/yardwork, driving/computer work  [] (85977) Reviewed/Progressed HEP activities related to improving balance, coordination, kinesthetic sense, posture, motor skill, proprioception of cervical, scapular, scapulothoracic and UE control with self care, reaching, carrying, lifting, house/yardwork, driving/computer work      Manual Treatments:  PROM / STM / Oscillations-Mobs:  G-I, II, III, IV (PA's, Inf., Post.)  [x] (76046) Provided manual therapy to mobilize soft tissue/joints of cervical/CT, scapular GHJ and UE for the purpose of decreasing headache, modulating pain, promoting relaxation,  increasing ROM, reducing/eliminating soft tissue swelling/inflammation/restriction, improving soft tissue extensibility and allowing for proper ROM for normal function with self care, reaching, carrying, lifting, house/yardwork, driving/computer work    Modalities:  HP x 10' to left upper trap while seated  Charges  Timed Code Treatment Minutes: 40'   Total Treatment Minutes: 48'     Medicare total (add KX at $2000)       [] EVAL (LOW) 97688   [] EVAL (MOD) 70509   [] EVAL (HIGH) 32087   [] RE-EVAL     [x] ZE(52729) x   2  [] IONTO  [] NMR (36552) x     [] VASO  [x] Manual (53925) x 1     [] Other:  [] TA x      [] Mech Traction (00462)  [] ES(attended) (16490)      [] ES (un) (30603):     GOALS:  Patient stated goal: \"To get rid of the pain. \"  [] Progressing: [] Met: [] Not Met: [] Adjusted    Therapist goals for Patient:   Short Term Goals: To be achieved in: 2 weeks  1. Independent in HEP and progression per patient tolerance, in order to prevent re-injury. [x] Progressing: [] Met: [] Not Met: [] Adjusted  2. Patient will have a decrease in pain to facilitate improvement in movement, function, and ADLs as indicated by Functional Deficits.   [x] Progressing: [] Met: [] Not Met: [] Adjusted    Long Term Goals: To be achieved in: 8 weeks  1. Disability index score of 6% or less for the NDI to assist with reaching prior level of function. [] Progressing: [] Met: [] Not Met: [] Adjusted  2. Patient will demonstrate increased AROM of bilateral cervical rotation to 50 degrees to allow for proper joint functioning as indicated by patients Functional Deficits. [x] Progressing: [] Met: [] Not Met: [] Adjusted  3. Patient will demonstrate an increase in postural awareness and control and activation of  Deep cervical stabilizers to allow for proper functional mobility as indicated by patients Functional Deficits. [x] Progressing: [] Met: [] Not Met: [] Adjusted   4. Patient will be able to drive without increased symptoms or restriction. [] Progressing: [x] Met: [] Not Met: [] Adjusted  5. Patient will be able to play golf without increased symptoms or restrictions. (patient specific functional goal) [x] Progressing: [] Met: [] Not Met: [] Adjusted      Overall Progression Towards Functional goals/ Treatment Progress Update:  [] Patient is progressing as expected towards functional goals listed. [] Progression is slowed due to complexities/Impairments listed. [] Progression has been slowed due to co-morbidities. [x] Plan just implemented, too soon to assess goals progression <30days   [] Goals require adjustment due to lack of progress  [] Patient is not progressing as expected and requires additional follow up with physician  [] Other    Prognosis for POC: [x] Good [] Fair  [] Poor      Patient requires continued skilled intervention: [x] Yes  [] No      ASSESSMENT:  Patient with minimal pain in her neck and less intense headaches. BUE strength is improving. Will likely continue 1-2 more visits then DC with HEP if still progressing well.     Treatment/Activity Tolerance:  [x] Patient tolerated treatment well [] Patient limited by fatique  [] Patient limited by pain  [] Patient limited by other medical complications  [] Other:     Prognosis: [] Good [x] Fair  [] Poor    Patient Requires Follow-up: [x] Yes  [] No    PLAN: See eval  [x] Continue per plan of care [] Alter current plan (see comments above)  [] Plan of care initiated [] Hold pending MD visit [] Discharge      Electronically signed by:  Duayne Apgar, PT, DPT [id number]     Note: If patient does not return for scheduled/ recommended follow up visits, this note will serve as a discharge from care along with most recent update on progress.

## 2021-09-23 ENCOUNTER — APPOINTMENT (OUTPATIENT)
Dept: PHYSICAL THERAPY | Age: 81
End: 2021-09-23
Payer: MEDICARE

## 2021-09-27 ENCOUNTER — OFFICE VISIT (OUTPATIENT)
Dept: FAMILY MEDICINE CLINIC | Age: 81
End: 2021-09-27
Payer: MEDICARE

## 2021-09-27 VITALS
HEART RATE: 54 BPM | DIASTOLIC BLOOD PRESSURE: 84 MMHG | WEIGHT: 130.8 LBS | SYSTOLIC BLOOD PRESSURE: 128 MMHG | BODY MASS INDEX: 22.33 KG/M2 | OXYGEN SATURATION: 94 % | HEIGHT: 64 IN

## 2021-09-27 DIAGNOSIS — E11.65 UNCONTROLLED TYPE 2 DIABETES MELLITUS WITH HYPERGLYCEMIA (HCC): Primary | ICD-10-CM

## 2021-09-27 LAB — HBA1C MFR BLD: 7.8 %

## 2021-09-27 PROCEDURE — 83036 HEMOGLOBIN GLYCOSYLATED A1C: CPT | Performed by: FAMILY MEDICINE

## 2021-09-27 PROCEDURE — G0008 ADMIN INFLUENZA VIRUS VAC: HCPCS | Performed by: FAMILY MEDICINE

## 2021-09-27 PROCEDURE — 3051F HG A1C>EQUAL 7.0%<8.0%: CPT | Performed by: FAMILY MEDICINE

## 2021-09-27 PROCEDURE — 90694 VACC AIIV4 NO PRSRV 0.5ML IM: CPT | Performed by: FAMILY MEDICINE

## 2021-09-27 PROCEDURE — 99213 OFFICE O/P EST LOW 20 MIN: CPT | Performed by: FAMILY MEDICINE

## 2021-09-27 NOTE — PROGRESS NOTES
Vaccine Information Sheet, \"Influenza - Inactivated\"  given to Augustus Cook, or parent/legal guardian of  Augustus Cook and verbalized understanding. Patient responses:    Have you ever had a reaction to a flu vaccine? No  Are you able to eat eggs without adverse effects? Yes  Do you have any current illness? No  Have you ever had Guillian Hutchinson Syndrome? No    Flu vaccine given per order. Please see immunization tab.

## 2021-09-28 ENCOUNTER — APPOINTMENT (OUTPATIENT)
Dept: PHYSICAL THERAPY | Age: 81
End: 2021-09-28
Payer: MEDICARE

## 2021-09-28 NOTE — PROGRESS NOTES
Marylen Lindau (:  1940) is a 80 y.o. female,Established patient, here for evaluation of the following chief complaint(s):  3 Month Follow-Up (diabetes)         ASSESSMENT/PLAN:  1. Uncontrolled type 2 diabetes mellitus with hyperglycemia (HCC)  -     POCT glycosylated hemoglobin (Hb A1C)  Counseling at today's visit: focused on the need for regular aerobic exercise and discussed the advantages of a diet low in carbohydrates. She will continue with efforts and we will recheck in 3 months. Holding on med changes as her blood sugar is improving. Return in about 3 months (around 2021) for Diabetes. Subjective   SUBJECTIVE/OBJECTIVE:  HPI Patient is here for follow up on T2DM. She states that overall she has been doing ok. She lost her brother this summer, and her other brother and  have both had serious medical problems in the last few months. She states it has been stressful. She has been losing weight, doesn't feel she has as much appetite. Review of Systems -  Per above       Objective   Physical Exam  Vitals and nursing note reviewed. Constitutional:       Appearance: Normal appearance. Pulmonary:      Effort: Pulmonary effort is normal.   Neurological:      Mental Status: She is alert. Psychiatric:         Speech: Speech normal.         Behavior: Behavior normal.       Lab Review   Office Visit on 2021   Component Date Value    Hemoglobin A1C 2021 8.5         Results for POC orders placed in visit on 21   POCT glycosylated hemoglobin (Hb A1C)   Result Value Ref Range    Hemoglobin A1C 7.8 %         An electronic signature was used to authenticate this note.     --Sonya James MD

## 2021-10-04 NOTE — TELEPHONE ENCOUNTER
Refill Request     Last Seen: Last Seen Department: 9/27/2021  Last Seen by PCP: 9/27/2021    Last Written: 3/31/21    Next Appointment:   Future Appointments   Date Time Provider Jones Berg   10/12/2021 11:00 AM Mikala Cohen, PT [facility] [place] [facility]   1/3/2022  8:30 AM MD TOBIAS Evangelista  [facility] - [facility]   6/27/2022 10:00 AM MD TOBIAS Evangelista [facility] - [facility]       Future appointment scheduled      Requested Prescriptions     Pending Prescriptions Disp Refills    triamterene-hydroCHLOROthiazide (MAXZIDE-25) 37.5-25 MG per tablet [Pharmacy Med Name: Vanita Alarcon 37.5-25 MG TB] 90 tablet 1     Sig: TAKE ONE TABLET BY MOUTH DAILY

## 2021-10-06 RX ORDER — TRIAMTERENE AND HYDROCHLOROTHIAZIDE 37.5; 25 MG/1; MG/1
TABLET ORAL
Qty: 90 TABLET | Refills: 1 | Status: SHIPPED | OUTPATIENT
Start: 2021-10-06 | End: 2022-04-11 | Stop reason: SDUPTHER

## 2021-10-18 ENCOUNTER — TELEPHONE (OUTPATIENT)
Dept: FAMILY MEDICINE CLINIC | Age: 81
End: 2021-10-18

## 2021-10-18 NOTE — TELEPHONE ENCOUNTER
----- Message from Bronwyn Jewell sent at 10/18/2021 10:23 AM EDT -----  Subject: Appointment Request    Reason for Call: Urgent (Patient Request) Existing Condition Follow    QUESTIONS  Type of Appointment? Established Patient  Reason for appointment request? Available appointments did not meet   patient need  Additional Information for Provider? patient requesting to see PCP for   elevated blood pressure before she leaves for vacation this Friday. average readings for the last week were in the 140's to 150's. currently   takes blood pressure meds. ---------------------------------------------------------------------------  --------------  Korey Atkinsole INFO  What is the best way for the office to contact you? Do not leave any   message, patient will call back for answer  Preferred Call Back Phone Number? [phone]  ---------------------------------------------------------------------------  --------------  SCRIPT ANSWERS  Relationship to Patient? Self  (Is the patient requesting to be seen urgently for their symptoms?)? Yes  Have you been diagnosed with, awaiting test results for, or told that you   are suspected of having COVID-19 (Coronavirus)? (If patient has tested   negative or was tested as a requirement for work, school, or travel and   not based on symptoms, answer no)? No  Within the past two weeks have you developed any of the following symptoms   (answer no if symptoms have been present longer than 2 weeks or began   more than 2 weeks ago)? Fever or Chills, Cough, Shortness of breath or   difficulty breathing, Loss of taste or smell, Sore throat, Nasal   congestion, Sneezing or runny nose, Fatigue or generalized body aches   (answer no if pain is specific to a body part e.g. back pain), Diarrhea,   Headache?  Yes

## 2021-10-20 ENCOUNTER — HOSPITAL ENCOUNTER (OUTPATIENT)
Dept: VASCULAR LAB | Age: 81
Discharge: HOME | End: 2021-10-20
Payer: MEDICARE

## 2021-10-20 ENCOUNTER — TELEPHONE (OUTPATIENT)
Dept: FAMILY MEDICINE CLINIC | Age: 81
End: 2021-10-20

## 2021-10-20 ENCOUNTER — OFFICE VISIT (OUTPATIENT)
Dept: FAMILY MEDICINE CLINIC | Age: 81
End: 2021-10-20
Payer: MEDICARE

## 2021-10-20 VITALS
BODY MASS INDEX: 22.31 KG/M2 | DIASTOLIC BLOOD PRESSURE: 60 MMHG | WEIGHT: 130 LBS | HEART RATE: 55 BPM | SYSTOLIC BLOOD PRESSURE: 122 MMHG | OXYGEN SATURATION: 96 %

## 2021-10-20 DIAGNOSIS — R09.89 LEFT CAROTID BRUIT: ICD-10-CM

## 2021-10-20 DIAGNOSIS — I10 ESSENTIAL HYPERTENSION: Primary | ICD-10-CM

## 2021-10-20 DIAGNOSIS — R94.31 ABNORMAL EKG: ICD-10-CM

## 2021-10-20 LAB
A/G RATIO: 1.8 (ref 1.1–2.2)
ALBUMIN SERPL-MCNC: 4.6 G/DL (ref 3.4–5)
ALP BLD-CCNC: 59 U/L (ref 40–129)
ALT SERPL-CCNC: 18 U/L (ref 10–40)
ANION GAP SERPL CALCULATED.3IONS-SCNC: 12 MMOL/L (ref 3–16)
AST SERPL-CCNC: 36 U/L (ref 15–37)
BASOPHILS ABSOLUTE: 0.1 K/UL (ref 0–0.2)
BASOPHILS RELATIVE PERCENT: 1.1 %
BILIRUB SERPL-MCNC: 0.5 MG/DL (ref 0–1)
BUN BLDV-MCNC: 27 MG/DL (ref 7–20)
CALCIUM SERPL-MCNC: 10.6 MG/DL (ref 8.3–10.6)
CHLORIDE BLD-SCNC: 99 MMOL/L (ref 99–110)
CO2: 28 MMOL/L (ref 21–32)
CREAT SERPL-MCNC: 1.4 MG/DL (ref 0.6–1.2)
D DIMER: <200 NG/ML DDU (ref 0–229)
EOSINOPHILS ABSOLUTE: 0.2 K/UL (ref 0–0.6)
EOSINOPHILS RELATIVE PERCENT: 3.6 %
GFR AFRICAN AMERICAN: 44
GFR NON-AFRICAN AMERICAN: 36
GLOBULIN: 2.5 G/DL
GLUCOSE BLD-MCNC: 168 MG/DL (ref 70–99)
HCT VFR BLD CALC: 43.9 % (ref 36–48)
HEMOGLOBIN: 15.2 G/DL (ref 12–16)
LYMPHOCYTES ABSOLUTE: 1.8 K/UL (ref 1–5.1)
LYMPHOCYTES RELATIVE PERCENT: 26.6 %
MCH RBC QN AUTO: 29.9 PG (ref 26–34)
MCHC RBC AUTO-ENTMCNC: 34.5 G/DL (ref 31–36)
MCV RBC AUTO: 86.7 FL (ref 80–100)
MONOCYTES ABSOLUTE: 0.3 K/UL (ref 0–1.3)
MONOCYTES RELATIVE PERCENT: 4.6 %
NEUTROPHILS ABSOLUTE: 4.3 K/UL (ref 1.7–7.7)
NEUTROPHILS RELATIVE PERCENT: 64.1 %
PDW BLD-RTO: 14.3 % (ref 12.4–15.4)
PLATELET # BLD: 242 K/UL (ref 135–450)
PMV BLD AUTO: 7.7 FL (ref 5–10.5)
POTASSIUM SERPL-SCNC: 4.4 MMOL/L (ref 3.5–5.1)
PRO-BNP: 241 PG/ML (ref 0–449)
RBC # BLD: 5.07 M/UL (ref 4–5.2)
SODIUM BLD-SCNC: 139 MMOL/L (ref 136–145)
TOTAL PROTEIN: 7.1 G/DL (ref 6.4–8.2)
TROPONIN: <0.01 NG/ML
WBC # BLD: 6.7 K/UL (ref 4–11)

## 2021-10-20 PROCEDURE — 93000 ELECTROCARDIOGRAM COMPLETE: CPT | Performed by: NURSE PRACTITIONER

## 2021-10-20 PROCEDURE — 93880 EXTRACRANIAL BILAT STUDY: CPT

## 2021-10-20 PROCEDURE — 99214 OFFICE O/P EST MOD 30 MIN: CPT | Performed by: NURSE PRACTITIONER

## 2021-10-20 RX ORDER — BLOOD PRESSURE TEST KIT
1 KIT MISCELLANEOUS DAILY
Qty: 1 KIT | Refills: 0 | Status: SHIPPED | OUTPATIENT
Start: 2021-10-20 | End: 2022-03-17

## 2021-10-20 RX ORDER — ASPIRIN 325 MG
325 TABLET, DELAYED RELEASE (ENTERIC COATED) ORAL ONCE
Qty: 1 TABLET | Refills: 0 | Status: SHIPPED | OUTPATIENT
Start: 2021-10-20 | End: 2021-11-11 | Stop reason: DRUGHIGH

## 2021-10-20 ASSESSMENT — ENCOUNTER SYMPTOMS
RESPIRATORY NEGATIVE: 1
EYES NEGATIVE: 1

## 2021-10-20 NOTE — PROGRESS NOTES
10/20/2021     India Boyd (:  1940) is a 80 y.o. female, here for evaluation of the following medical concerns:    HPI    Pt is concerned about recent elevated BP. States that she monitors it at home with a wrist cuff and it has been running around 140/60 to 150/100. She is concerned b/c she is leaving to go to [place] on Saturday for a week. Taking Maxzide and metoprolol as directed. Denies headaches or change in vision. No h/o tobacco use. Review of Systems   Constitutional: Negative. Eyes: Negative. Respiratory: Negative. Cardiovascular: Negative. Neurological: Negative. Prior to Visit Medications    Medication Sig Taking? Authorizing Provider   Omeprazole Magnesium (PRILOSEC OTC PO) Take by mouth Yes Historical Provider, MD   Blood Pressure KIT 1 kit by Does not apply route daily Yes MARTINE Nolan CNP   aspirin 325 MG EC tablet Take 1 tablet by mouth once for 1 dose Yes MARTINE Nolan CNP   triamterene-hydroCHLOROthiazide (MAXZIDE-25) 37.5-25 MG per tablet TAKE ONE TABLET BY MOUTH DAILY Yes MARTINE Lambert CNP   metoprolol succinate (TOPROL XL) 100 MG extended release tablet TAKE ONE TABLET BY MOUTH ONCE NIGHTLY Yes MARTINE Lambert CNP   atorvastatin (LIPITOR) 40 MG tablet TAKE ONE TABLET BY MOUTH DAILY Yes MARTINE Lambert CNP   PARoxetine (PAXIL) 20 MG tablet Take 1 tablet by mouth daily Yes Emil Ontiveros MD   losartan (COZAAR) 50 MG tablet TAKE ONE TABLET BY MOUTH DAILY Yes Emil Ontiveros MD   montelukast (SINGULAIR) 10 MG tablet TAKE ONE TABLET BY MOUTH ONCE NIGHTLY Yes MARTINE Lambert CNP   fenofibrate (TRIGLIDE) 160 MG tablet TAKE ONE TABLET BY MOUTH DAILY Yes Emil Ontiveros MD   Probiotic Product (PROBIOTIC DAILY PO) Take by mouth Yes Historical Provider, MD   aspirin 81 MG EC tablet Take 81 mg by mouth every evening.  Yes Historical Provider, MD   albuterol sulfate  (90 Base) MCG/ACT inhaler Inhale 2 puffs into the lungs every 6 hours as needed for Wheezing  Patient not taking: Reported on 10/20/2021  Sarah Sears, APRN - CNP        Social History     Tobacco Use    Smoking status: Never Smoker    Smokeless tobacco: Never Used   Substance Use Topics    Alcohol use: Yes     Comment: occassionally        Vitals:    10/20/21 0957   BP: 122/60   Site: Right Upper Arm   Position: Sitting   Cuff Size: Large Adult   Pulse: 55   SpO2: 96%   Weight: 130 lb (59 kg)     Estimated body mass index is 22.31 kg/m² as calculated from the following:    Height as of 9/27/21: 5' 4\" (1.626 m). Weight as of this encounter: 130 lb (59 kg). Physical Exam  Vitals reviewed. Constitutional:       Appearance: Normal appearance. She is normal weight. HENT:      Head: Normocephalic. Eyes:      Pupils: Pupils are equal, round, and reactive to light. Neck:      Vascular: Normal carotid pulses. Carotid bruit present. No JVD. Comments: Left carotid bruit  Cardiovascular:      Rate and Rhythm: Normal rate and regular rhythm. Pulses: Normal pulses. Heart sounds: Normal heart sounds. Pulmonary:      Effort: Pulmonary effort is normal.      Breath sounds: Normal breath sounds. Skin:     General: Skin is warm and dry. Neurological:      General: No focal deficit present. Mental Status: She is alert and oriented to person, place, and time. Mental status is at baseline. Psychiatric:         Mood and Affect: Mood normal.         Behavior: Behavior normal.         Thought Content: Thought content normal.         Judgment: Judgment normal.         ASSESSMENT/PLAN:  1. Essential hypertension  BP is normal here in the office today, and has been at recent visits. I am concerned that maybe she is getting inaccurate readings from her wrist cuff at home.   I will order an arm BP cuff and discussed with the pt the proper way to check a BP (sitting for at least 15 minutes, feet flat on the floor, arm at heart level, no talking, no caffeine for 30 minutes prior). Last EKG done 2013 (sinus tachycardia) - will check today. - Blood Pressure KIT; 1 kit by Does not apply route daily  Dispense: 1 kit; Refill: 0  - EKG 12 Lead  - Justyn Luis MD, Cardiology, [facility]    2. Left carotid bruit  No known h/o carotid bruit. Last fasting lipid panel done 3/9/21 - , triglycerides 286, HDL 25, and LDL 77.  Pt is taking fenofibrate 160 mg daily. Denied any symptoms other than elevated BP readings at home over the past week (see #1). Pt scheduled for carotid ultrasound today - will f/u with pt regarding results and plan. - VL DUP CAROTID BILATERAL  - EKG 12 Lead  - Justyn Luis MD, Cardiology, [facility]    3. Abnormal EKG  EKG showed sinus bradycardia with ST depression. Previous EKG done in 2013 showed sinus tachycardia. No other EKG's to compare to. Will give the pt  mg x's 1, orders STAT labs and refer to cardiology. Advised pt that I would call her today with lab results. If all are normal, will need to f/u with Cardiology for further evaluation/testing. Advised pt to call 911 or go to the nearest ER if she develops CP, SOB, headache, change in vision or any other acute symptoms. Pt VU and stated that she was going to go to lunch, get her carotid ultrasound and wait for my call regarding results. Will also send to pt's PCP.      - aspirin 325 MG EC tablet; Take 1 tablet by mouth once for 1 dose  Dispense: 1 tablet; Refill: 0  - TROPONIN  - BRAIN NATRIURETIC PEPTIDE (BNP)  - COMPREHENSIVE METABOLIC PANEL  - CBC WITH AUTO DIFFERENTIAL  - D-DIMER, QUANTITATIVE  - [facility] - Selene Gomez MD, Cardiology, [facility]      Return for TBD. An electronic signature was used to authenticate this note.     --MARTINE Cottrell - CNP on 10/20/2021 at 12:29 PM

## 2021-10-20 NOTE — TELEPHONE ENCOUNTER
[facility] Scheduling @ [phone] to schedule STAT VL DUP Carotid Bilateral, spoke to [facility]. She is scheduled at [facility] 200pm , arrive at 130 pm. Check in at information desk, bring insurance card and I.d. Made patient aware.

## 2021-10-20 NOTE — Clinical Note
[facility] - please see my note and let me know if you have anything else you would like to add.   Thank you,   Fritz Arnold, CNP

## 2021-10-21 ENCOUNTER — TELEPHONE (OUTPATIENT)
Dept: FAMILY MEDICINE CLINIC | Age: 81
End: 2021-10-21

## 2021-10-21 NOTE — TELEPHONE ENCOUNTER
Myself and the office staff has tried to call the pt several times and have left messages. I called again today and her , Allan Barboza answered (on [employer]IP[employer]). He stated that Wilson Henao was taking a bath, and apologized for not answering the phone b/c they thought it was a . I rev'd all results and plan with him (see carotid doppler result message). He VU and plans to call cardiology today to get her scheduled. I told him that she was welcome to call me back if she had any further questions/concerned. He thanked me for my time and for the personal phone call.

## 2021-11-11 ENCOUNTER — TELEPHONE (OUTPATIENT)
Dept: CARDIOLOGY CLINIC | Age: 81
End: 2021-11-11

## 2021-11-11 ENCOUNTER — OFFICE VISIT (OUTPATIENT)
Dept: CARDIOLOGY CLINIC | Age: 81
End: 2021-11-11
Payer: MEDICARE

## 2021-11-11 VITALS
DIASTOLIC BLOOD PRESSURE: 74 MMHG | SYSTOLIC BLOOD PRESSURE: 168 MMHG | OXYGEN SATURATION: 96 % | WEIGHT: 131 LBS | HEART RATE: 59 BPM | BODY MASS INDEX: 23.21 KG/M2 | HEIGHT: 63 IN

## 2021-11-11 DIAGNOSIS — T78.40XD ALLERGY, SUBSEQUENT ENCOUNTER: Primary | ICD-10-CM

## 2021-11-11 DIAGNOSIS — I65.22 STENOSIS OF LEFT CAROTID ARTERY: ICD-10-CM

## 2021-11-11 DIAGNOSIS — I10 PRIMARY HYPERTENSION: ICD-10-CM

## 2021-11-11 DIAGNOSIS — E78.2 MIXED HYPERLIPIDEMIA: Primary | ICD-10-CM

## 2021-11-11 PROBLEM — I65.29 CAROTID STENOSIS: Status: ACTIVE | Noted: 2021-11-11

## 2021-11-11 PROCEDURE — 99204 OFFICE O/P NEW MOD 45 MIN: CPT | Performed by: INTERNAL MEDICINE

## 2021-11-11 RX ORDER — AMLODIPINE BESYLATE 5 MG/1
5 TABLET ORAL DAILY
Qty: 90 TABLET | Refills: 3 | Status: SHIPPED | OUTPATIENT
Start: 2021-11-11 | End: 2022-02-01 | Stop reason: SDUPTHER

## 2021-11-11 NOTE — PATIENT INSTRUCTIONS
Plan:  1. Recommend left heart catheterization to check for blockage. Discussed risks and benefits. Patient would like to proceed with this option. -recommend taking it easy until the procedure. 2. Echo to assess heart size and strength. 3. Recommend carotid doppler every 6 months to reassess carotid stenosis. 4. Start Amlodipine 5mg daily. 5. Follow up after procedure.

## 2021-11-11 NOTE — TELEPHONE ENCOUNTER
Patient seen in office 11/11/2021. [facility] discussed and agreed upon by [facility] and patient. Medications not discussed. Patient will need covid testing prior. Thank you! NEEDS TO BE DONE ASAP- OK TO BE WITH ANY INTERNATIONALIST WHO IS AVAILABLE. MEDICATION INSTRUCTIONS: HOLD triamterene- HCTZ the morning of procedure.

## 2021-11-11 NOTE — LETTER
Senora Friday   1940        [facility]   Cardiac Consultation    Referring Provider: Wilfrido Funk CNP. Chief Complaint   Patient presents with    New Patient     carotid stenois and abn ECG     Hypertension    Hyperlipidemia    Shortness of Breath    Fatigue      SUBJECTIVE: Ms. Bonifacio Turner has a PMH of HTN, HLD, DM II, CKD, GERD, and osteoarthritis; c/o fatigue and CAMILO today    History of Present Illness:  Ms. Bonifacio Turner is a 71yo female here as new patient referred from PCP, Dr. READ [facility], for c/o fatigue, CAMILO, and abnormal EKG. She has PMH HTN, HLD, borderline DM, carotid artery disease, and GERD. No cardiac history. Most recent ECG 10/20/2021 SB, ST abnormality c/w possible anterolateral Ischemia, 56 BPM (ECG 3/2013 NO ST changes). Most recent carotid bilateral doppler 10/20/2021 showed Right carotid <50% and Left carotid 50-69% stenoses. Of note, most recent GXT myoview 2010 that was negative for ischemia. Today, patient presents with her  who is patient of mine. She reports that her father had a history of carotid stenosis. She reports her 3 brothers all have a history of HTN and CAD--no specifics (2 ). She reports she is taking all medications as prescribed. She takes ASA 81mg. She reports that in the last year she has had an increase in fatigue. She reports she was at the beach 2 weeks ago and was having CAMILO, which is new. Her CAMILO is bothering her and new over last 2-3 weeks. Also c/o neck pain. She reports she went to physical therapy for neck pain. This has improved but she occasionally gets shooting pain in her neck. She reports she perspires easily. Patient denies current edema, chest pain, palpitations, dizziness or syncope.      Past Medical History:   has a past medical history of Anal fissure, Back pain, Chronic insomnia, Depression, Diabetic retinopathy of both eyes ([address] GERD (gastroesophageal reflux disease), Glossitis, Hearing loss, Hypercholesteremia, Hypertension, Lumbar radiculopathy, and Patellofemoral stress syndrome of right knee. Surgical History:   has a past surgical history that includes Cholecystectomy; Appendectomy; knee surgery (left knee); Colonoscopy (1/18/2016); Upper gastrointestinal endoscopy (1/18/2016); Cholecystectomy; Hysterectomy, total abdominal; and Ovary removal.     Social History:   reports that she has never smoked. She has never used smokeless tobacco. She reports current alcohol use. She reports that she does not use drugs. Family History:  family history includes Heart Disease in her brother; High Blood Pressure in an other family member; Stroke in her father. Home Medications:  Prior to Admission medications    Medication Sig Start Date End Date Taking? Authorizing Provider   Omeprazole Magnesium (PRILOSEC OTC PO) Take by mouth   Yes Historical Provider, MD   Blood Pressure KIT 1 kit by Does not apply route daily 10/20/21  Yes MARTINE Raymond CNP   triamterene-hydroCHLOROthiazide (MAXZIDE-25) 37.5-25 MG per tablet TAKE ONE TABLET BY MOUTH DAILY 10/6/21  Yes MARTINE Schneider CNP   metoprolol succinate (TOPROL XL) 100 MG extended release tablet TAKE ONE TABLET BY MOUTH ONCE NIGHTLY 9/3/21  Yes MARTINE Schneider CNP   atorvastatin (LIPITOR) 40 MG tablet TAKE ONE TABLET BY MOUTH DAILY 8/18/21  Yes MARTINE Schneider CNP   losartan (COZAAR) 50 MG tablet TAKE ONE TABLET BY MOUTH DAILY 5/25/21  Yes Dejon Horne MD   montelukast (SINGULAIR) 10 MG tablet TAKE ONE TABLET BY MOUTH ONCE NIGHTLY 5/19/21  Yes MARTINE Schneider CNP   fenofibrate (TRIGLIDE) 160 MG tablet TAKE ONE TABLET BY MOUTH DAILY 4/20/21  Yes Dejon Horne MD   Probiotic Product (PROBIOTIC DAILY PO) Take by mouth   Yes Historical Provider, MD   aspirin 81 MG EC tablet Take 81 mg by mouth every evening.  12/31/10  Yes Historical Provider, MD   PARoxetine (PAXIL) 20 MG tablet Take 1 tablet by mouth daily 6/21/21   Dejon Horne MD albuterol sulfate  (90 Base) MCG/ACT inhaler Inhale 2 puffs into the lungs every 6 hours as needed for Wheezing  Patient not taking: Reported on 11/11/2021 2/14/21   MARTINE Hunt CNP        Allergies:  Adhesive tape, Codeine, Iodides, Sulfa antibiotics, and Penicillins     Review of Systems:   · Constitutional: there has been no unanticipated weight loss. There's been no change in energy level, sleep pattern, or activity level. · Eyes: No visual changes or diplopia. No scleral icterus. · ENT: No Headaches, hearing loss or vertigo. No mouth sores or sore throat. · Cardiovascular: Reviewed in HPI  · Respiratory: No cough or wheezing, no sputum production. No hematemesis. · Gastrointestinal: No abdominal pain, appetite loss, blood in stools. No change in bowel or bladder habits. · Genitourinary: No dysuria, trouble voiding, or hematuria. · Musculoskeletal:  No gait disturbance, weakness or joint complaints. · Integumentary: No rash or pruritis. · Neurological: No headache, diplopia, change in muscle strength, numbness or tingling. No change in gait, balance, coordination, mood, affect, memory, mentation, behavior. · Psychiatric: No anxiety, no depression. · Endocrine: No malaise, fatigue or temperature intolerance. No excessive thirst, fluid intake, or urination. No tremor. · Hematologic/Lymphatic: No abnormal bruising or bleeding, blood clots or swollen lymph nodes. · Allergic/Immunologic: No nasal congestion or hives.         Physical Examination:    Vitals:    11/11/21 0740   BP: (!) 168/74   Pulse: 59   SpO2: 96%        Constitutional and General Appearance: NAD   Respiratory:  · Normal excursion and expansion without use of accessory muscles  · Resp Auscultation: Normal breath sounds without dullness  Cardiovascular:  · The apical impulses not displaced  · Heart tones are crisp and normal  · Cervical veins are not engorged  · +soft left carotid bruit   · Normal S1S2, No S3, No Murmur  · Peripheral pulses are symmetrical and full  · There is no clubbing, cyanosis of the extremities. · No edema  · Femoral Arteries: 2+ and equal  · Pedal Pulses: 2+ and equal   Abdomen:  · No masses or tenderness  · Liver/Spleen: No Abnormalities Noted  Neurological/Psychiatric:  · Alert and oriented in all spheres  · Moves all extremities well  · Exhibits normal gait balance and coordination  · No abnormalities of mood, affect, memory, mentation, or behavior are noted  Skin:  · Skin: warm and dry. Lab Results   Component Value Date    CHOL 159 03/09/2021    CHOL 153 06/26/2020    CHOL 169 01/29/2019     Lab Results   Component Value Date    TRIG 286 (H) 03/09/2021    TRIG 374 (H) 06/26/2020    TRIG 341 (H) 01/29/2019     Lab Results   Component Value Date    HDL 25 (L) 03/09/2021    HDL 27 (L) 06/26/2020    HDL 31 (L) 01/29/2019     Lab Results   Component Value Date    LDLCALC 77 03/09/2021    LDLCALC see below 06/26/2020    LDLCALC see below 01/29/2019     Lab Results   Component Value Date    LABVLDL 57 03/09/2021    LABVLDL see below 06/26/2020    LABVLDL see below 01/29/2019     No results found for: CHOLHDLRATIO    Assessment:     1. Mixed hyperlipidemia: I personally reviewed most recent lipids from 3/9/21 in Epic (see above). Note elevated TG and low HDL. Need to recheck and adjust accordingly. Continue lipitor 40mg qd and fenofibrate 160mg daily for now. 2. Primary hypertension: Suboptimal blood pressure control and will need adjustment of antihypertensive medical regimen. Will add norvasc 5mg qd. 3. Stenosis of left carotid artery: Most recent carotid bilateral doppler 10/20/2021 showed Right carotid <50% and Left carotid 50-69% stenoses. No neurologic symptoms and will need repeat doppler US in 6 months around April 2022. 4.      Abnormal EKG:  Most recent ECG 10/20/2021 SB, ST abnormality c/w possible anterolateral Ischemia, 56 BPM  (ECG 3/2013 NO ST changes).  Her ST changes are very concerning for ischemic change which is new and she has  Symptoms of fatigue and CAMILO concerning for anginal equivalent along with multiple CAD RF's including PVD,  borderline DM, HTN, HLD, and age. Based on these findings I recommend left heart cath for definitive evaluation of  coronary arteries. Risks, benefits, expectations, and alternative treatments were discussed. Questions appropriately  answered. Alanis Yusuf agrees to proceed and verbalized understanding. Need ECHO to assess LVEF/size      5. Fatigue and CAMILO: Concerned for CAMILO being anginal equivalent. See #4 above. Plan:  1. Recommend left heart catheterization to check for blockage. Discussed risks and benefits. Patient would like to proceed with this option. -recommend taking it easy until the procedure. 2. Echo to assess heart size and strength. 3. Recommend carotid doppler every 6 months to reassess carotid stenosis. 4. Start Amlodipine 5mg daily. 5. Follow up after procedure. This note has been scribed in the presence of Enid Lorenzo MD, by Chantelle Crouch RN.     I, Dr. Enid Lorenzo, personally performed the services described in this documentation, as scribed by the above signed scribe in my presence. It is both accurate and complete to my knowledge. I agree with the details independently gathered by the clinical support staff, while the remaining scribed note accurately describes my personal service to the patient. Cost of prescription medications and patient compliance have been reviewed with patient. All questions answered. Thank you for allowing me to participate in the care of this individual.    Lizzette Looney.  Keyon Uriarte M.D., [facility] [place]

## 2021-11-15 NOTE — TELEPHONE ENCOUNTER
Spoke with patient's spouse. Patient has been ill with flu like symptoms. He states they are trying to get into PCP hopefully today. She has been sick for about 4 days. He or she will call me when she is feeling better.     **Iodide allergy

## 2021-11-17 ENCOUNTER — TELEPHONE (OUTPATIENT)
Dept: FAMILY MEDICINE CLINIC | Age: 81
End: 2021-11-17

## 2021-11-17 NOTE — TELEPHONE ENCOUNTER
Refill Request     Last Seen: Last Seen Department: 10/20/2021  Last Seen by PCP: Visit date not found    Last Written: 5/19/21 with 1 refill     Next Appointment:   Future Appointments   Date Time Provider Jones Berg   11/19/2021  2:30 [facility]   1/3/2022  8:30 [facility]   6/27/2022 10:00 AM Deidre Alvarado MD [facility] [place]       Future appointment scheduled      Requested Prescriptions     Pending Prescriptions Disp Refills    montelukast (SINGULAIR) 10 MG tablet [Pharmacy Med Name: MONTELUKAST SOD 10 MG TABLET] 90 tablet 1     Sig: TAKE ONE TABLET BY MOUTH ONCE NIGHTLY

## 2021-11-17 NOTE — TELEPHONE ENCOUNTER
----- Message from Shreya Tenorio sent at 11/17/2021 10:33 AM EST -----  Subject: Appointment Request    Reason for Call: Urgent Cough Cold    QUESTIONS  Type of Appointment? Established Patient  Reason for appointment request? No appointments available during search  Additional Information for Provider? patient is requesting to set up a   appointment for a cough , mucus , just really awful; at time there are no   appointment during search so patient would like a call back to set up a   appointment as soon as possible.  ---------------------------------------------------------------------------  --------------  [id number]  What is the best way for the office to contact you? OK to leave message on   voicemail  Preferred Call Back Phone Number? [phone]  ---------------------------------------------------------------------------  --------------  SCRIPT ANSWERS  Relationship to Patient? Self  Are you currently unable to finish sentences due to any difficulty   breathing? No  Are you unable to swallow liquids? No  Are you having fevers (100.4 or greater), chills, or sweats? No  Do you have COPD, asthma or a chronic lung condition? No  Have your symptoms been present for more than 5 days? Yes   Have you been diagnosed with, awaiting test results for, or told that you   are suspected of having COVID-19 (Coronavirus)? (If patient has tested   negative or was tested as a requirement for work, school, or travel and   not based on symptoms, answer no)? No  Within the past two weeks have you developed any of the following symptoms   (answer no if symptoms have been present longer than 2 weeks or began   more than 2 weeks ago)? Fever or Chills, Cough, Shortness of breath or   difficulty breathing, Loss of taste or smell, Sore throat, Nasal   congestion, Sneezing or runny nose, Fatigue or generalized body aches   (answer no if pain is specific to a body part e.g. back pain), Diarrhea,   Headache?  Yes

## 2021-11-17 NOTE — TELEPHONE ENCOUNTER
Have scheduled pt for a VV on this Friday during red visit times and pt is asking how this can be done and her being listened to pt stated that this has been going on for a bit and that she stated she breaks out into a huge bad terrible sweat and then it is gone. Pt stated that she has a referral to a cardiologist but cant make it there until what ever is going on it clears up. Please advise if this appt is ok to keep and have or if pt needs to be seen sooner that Friday for a VV?   Thank you

## 2021-11-18 ENCOUNTER — NURSE ONLY (OUTPATIENT)
Dept: FAMILY MEDICINE CLINIC | Age: 81
End: 2021-11-18

## 2021-11-18 ENCOUNTER — VIRTUAL VISIT (OUTPATIENT)
Dept: FAMILY MEDICINE CLINIC | Age: 81
End: 2021-11-18
Payer: MEDICARE

## 2021-11-18 DIAGNOSIS — J22 LOWER RESPIRATORY INFECTION: ICD-10-CM

## 2021-11-18 DIAGNOSIS — J22 LOWER RESPIRATORY INFECTION: Primary | ICD-10-CM

## 2021-11-18 DIAGNOSIS — R05.9 COUGH: Primary | ICD-10-CM

## 2021-11-18 PROCEDURE — 99213 OFFICE O/P EST LOW 20 MIN: CPT | Performed by: FAMILY MEDICINE

## 2021-11-18 RX ORDER — DOXYCYCLINE HYCLATE 100 MG
100 TABLET ORAL 2 TIMES DAILY
Qty: 14 TABLET | Refills: 0 | Status: SHIPPED | OUTPATIENT
Start: 2021-11-18 | End: 2021-11-25

## 2021-11-18 RX ORDER — BENZONATATE 100 MG/1
100 CAPSULE ORAL 3 TIMES DAILY PRN
Qty: 30 CAPSULE | Refills: 0 | Status: SHIPPED | OUTPATIENT
Start: 2021-11-18 | End: 2021-11-28

## 2021-11-18 NOTE — PROGRESS NOTES
Lonny Ledbetter (:  1940) is a 80 y.o. female,Established patient, here for evaluation of the following chief complaint(s): Cough (Pt c/o cough with phlegm and fatigue. )         ASSESSMENT/PLAN:  1. Lower respiratory infection  -     benzonatate (TESSALON) 100 MG capsule; Take 1 capsule by mouth 3 times daily as needed for Cough, Disp-30 capsule, R-0Normal  -     doxycycline hyclate (VIBRA-TABS) 100 MG tablet; Take 1 tablet by mouth 2 times daily for 7 days, Disp-14 tablet, R-0Normal  COVID testing per orders. No follow-ups on file. SUBJECTIVE/OBJECTIVE:  HPI   Cough began on 21, no energy, sores in her mouth, mucus that is yellow and brown. No fever, +sweats really bad, no chills. No chest pain, no upper back pain. No shortness of breath other than coughing, no wheezing. Coughs a lot from lying down, interfering with her sleep. Has taken Mucinex and Delsym, Mucinex helped to loosen up phlegm. Daughter and her family have been sick with similar symptoms, her symptoms started 2 weeks ago for her daughter, no one tested for COVID.     Review of Systems    Patient-Reported Vitals 2021   Patient-Reported Weight 131 lb   Patient-Reported Height 5'3\"   Patient-Reported Systolic 023   Patient-Reported Diastolic 86        Physical Exam    [INSTRUCTIONS:  \"[x]\" Indicates a positive item  \"[]\" Indicates a negative item  -- DELETE ALL ITEMS NOT EXAMINED]    Constitutional: [x] Appears well-developed and well-nourished [x] No apparent distress      [] Abnormal -     Mental status: [x] Alert and awake  [x] Oriented to person/place/time [x] Able to follow commands    [] Abnormal -     Eyes:   EOM    [x]  Normal    [] Abnormal -   Sclera  [x]  Normal    [] Abnormal -          Discharge [x]  None visible   [] Abnormal -     HENT: [x] Normocephalic, atraumatic  [] Abnormal -   [x] Mouth/Throat: Mucous membranes are moist    External Ears [x] Normal  [] Abnormal -    Neck: [x] No visualized mass [] Abnormal -     Pulmonary/Chest: [x] Respiratory effort normal   [x] No visualized signs of difficulty breathing or respiratory distress        [] Abnormal -      Musculoskeletal:   [] Normal gait with no signs of ataxia         [] Normal range of motion of neck        [] Abnormal -     Neurological:        [x] No Facial Asymmetry (Cranial nerve 7 motor function) (limited exam due to video visit)          [x] No gaze palsy        [] Abnormal -          Skin:        [x] No significant exanthematous lesions or discoloration noted on facial skin         [] Abnormal -            Psychiatric:       [x] Normal Affect [] Abnormal -        [] No Hallucinations        Yaron Moore, was evaluated through a synchronous (real-time) audio-video encounter. The patient (or guardian if applicable) is aware that this is a billable service. Verbal consent to proceed has been obtained within the past 12 months. The visit was conducted pursuant to the emergency declaration under the [address] [place] [address] authority and the [employer] Act. Patient identification was verified, and a caregiver was present when appropriate. The patient was located in a state where the provider was credentialed to provide care. An electronic signature was used to authenticate this note.     --Deidre Alvarado MD     Home phone

## 2021-11-19 LAB — SARS-COV-2: NOT DETECTED

## 2021-11-19 RX ORDER — MONTELUKAST SODIUM 10 MG/1
TABLET ORAL
Qty: 90 TABLET | Refills: 1 | Status: SHIPPED | OUTPATIENT
Start: 2021-11-19 | End: 2022-05-20 | Stop reason: SDUPTHER

## 2021-11-22 ENCOUNTER — TELEPHONE (OUTPATIENT)
Dept: FAMILY MEDICINE CLINIC | Age: 81
End: 2021-11-22

## 2021-11-22 DIAGNOSIS — I10 ESSENTIAL HYPERTENSION: ICD-10-CM

## 2021-11-22 DIAGNOSIS — R53.83 EXHAUSTION: ICD-10-CM

## 2021-11-22 DIAGNOSIS — R05.9 COUGH: Primary | ICD-10-CM

## 2021-11-22 DIAGNOSIS — R61 DIAPHORESIS: ICD-10-CM

## 2021-11-22 RX ORDER — METOPROLOL SUCCINATE 100 MG/1
TABLET, EXTENDED RELEASE ORAL
Qty: 90 TABLET | Refills: 1 | Status: SHIPPED | OUTPATIENT
Start: 2021-11-22 | End: 2022-02-01 | Stop reason: SDUPTHER

## 2021-11-22 NOTE — TELEPHONE ENCOUNTER
I sent in her refill. I have ordered a chest x-ray if she would like to have that done. It will tell us if she has pneumonia or anything else we could visibly see in the lungs. That will help determine if her treatment plan should change or remain the same.

## 2021-11-22 NOTE — TELEPHONE ENCOUNTER
Pt. Reporting as instructed, States she has slight improvement from 11-. States she feels \"totally exhausted\"  Pt. Complaining of cough but phlegm is clear. No fever No Sob  Perspiring a lot. NO V-D    Also, requesting refill of Metoprolol.

## 2021-11-23 ENCOUNTER — HOSPITAL ENCOUNTER (OUTPATIENT)
Dept: GENERAL RADIOLOGY | Age: 81
Discharge: HOME | End: 2021-11-23
Payer: MEDICARE

## 2021-11-23 ENCOUNTER — TELEPHONE (OUTPATIENT)
Dept: FAMILY MEDICINE CLINIC | Age: 81
End: 2021-11-23

## 2021-11-23 ENCOUNTER — HOSPITAL ENCOUNTER (OUTPATIENT)
Age: 81
Discharge: HOME | End: 2021-11-23
Payer: MEDICARE

## 2021-11-23 DIAGNOSIS — R53.83 EXHAUSTION: ICD-10-CM

## 2021-11-23 DIAGNOSIS — R05.9 COUGH: ICD-10-CM

## 2021-11-23 DIAGNOSIS — R61 DIAPHORESIS: ICD-10-CM

## 2021-11-23 PROCEDURE — 71046 X-RAY EXAM CHEST 2 VIEWS: CPT

## 2021-11-23 NOTE — TELEPHONE ENCOUNTER
Called patient and informed her the chest  xray was normal and to continue doxycycline and get plenty of rest and drink a lot of fluids

## 2021-11-27 DIAGNOSIS — I10 ESSENTIAL HYPERTENSION: ICD-10-CM

## 2021-11-28 RX ORDER — LOSARTAN POTASSIUM 50 MG/1
TABLET ORAL
Qty: 90 TABLET | Refills: 1 | Status: SHIPPED | OUTPATIENT
Start: 2021-11-28 | End: 2022-02-01 | Stop reason: SDUPTHER

## 2021-11-28 NOTE — TELEPHONE ENCOUNTER
.  Refill Request     Last Seen: Last Seen Department: 11/18/2021  Last Seen by PCP: 11/18/2021    Last Written: 5/25/21 90 with 1     Next Appointment:   Future Appointments   Date Time Provider Jones Berg   1/3/2022  8:30 AM MD TOBIAS Felix   6/27/2022 10:00 AM MD TOBIAS Felix  [facility]         Requested Prescriptions     Pending Prescriptions Disp Refills    losartan (COZAAR) 50 MG tablet [Pharmacy Med Name: LOSARTAN POTASSIUM 50 MG TAB] 90 tablet 1     Sig: TAKE ONE TABLET BY MOUTH DAILY

## 2021-11-29 DIAGNOSIS — R06.02 SOB (SHORTNESS OF BREATH): Primary | ICD-10-CM

## 2021-11-29 PROBLEM — R07.89 OTHER CHEST PAIN: Status: ACTIVE | Noted: 2021-11-29

## 2021-11-29 NOTE — TELEPHONE ENCOUNTER
Spoke with patient. Patient is scheduled with Dr. Sheron Bolanos for Echo & Left Heart Cath on 12/8/21 at 10:30am [facility], arrival time of 8:30am to the Cath Lab. Please have patient arrive to the main entrance of [facility] and check in with the registration desk. Please call patient regarding medication instructions- IODIDE Allergy. Remind patient to be NPO after midnight (8 hours prior). Do not apply lotions/creams on skin the day of procedure. COVID testing - RAPID if needed. Patient did have a COVID test when she was ill @ Dr. Pantera Rivera office and it was negative. She is asking if she should wait to get booster COVID vaccine? [address] - fyi.

## 2021-11-30 NOTE — TELEPHONE ENCOUNTER
Due to iodide allergy, lets please pretreat with prednisone 40 mg BID the day prior and 40 mg the morning of procedure, Benadryl 50 mg BID the day prior and 50 mg the morning of procedure, and famotidine 20 mg BID the day prior and 20 mg the morning of procedure. Thanks.

## 2021-12-02 ENCOUNTER — TELEPHONE (OUTPATIENT)
Dept: FAMILY MEDICINE CLINIC | Age: 81
End: 2021-12-02

## 2021-12-02 NOTE — TELEPHONE ENCOUNTER
----- Message from Lisa Trivedi sent at 12/2/2021  4:41 PM EST -----  Subject: Message to Provider    QUESTIONS  Information for Provider? Pt is scheduled for the COVID Booster on   12/6/2021. She has a heart procedure on 12/8/2021/ Please let Pt know if   it will be okay to get the Booster shot or should she wait till after the   Heart Procedure. She would like to wait till after the Heart Procedure.   ---------------------------------------------------------------------------  --------------  CALL BACK INFO  What is the best way for the office to contact you? OK to leave message on   voicemail  Preferred Call Back Phone Number? [phone]  ---------------------------------------------------------------------------  --------------  SCRIPT ANSWERS  Relationship to Patient?  Self

## 2021-12-03 RX ORDER — FAMOTIDINE 20 MG/1
20 TABLET, FILM COATED ORAL DAILY
Qty: 3 TABLET | Refills: 1 | Status: CANCELLED | OUTPATIENT
Start: 2021-12-03 | End: 2021-12-06

## 2021-12-03 RX ORDER — PREDNISONE 20 MG/1
40 TABLET ORAL DAILY
Qty: 6 TABLET | Refills: 0 | Status: CANCELLED | OUTPATIENT
Start: 2021-12-03 | End: 2021-12-06

## 2021-12-03 RX ORDER — DIPHENHYDRAMINE HCL 50 MG
50 CAPSULE ORAL DAILY
Qty: 3 CAPSULE | Refills: 0 | Status: CANCELLED | OUTPATIENT
Start: 2021-12-03 | End: 2021-12-06

## 2021-12-03 NOTE — TELEPHONE ENCOUNTER
LVM for patient to relay instructions for pre medication prior to cath on 12/8/2021. Scripts pending.

## 2021-12-08 ENCOUNTER — HOSPITAL ENCOUNTER (OUTPATIENT)
Dept: CARDIAC CATH/INVASIVE PROCEDURES | Age: 81
Discharge: HOME | End: 2021-12-08
Attending: INTERNAL MEDICINE | Admitting: INTERNAL MEDICINE
Payer: MEDICARE

## 2021-12-08 ENCOUNTER — HOSPITAL ENCOUNTER (OUTPATIENT)
Dept: NON INVASIVE DIAGNOSTICS | Age: 81
Discharge: HOME | End: 2021-12-08
Attending: INTERNAL MEDICINE
Payer: MEDICARE

## 2021-12-08 VITALS — HEIGHT: 63 IN | WEIGHT: 127.4 LBS | BODY MASS INDEX: 22.57 KG/M2

## 2021-12-08 DIAGNOSIS — R06.02 SOB (SHORTNESS OF BREATH): ICD-10-CM

## 2021-12-08 LAB
ANION GAP SERPL CALCULATED.3IONS-SCNC: 12 MMOL/L (ref 3–16)
BUN BLDV-MCNC: 27 MG/DL (ref 7–20)
CALCIUM SERPL-MCNC: 10.3 MG/DL (ref 8.3–10.6)
CHLORIDE BLD-SCNC: 100 MMOL/L (ref 99–110)
CHOLESTEROL, TOTAL: 164 MG/DL (ref 0–199)
CO2: 27 MMOL/L (ref 21–32)
CREAT SERPL-MCNC: 1.4 MG/DL (ref 0.6–1.2)
EKG ATRIAL RATE: 67 BPM
EKG DIAGNOSIS: NORMAL
EKG P AXIS: 31 DEGREES
EKG P-R INTERVAL: 134 MS
EKG Q-T INTERVAL: 436 MS
EKG QRS DURATION: 92 MS
EKG QTC CALCULATION (BAZETT): 460 MS
EKG R AXIS: 58 DEGREES
EKG T AXIS: 16 DEGREES
EKG VENTRICULAR RATE: 67 BPM
GFR AFRICAN AMERICAN: 44
GFR NON-AFRICAN AMERICAN: 36
GLUCOSE BLD-MCNC: 152 MG/DL (ref 70–99)
HCT VFR BLD CALC: 41.1 % (ref 36–48)
HDLC SERPL-MCNC: 29 MG/DL (ref 40–60)
HEMOGLOBIN: 14 G/DL (ref 12–16)
LDL CHOLESTEROL CALCULATED: ABNORMAL MG/DL
LDL CHOLESTEROL DIRECT: 84 MG/DL
LV EF: 62 %
LVEF MODALITY: NORMAL
MCH RBC QN AUTO: 29.8 PG (ref 26–34)
MCHC RBC AUTO-ENTMCNC: 34.1 G/DL (ref 31–36)
MCV RBC AUTO: 87.3 FL (ref 80–100)
PDW BLD-RTO: 14.3 % (ref 12.4–15.4)
PLATELET # BLD: 215 K/UL (ref 135–450)
PMV BLD AUTO: 6.8 FL (ref 5–10.5)
POTASSIUM REFLEX MAGNESIUM: 3.6 MMOL/L (ref 3.5–5.1)
RBC # BLD: 4.71 M/UL (ref 4–5.2)
SARS-COV-2, NAAT: NOT DETECTED
SODIUM BLD-SCNC: 139 MMOL/L (ref 136–145)
TRIGL SERPL-MCNC: 309 MG/DL (ref 0–150)
VLDLC SERPL CALC-MCNC: ABNORMAL MG/DL
WBC # BLD: 5.9 K/UL (ref 4–11)

## 2021-12-08 PROCEDURE — 93458 L HRT ARTERY/VENTRICLE ANGIO: CPT | Performed by: INTERNAL MEDICINE

## 2021-12-08 PROCEDURE — 85027 COMPLETE CBC AUTOMATED: CPT

## 2021-12-08 PROCEDURE — 87635 SARS-COV-2 COVID-19 AMP PRB: CPT

## 2021-12-08 PROCEDURE — C1769 GUIDE WIRE: HCPCS

## 2021-12-08 PROCEDURE — 93306 TTE W/DOPPLER COMPLETE: CPT

## 2021-12-08 PROCEDURE — 2500000003 HC RX 250 WO HCPCS

## 2021-12-08 PROCEDURE — 80048 BASIC METABOLIC PNL TOTAL CA: CPT

## 2021-12-08 PROCEDURE — 93458 L HRT ARTERY/VENTRICLE ANGIO: CPT

## 2021-12-08 PROCEDURE — 93010 ELECTROCARDIOGRAM REPORT: CPT | Performed by: INTERNAL MEDICINE

## 2021-12-08 PROCEDURE — 2709999900 HC NON-CHARGEABLE SUPPLY

## 2021-12-08 PROCEDURE — 99152 MOD SED SAME PHYS/QHP 5/>YRS: CPT

## 2021-12-08 PROCEDURE — C1887 CATHETER, GUIDING: HCPCS

## 2021-12-08 PROCEDURE — 80061 LIPID PANEL: CPT

## 2021-12-08 PROCEDURE — C1894 INTRO/SHEATH, NON-LASER: HCPCS

## 2021-12-08 PROCEDURE — 93005 ELECTROCARDIOGRAM TRACING: CPT | Performed by: INTERNAL MEDICINE

## 2021-12-08 PROCEDURE — 6360000002 HC RX W HCPCS

## 2021-12-08 PROCEDURE — 6360000004 HC RX CONTRAST MEDICATION

## 2021-12-08 RX ORDER — ACETAMINOPHEN 325 MG/1
650 TABLET ORAL EVERY 4 HOURS PRN
Status: DISCONTINUED | OUTPATIENT
Start: 2021-12-08 | End: 2021-12-08 | Stop reason: HOSPADM

## 2021-12-08 RX ORDER — M-VIT,TX,IRON,MINS/CALC/FOLIC 27MG-0.4MG
1 TABLET ORAL DAILY
COMMUNITY

## 2021-12-08 RX ORDER — HEPARIN SODIUM 1000 [USP'U]/ML
INJECTION, SOLUTION INTRAVENOUS; SUBCUTANEOUS
Status: COMPLETED | OUTPATIENT
Start: 2021-12-08 | End: 2021-12-08

## 2021-12-08 RX ORDER — SODIUM CHLORIDE 9 MG/ML
25 INJECTION, SOLUTION INTRAVENOUS PRN
Status: DISCONTINUED | OUTPATIENT
Start: 2021-12-08 | End: 2021-12-08 | Stop reason: HOSPADM

## 2021-12-08 RX ORDER — SODIUM CHLORIDE 0.9 % (FLUSH) 0.9 %
5-40 SYRINGE (ML) INJECTION PRN
Status: DISCONTINUED | OUTPATIENT
Start: 2021-12-08 | End: 2021-12-08 | Stop reason: HOSPADM

## 2021-12-08 RX ORDER — SODIUM CHLORIDE 0.9 % (FLUSH) 0.9 %
5-40 SYRINGE (ML) INJECTION EVERY 12 HOURS SCHEDULED
Status: DISCONTINUED | OUTPATIENT
Start: 2021-12-08 | End: 2021-12-08 | Stop reason: HOSPADM

## 2021-12-08 RX ORDER — MIDAZOLAM HYDROCHLORIDE 5 MG/ML
INJECTION INTRAMUSCULAR; INTRAVENOUS
Status: COMPLETED | OUTPATIENT
Start: 2021-12-08 | End: 2021-12-08

## 2021-12-08 RX ORDER — SODIUM CHLORIDE 9 MG/ML
INJECTION, SOLUTION INTRAVENOUS CONTINUOUS
Status: ACTIVE | OUTPATIENT
Start: 2021-12-08 | End: 2021-12-08

## 2021-12-08 RX ORDER — FENTANYL CITRATE 50 UG/ML
INJECTION, SOLUTION INTRAMUSCULAR; INTRAVENOUS
Status: COMPLETED | OUTPATIENT
Start: 2021-12-08 | End: 2021-12-08

## 2021-12-08 RX ADMIN — HEPARIN SODIUM 5000 UNITS: 1000 INJECTION, SOLUTION INTRAVENOUS; SUBCUTANEOUS at 12:15

## 2021-12-08 RX ADMIN — MIDAZOLAM HYDROCHLORIDE 1 MG: 5 INJECTION INTRAMUSCULAR; INTRAVENOUS at 12:07

## 2021-12-08 RX ADMIN — MIDAZOLAM HYDROCHLORIDE 1 MG: 5 INJECTION INTRAMUSCULAR; INTRAVENOUS at 12:13

## 2021-12-08 RX ADMIN — FENTANYL CITRATE 25 MCG: 50 INJECTION, SOLUTION INTRAMUSCULAR; INTRAVENOUS at 12:04

## 2021-12-08 NOTE — PROCEDURES
CARDIAC CATHETERIZATION REPORT    Date of Procedure: 12/8/2021  : Jimena Corona DO  Primary Indication: Exertional dyspnea, ischemic ECG changes    Procedures Performed:  1. Coronary angiography  2. Left heart catheterization  3. Moderate conscious sedation    Procedural Details:  1. Access: Local anesthetic was given and access was obtained in the right radial artery using a micropuncture technique and a 6F Terumo Slender Sheath was placed without difficulty. 2. Diagnostic: Both a 5F TIG catheter and 5F 3DRC catheter were used to perform selective right coronary angiography. A 6F XB3.0 guide catheter was used to perform selective left coronary angiography. The 5F TIG catheter was used to perform the left heart catheterization. No significant gradient was observed on pull-back of the catheter across the aortic valve. 3. Hemostasis: At the end of the procedure, the radial sheath was removed and a hemoband was placed over the arteriotomy site and filled with air to maintain hemostasis. Findings:  1. Hemodynamics:     A. Opening arterial pressure: 147/53 (89) mmHg      B. LVEDP: 11 mmHg    2. Coronary anatomy:  A. Left main artery: The left main artery bifurcates into the left anterior descending artery and left circumflex artery. The left main artery has no angiographically significant disease. B. Left anterior descending artery: Transapical vessel which gives rise to 2 diagonal arteries. The LAD has a 20% proximal and 40-50% mid-vessel stenosis. The first diagonal artery is a small-medium caliber vessel with with minor luminal irregularities. The second diagonal artery is a medium caliber vessel with a 30% ostial stenosis. C. Left circumflex artery: Non-dominant vessel that gives rise to 3 obtuse marginal arteries. The mid-LCx has a 30% stenosis. The OM1 is a small vessel with a high origin and minor luminal irregularities.   The OM2 is a medium-large caliber vessel with no angiographically significant disease. The OM3 is a large vessel with no angiographically significant disease. D. Right coronary artery: Dominant vessel that gives rise to the posterior descending artery and posterolateral branch. The RCA has a 30% proximal stenosis. The remainder of the vessel has minor luminal irregualrities. The posterior descending artery has minor luminal irregularities. The RPLB has minor luminal irregularities. Technical Factors:  Complications: None. Estimated blood loss: Minimal.  Radiation: Air kerma 352 mGy and 8.0 minutes of fluoroscopy  Sedation: Moderate conscious sedation was administered by qualified nursing personnel under continuous hemodynamic monitoring, starting at 12:03 PM and ending at 12:34 PM.  Medications: 2.5 mg IA Verapamil, 2 mg IV Versed, 25 mcg IV Fentanyl, 5,000 units IV heparin  Contrast: 50 cc of Isovue     Impression:  1. Non-obstructive coronary artery disease. 2. Normal LVEDP. Plan:  1. Continue aspirin 81 mg daily and atorvastatin 40 mg daily. 2. Optimal medical therapy for CAD risk factors. 3. Follow-up with [facility] in one month.       Carroll Ellis, [address]

## 2021-12-08 NOTE — H&P
Brief Pre-Op Note/Sedation Assessment      Joesph Chapin  1940  [id number]  12:06 PM    Planned Procedure: Cardiac Catheterization Procedure  Post Procedure Plan: Return to same level of care  Consent: I have discussed with the patient and/or the patient representative the indication, alternatives, and the possible risks and/or complications of the planned procedure and the anesthesia methods. The patient and/or patient representative appear to understand and agree to proceed. Chief Complaint:   Exertional dyspnea, abnormal ECG    Indications for Cath Procedure:  1. Presentation:  New Onset Angina <= 2 months and Suspected CAD  2. Anginal Classification within 2 weeks:  CCS III - Symptoms with everyday living activities, i.e., moderate limitation  3. Angina Symptoms Assessment:  Atypical Chest Pain  4. Heart Failure Class within last 2 weeks:  No symptoms  5. Cardiovascular Instability:  No    Prior Ischemic Workup/Eval:  1. Pre-Procedural Medications: Yes: Aspirin, Beta Blockers and STATIN  2. Stress Test Completed? No    Does Patient need surgery? Cath Valve Surgery:  No    Pre-Procedure Medical History:  Vital Signs:  Ht 5' 3\" (1.6 m)   Wt 127 lb 6.4 oz (57.8 kg)   BMI 22.57 kg/m²     Allergies: Allergies   Allergen Reactions    Adhesive Tape      Band aid caused skin tears in past     Codeine Other (See Comments)     Felt strange    Iodides      Some issues with kidneys- told not to have per PCP     Sulfa Antibiotics      Pt can't remember reaction    Penicillins Rash     Medications:    No current facility-administered medications for this encounter.        Past Medical History:    Past Medical History:   Diagnosis Date    Anal fissure 01/07/2013    Back pain     Chronic insomnia     Depression     Diabetic retinopathy of both eyes (HCC)     GERD (gastroesophageal reflux disease)     Dr. Celine Shetty (GI)    Glossitis 03/16/2017    Hearing loss     Hypercholesteremia     Hypertension     Lumbar radiculopathy 03/03/2017    Patellofemoral stress syndrome of right knee 03/03/2017       Surgical History:    Past Surgical History:   Procedure Laterality Date    APPENDECTOMY      CHOLECYSTECTOMY      CHOLECYSTECTOMY      COLONOSCOPY  1/18/2016    Normal    HYSTERECTOMY, TOTAL ABDOMINAL      KNEE SURGERY  left knee    OVARY REMOVAL      unknown which side    UPPER GASTROINTESTINAL ENDOSCOPY  1/18/2016    Normal             Pre-Sedation:  Pre-Sedation Documentation and Exam:  I have personally completed a history, physical exam & review of systems for this patient (see notes). Prior History of Anesthesia Complications:   none    Modified Mallampati:  II (soft palate, uvula, fauces visible)    ASA Classification:  Class 3 - A patient with severe systemic disease that limits activity but is not incapacitating    Britt Scale: Activity:  2 - Able to move 4 extremities voluntarily on command  Respiration:  2 - Able to breathe deeply and cough freely  Circulation:  2 - BP+/- 20mmHg of normal  Consciousness:  2 - Fully awake  Oxygen Saturation (color):  2 - Able to maintain oxygen saturation >92% on room air    Sedation/Anesthesia Plan:  Guard the patient's safety and welfare. Minimize physical discomfort and pain. Minimize negative psychological responses to treatment by providing sedation and analgesia and maximize the potential amnesia. Patient to meet pre-procedure discharge plan.     Medication Planned:  midazolam intravenously and fentanyl intravenously    Patient is an appropriate candidate for plan of sedation:   yes      Electronically signed by Tristin Corona DO on 12/8/2021 at 12:06 PM

## 2021-12-09 ENCOUNTER — TELEPHONE (OUTPATIENT)
Dept: CARDIOLOGY CLINIC | Age: 81
End: 2021-12-09

## 2021-12-09 NOTE — TELEPHONE ENCOUNTER
Patient informed and she is taking fenofibrate 160mg daily. She notes that her pcp checked her BS and it is borderline elevated. She will continue to watch diet.

## 2022-01-03 ENCOUNTER — OFFICE VISIT (OUTPATIENT)
Dept: FAMILY MEDICINE CLINIC | Age: 82
End: 2022-01-03
Payer: MEDICARE

## 2022-01-03 VITALS
DIASTOLIC BLOOD PRESSURE: 58 MMHG | OXYGEN SATURATION: 93 % | TEMPERATURE: 97.4 F | HEART RATE: 62 BPM | BODY MASS INDEX: 22.86 KG/M2 | WEIGHT: 129 LBS | SYSTOLIC BLOOD PRESSURE: 132 MMHG | HEIGHT: 63 IN

## 2022-01-03 DIAGNOSIS — I65.22 STENOSIS OF LEFT CAROTID ARTERY: ICD-10-CM

## 2022-01-03 DIAGNOSIS — N18.32 STAGE 3B CHRONIC KIDNEY DISEASE (HCC): ICD-10-CM

## 2022-01-03 DIAGNOSIS — I10 PRIMARY HYPERTENSION: ICD-10-CM

## 2022-01-03 DIAGNOSIS — E11.69 DM TYPE 2 WITH DIABETIC DYSLIPIDEMIA (HCC): ICD-10-CM

## 2022-01-03 DIAGNOSIS — R82.998 LEUKOCYTES IN URINE: ICD-10-CM

## 2022-01-03 DIAGNOSIS — R10.2 VAGINAL PAIN: ICD-10-CM

## 2022-01-03 DIAGNOSIS — N95.2 ATROPHIC VAGINITIS: ICD-10-CM

## 2022-01-03 DIAGNOSIS — I25.10 CORONARY ARTERY DISEASE INVOLVING NATIVE CORONARY ARTERY OF NATIVE HEART WITHOUT ANGINA PECTORIS: Primary | ICD-10-CM

## 2022-01-03 DIAGNOSIS — G47.00 INSOMNIA, UNSPECIFIED TYPE: ICD-10-CM

## 2022-01-03 DIAGNOSIS — N95.1 HOT FLASH, MENOPAUSAL: ICD-10-CM

## 2022-01-03 DIAGNOSIS — E78.5 DM TYPE 2 WITH DIABETIC DYSLIPIDEMIA (HCC): ICD-10-CM

## 2022-01-03 LAB
BILIRUBIN, POC: ABNORMAL
BLOOD URINE, POC: ABNORMAL
CLARITY, POC: ABNORMAL
COLOR, POC: YELLOW
GLUCOSE URINE, POC: ABNORMAL
HBA1C MFR BLD: 7.2 %
KETONES, POC: ABNORMAL
LEUKOCYTE EST, POC: ABNORMAL
NITRITE, POC: ABNORMAL
PH, POC: 6.5
PROTEIN, POC: ABNORMAL
SPECIFIC GRAVITY, POC: 1.02
UROBILINOGEN, POC: 0.2

## 2022-01-03 PROCEDURE — 81002 URINALYSIS NONAUTO W/O SCOPE: CPT | Performed by: FAMILY MEDICINE

## 2022-01-03 PROCEDURE — 3051F HG A1C>EQUAL 7.0%<8.0%: CPT | Performed by: FAMILY MEDICINE

## 2022-01-03 PROCEDURE — 99214 OFFICE O/P EST MOD 30 MIN: CPT | Performed by: FAMILY MEDICINE

## 2022-01-03 PROCEDURE — 83036 HEMOGLOBIN GLYCOSYLATED A1C: CPT | Performed by: FAMILY MEDICINE

## 2022-01-03 RX ORDER — ESTRADIOL 0.1 MG/G
CREAM VAGINAL
Qty: 42.5 G | Refills: 0 | Status: SHIPPED | OUTPATIENT
Start: 2022-01-03 | End: 2022-03-17

## 2022-01-03 RX ORDER — FLUCONAZOLE 100 MG/1
100 TABLET ORAL ONCE
Qty: 1 TABLET | Refills: 0 | Status: SHIPPED | OUTPATIENT
Start: 2022-01-03 | End: 2022-01-03

## 2022-01-03 NOTE — PROGRESS NOTES
Prashanth Bean (:  1940) is a 80 y.o. female,Established patient, here for evaluation of the following chief complaint(s):  Diabetes, Insomnia, Sweats, and Vaginal Pain (skin burns with urination)         ASSESSMENT/PLAN:  1. 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 2. Vaginal pain  -     POCT Urinalysis no Micro  From vulvovaginitis. Will treat with estrace cream, diflucan. Call or return to clinic prn if these symptoms worsen or fail to improve as anticipated. 3. Coronary artery disease involving native coronary artery of native heart without angina pectoris  Stable. Continue follow up with cardiology as planned. 4. Stage 3b chronic kidney disease (HCC)  Stable, will continue to monitor every 6-12 months. 5. Primary hypertension  This problem is well controlled. Pt should continue current medication at current dose. 6. Stenosis of left carotid artery  Continue monitoring, being done through cardiology. 7. Insomnia, unspecified type  Recommend CBT-I. Can use Benadryl prn, but would avoid regular use. 8. Atrophic vaginitis  -     estradiol (ESTRACE VAGINAL) 0.1 MG/GM vaginal cream; Place 2 g vaginally daily for 14 days, THEN 2 g Twice a Week for 16 days. , Disp-42.5 g, R-0Normal  -     fluconazole (DIFLUCAN) 100 MG tablet; Take 1 tablet by mouth once for 1 dose, Disp-1 tablet, R-0Normal  Call or return to clinic prn if these symptoms worsen or fail to improve as anticipated. 9. Leukocytes in urine  -     Culture, Urine  10. Menopausal hot flashes  Will discontinue Paxil as it has not helped. Discussed other treatment options, but she has decided she'd rather live with it without taking any further medication. Return in about 3 months (around 4/3/2022) for Diabetes, also please schedule for CBT-I with Dr. Bill Mackenzie.          Subjective   SUBJECTIVE/OBJECTIVE:  Chief Complaint   Patient presents with    Diabetes    Insomnia    Sweats    Vaginal Pain     skin burns with urination       HPI   Has a lot happen since her last visit. Reports she was seen for high BP. Carotid bruit noted on exam. Sent for doppler, which was positive for stenosis. Sent to cardiology, who also did cath and found up 40-50% stenosis in the left anterior descending artery, 30% in a diagonal artery, 30% RCA. She states all of that has been very scary. She reports long standing problem with sleeping. Has been worse since her brother  a few months ago. Had tried melatonin, but it didn't help. Currently using Advil PM, but it didn't help last night. Continues to have episodes of sweats. She has been told in the past they are postmenopausal. She has been taking Paxil to try to help with them, but has not noticed any improvement since starting the medication. She finds it moderately annoying. Reports vaginal pain with urination. She states urinating itself is not painful, but it is painful when the urine touches irritated vaginal skin. She describes it as a burning sensation. She is also here to follow up on type 2 diabetes. Review of Systems - per above       Objective    Vitals:    22 0820 22 0829   BP: (!) 142/58 (!) 132/58   Site: Left Upper Arm Right Upper Arm   Position: Sitting Sitting   Cuff Size: Small Adult Small Adult   Pulse: 62    Temp: 97.4 °F (36.3 °C)    TempSrc: Oral    SpO2: 93%    Weight: 129 lb (58.5 kg)    Height: 5' 2.5\" (1.588 m)       Physical Exam  Vitals and nursing note reviewed. Constitutional:       Appearance: Normal appearance. Pulmonary:      Effort: Pulmonary effort is normal.   Genitourinary:     Labia:         Right: Tenderness present. Left: Tenderness present. Vagina: Erythema and tenderness present. No vaginal discharge, bleeding or prolapsed vaginal walls.       Comments: Intense erythema posteriorly, extremely tender to palpation  Neurological:      Mental Status: She is alert. Psychiatric:         Behavior: Behavior is withdrawn. Results for POC orders placed in visit on 01/03/22   POCT glycosylated hemoglobin (Hb A1C)   Result Value Ref Range    Hemoglobin A1C 7.2 %   POCT Urinalysis no Micro   Result Value Ref Range    Color, UA YELLOW     Clarity, UA CLOUDY     Glucose, UA POC NEG     Bilirubin, UA NEG     Ketones, UA NEG     Spec Grav, UA 1.020     Blood, UA POC NEG     pH, UA 6.5     Protein, UA POC NEG     Urobilinogen, UA 0.2     Leukocytes, UA TRACE     Nitrite, UA NEG           Cardiac Catheterization  Order: [id number]   Status: Edited Result - FINAL     Visible to patient: Yes (not seen)     Next appt: 02/01/2022 at 10:30 AM in Cardiology Torsten Petty MD)     0 Result Notes           Procedure Note    CARDIAC CATHETERIZATION REPORT     Date of Procedure: 12/8/2021  : Keisha Corona DO  Primary Indication: Exertional dyspnea, ischemic ECG changes     Procedures Performed:  1. Coronary angiography  2. Left heart catheterization  3. Moderate conscious sedation     Procedural Details:  1. Access: Local anesthetic was given and access was obtained in the right radial artery using a micropuncture technique and a 6F Terumo Slender Sheath was placed without difficulty. 2. Diagnostic: Both a 5F TIG catheter and 5F 3DRC catheter were used to perform selective right coronary angiography. A 6F XB3.0 guide catheter was used to perform selective left coronary angiography. The 5F TIG catheter was used to perform the left heart catheterization. No significant gradient was observed on pull-back of the catheter across the aortic valve. 3. Hemostasis: At the end of the procedure, the radial sheath was removed and a hemoband was placed over the arteriotomy site and filled with air to maintain hemostasis.     Findings:  1.  Hemodynamics:              A. Opening arterial pressure: 147/53 (89) mmHg [place] in one month.       Caesar Flores DO  [address]           Details    Reading Physician Reading Date Result Priority   Marilu Angeles MD  [phone] 12/8/2021    Unknown Provider Result 12/8/2021      Narrative & Impression  Transthoracic Echocardiography Report (TTE)      Demographics      Patient Name       Tapan Finders      Date of Study      12/08/2021         Gender              Female      Patient Number     [id number]         Date of Birth       1940      Visit Number       [id number]          Age                 80 year(s)      Accession Number   [id number]         Room Number         [facility]      Corporate ID       [id number]            Sonographer         [place]      Ordering Physician Nely Rodriguez MD, [facility]           Physician           Horacio Manriquez MD, [facility]     Procedure     Type of Study      TTE procedure:ECHOCARDIOGRAM COMPLETE 2D W DOPPLER W COLOR. Procedure Date  Date: 12/08/2021 Start: 09:30 AM     Study Location: [address] [place] [address] - Echo Lab  Technical Quality: Adequate visualization     Indications:Dyspnea/SOB.     Patient Status: Routine     Height: 64 inches Weight: 127 pounds BSA: 1.61 m2 BMI: 21.8 kg/m2     HR: 65 bpm BP: 159/59 mmHg      Conclusions      Summary   Left ventricular systolic function is normal with a 3D calculated EF of 62%. The left ventricle is normal in size with moderate septal hypertrophy. No obvious regional wall motion abnormalities noted. Indeterminate diastolic function. Systolic pulmonary artery pressure (SPAP) is normal and estimated at 35 mmHg   (right atrial pressure 3 mmHg).    Frequent ventricular ectopy noted throughout the exam.      Signature      ------------------------------------------------------------------   Electronically signed by Horacoi Manriquez MD, [facility] (Interpreting physician) on 12/08/2021 at 11:33 AM   ------------------------------------------------------------------      Findings      Left Ventricle   Left ventricular systolic function is normal with a 3D calculated EF of 62%. The left ventricle is normal in size with moderate septal hypertrophy. No obvious regional wall motion abnormalities noted. Indeterminate diastolic function. Mitral Valve   The mitral valve appears structurally normal.   Trace mitral regurgitation. Left Atrium   The left atrium is normal in size. Aortic Valve   The aortic valve appears normal in structure and function. There is no evidence of aortic regurgitation. Aorta   The aortic root is normal in size. Right Ventricle   The right ventricle is normal in size. Right ventricular systolic function is normal.   TAPSE is measured at 22 mm. S velocity is measured at 9.7 cm/s. RV EF calculated by 3D at 50%. FAC is calculated at 46%. Tricuspid Valve   The tricuspid valve is normal in structure. Trace tricuspid regurgitation. Systolic pulmonary artery pressure (SPAP) is normal and estimated at 35 mmHg   (right atrial pressure 3 mmHg). Right Atrium   The right atrium is normal in size. Right atrial area is 13.1 cm2. Right atrial volume is 19.4 ml/m2. Pulmonic Valve   The pulmonic valve is normal in structure. Trace pulmonic regurgitation. Pericardial Effusion   No pericardial effusion noted. Pleural Effusion   No pleural effusion noted. Miscellaneous   No obvious masses, thrombi, or vegetations are noted. IVC is normal in size (<2.1 cm) and collapses > 50% with respiration   consistent with normal right atrial pressure (3 mmHg).      M-Mode/2D Measurements (cm)      LV Diastolic Dimension: 1.84 cm LV Systolic Dimension: 6.72 cm   LV Septum Diastolic: 1.38 cm   LV PW Diastolic: 5.49 cm        AO Root Dimension: 3 cm   RV Diastolic Dimension: 9.48 cm AV Cusp Separation: 1.9 cm                                   LA Dimension: 3.8 cm   LVOT: 1.6 cm                    LA Area: 15.9 cm2                                   LA volume/Index: 38.8 ml /24 ml/m2     Doppler Measurements      AV Peak Velocity: 114 cm/s     MV Peak E-Wave: 93 cm/s   AV Peak Gradient: 5.2 mmHg     MV Peak A-Wave: 75.4 cm/s   LVOT Peak Velocity: 86.8 cm/s  MV E/A Ratio: 1.23      TR Velocity:284 cm/s   TR Gradient:32.26 mmHg   Estimated RAP:3 mmHg   Estimated RVSP: 35 mmHg   E' Septal Velocity: 4.57 cm/s   E' Lateral Velocity: 3.26 cm/s   E/E' ratio: 24.5      Aortic Valve      Peak Velocity: 114 cm/s   Peak Gradient: 5.2 mmHg      Cusp Separation: 1.9 cm     Aorta      Aortic Root: 3 cm   Ascending Aorta: 3 cm   LVOT Diameter: 1.6 cm        Component 3 wk ago   Left Ventricular Ejection Fraction 62    LVEF MODALITY         Narrative   Carotid Duplex Study        Demographics        Patient Name       Suzy Cantu        Date of Study      10/20/2021         Gender              Female        Patient Number     [id number]         Date of Birth       1940        Visit Number       [id number]          Age                 80 year(s)        Accession Number   [id number]         Room Number         OP        Corporate ID       [id number]            Sonographer         Zahra Munoz RDMS, T        Ordering Physician Lashanda Montelongo        [facility]                       CNP                Physician           Pattie Esquivel MD       Procedure       Type of Study:        Cerebral:Carotid, VL CAROTID DUPLEX BILATERAL.        Vascular Sonographer Report       Indications for Study:Carotid bruit.       Additional Indications:Patient was seen by PCP for hypertension concerns while   at office visit a carotid bruit was noted.       Carotid Artery Duplex Scan: Transverse and longitudinal grayscale and color   imaging of the extracranial carotid system including Doppler spectral waveform   analysis.       Impressions   Right Impression   The right internal carotid artery reveals a <50% diameter reducing stenosis. The right external carotid artery reveals a >50% diameter reducing stenosis. The right vertebral artery demonstrates normal antegrade flow. The right subclavian artery is visualized and demonstrates multiphasic flow. Left Impression   The left internal carotid artery reveals a 50-69% diameter reducing stenosis. The external carotid artery reveals a >50% diameter reducing stenosis by   velocity criteria. The left vertebral artery demonstrates normal antegrade flow. The left subclavian artery is visualized and demonstrates multiphasic flow. There is no previous exam for comparison.       Conclusions        Summary        The right internal carotid artery reveals a <50% diameter reducing stenosis.    The right external carotid artery reveals a >50% diameter reducing stenosis.    The left internal carotid artery reveals a 50-69% diameter reducing    stenosis.    The external carotid artery reveals a >50% diameter reducing stenosis by    velocity criteria.    The bilateral vertebral arteries have normal antegrade flow.        Signature        ------------------------------------------------------------------    Electronically signed by Yessica Padgett MD (Interpreting    physician) on 10/21/2021 at 05:38 PM    ------------------------------------------------------------------       Blood Pressure:Right arm 190/ mmHg. Left arm 190/ mmHg. Patient Status:STAT. Study [address] - Vascular Lab.    Technical Quality:Limited visualization due to calcific shadowing.       Risk Factors         - The patient's risk factor(s) include: dyslipidemia and arterial       hypertension.       Plaque     - A plaque was found in the Right Prox ICA.       The plaque characteristics are: high echogenicity, moderate severity and   heterogeneous texture. There is evidence of calcified plaque.         - A plaque was found in the Left CCA.       There is evidence of intimal thickening.         - A plaque was found in the Left Prox ICA.       The plaque characteristics are: high echogenicity, moderate severity and   heterogeneous texture. There is evidence of calcified plaque.       Velocities are measured in cm/s ; Diameters are measured in mm       Carotid Right Measurements   +----------+----+----+-----+----+   ! Location  !PSV ! EDV ! Angle! RI  !   +----------+----+----+-----+----+   ! Prox CCA  !101 !8.83! 60   !0.91!   +----------+----+----+-----+----+   ! Mid CCA   !95. 1!8.15! 60   !0.91!   +----------+----+----+-----+----+   ! Dist CCA [id number]! 60   !0.93!   +----------+----+----+-----+----+   ! Prox ICA  !110 !13. 8! 60   !0.87!   +----------+----+----+-----+----+   ! Mid ICA   !85. 5!13. 3! 60   !0.84!   +----------+----+----+-----+----+   ! Dist ICA  !129 !23  !60   !0.82! +----------+----+----+-----+----+   ! Prox ECA  !375 !26. 8! 60   !0.87!   +----------+----+----+-----+----+   ! Vertebral !57. 1!7.46!60   !0.87!   +----------+----+----+-----+----+   ! Subclavian! 150 !0   !60   !1   !   +----------+----+----+-----+----+         - There is antegrade vertebral flow noted on the right side.         - Additional Measurements:ICAPSV/CCAPSV 1.36. ICAEDV/CCAEDV 2.6.       Carotid Left Measurements   +---------------+----+----+-----+----+   ! Location       !PSV ! EDV ! Angle! RI  !   +---------------+----+----+-----+----+   ! Prox CCA       !60  !8.15! 60   !0.86!   +---------------+----+----+-----+----+   ! Mid CCA        !60.9!11. 1! 60   !0.82! +---------------+----+----+-----+----+   ! Dist CCA       !57  !11. 6! 60   !0.8 ! +---------------+----+----+-----+----+   ! Prox ICA       !120 !24. 4! 60   !0.8 !   +---------------+----+----+-----+----+   ! Mid ICA        !141 !22. 3! 60   !0.84!   +---------------+----+----+-----+----+   ! Dist ICA       !104 !18. 1! 46   !0.83!   +---------------+----+----+-----+----+   ! Prox ECA       !202 !20. 7! 60   !0.9 ! +---------------+----+----+-----+----+   ! Vertebral      !50.5!12. 3! 60   !0.76!   +---------------+----+----+-----+----+   ! Prox Subclavian! 165 !0   !60   !1   !   +---------------+----+----+-----+----+         - There is antegrade vertebral flow noted on the left side.         - Additional Measurements:ICAPSV/CCAPSV 2.32. ICAEDV/CCAEDV 2.99.         Lab Review   Admission on 12/08/2021, Discharged on 12/08/2021   Component Date Value    Left Ventricular Ejectio* 12/08/2021 62     LVEF MODALITY 12/08/2021 ECHO     Ventricular Rate 12/08/2021 67     Atrial Rate 12/08/2021 67     P-R Interval 12/08/2021 134     QRS Duration 12/08/2021 92     Q-T Interval 12/08/2021 436     QTc Calculation (Bazett) 12/08/2021 460     P Axis 12/08/2021 31     R Axis 12/08/2021 58     T Axis 12/08/2021 16     Diagnosis 12/08/2021 Sinus rhythm with occasional Premature ventricular complexesOtherwise normal ECGConfirmed by Piyush Wright MD, [place]  ([id number]) on 12/8/2021 6:50:39 PM     Cholesterol, Total 12/08/2021 164     Triglycerides 12/08/2021 309*    HDL 12/08/2021 29*    LDL Calculated 12/08/2021 see below     VLDL Cholesterol Calcula* 12/08/2021 see below     WBC 12/08/2021 5.9     RBC 12/08/2021 4.71     Hemoglobin 12/08/2021 14.0     Hematocrit 12/08/2021 41.1     MCV 12/08/2021 87.3     MCH 12/08/2021 29.8     MCHC 12/08/2021 34.1     RDW 12/08/2021 14.3     Platelets 55/55/9400 215     MPV 12/08/2021 6.8     Sodium 12/08/2021 139     Potassium reflex Magnesi* 12/08/2021 3.6     Chloride 12/08/2021 100     CO2 12/08/2021 27     Anion Gap 12/08/2021 12     Glucose 12/08/2021 152*    BUN 12/08/2021 27*    CREATININE 12/08/2021 1.4*    GFR Non-

## 2022-01-04 LAB — URINE CULTURE, ROUTINE: NORMAL

## 2022-02-01 ENCOUNTER — OFFICE VISIT (OUTPATIENT)
Dept: CARDIOLOGY CLINIC | Age: 82
End: 2022-02-01
Payer: MEDICARE

## 2022-02-01 VITALS
HEART RATE: 59 BPM | WEIGHT: 131.6 LBS | SYSTOLIC BLOOD PRESSURE: 138 MMHG | OXYGEN SATURATION: 97 % | HEIGHT: 63 IN | DIASTOLIC BLOOD PRESSURE: 60 MMHG | BODY MASS INDEX: 23.32 KG/M2

## 2022-02-01 DIAGNOSIS — I10 PRIMARY HYPERTENSION: ICD-10-CM

## 2022-02-01 DIAGNOSIS — I25.10 CORONARY ARTERY DISEASE INVOLVING NATIVE CORONARY ARTERY OF NATIVE HEART WITHOUT ANGINA PECTORIS: Primary | ICD-10-CM

## 2022-02-01 DIAGNOSIS — I65.23 BILATERAL CAROTID ARTERY STENOSIS: ICD-10-CM

## 2022-02-01 DIAGNOSIS — I10 ESSENTIAL HYPERTENSION: ICD-10-CM

## 2022-02-01 DIAGNOSIS — E78.2 MIXED HYPERLIPIDEMIA: ICD-10-CM

## 2022-02-01 DIAGNOSIS — E11.65 UNCONTROLLED TYPE 2 DIABETES MELLITUS WITH HYPERGLYCEMIA (HCC): ICD-10-CM

## 2022-02-01 PROCEDURE — 3051F HG A1C>EQUAL 7.0%<8.0%: CPT | Performed by: INTERNAL MEDICINE

## 2022-02-01 PROCEDURE — 99214 OFFICE O/P EST MOD 30 MIN: CPT | Performed by: INTERNAL MEDICINE

## 2022-02-01 RX ORDER — AMLODIPINE BESYLATE 5 MG/1
5 TABLET ORAL DAILY
Qty: 90 TABLET | Refills: 3 | Status: SHIPPED | OUTPATIENT
Start: 2022-02-01 | End: 2022-10-06

## 2022-02-01 RX ORDER — LOSARTAN POTASSIUM 50 MG/1
TABLET ORAL
Qty: 90 TABLET | Refills: 3 | Status: SHIPPED | OUTPATIENT
Start: 2022-02-01 | End: 2022-10-06

## 2022-02-01 RX ORDER — FLUCONAZOLE 100 MG/1
TABLET ORAL
Status: ON HOLD | COMMUNITY
Start: 2022-01-03 | End: 2022-03-10 | Stop reason: HOSPADM

## 2022-02-01 RX ORDER — PAROXETINE 10 MG/1
10 TABLET, FILM COATED ORAL EVERY MORNING
COMMUNITY
End: 2022-04-01 | Stop reason: DRUGHIGH

## 2022-02-01 RX ORDER — ATORVASTATIN CALCIUM 40 MG/1
TABLET, FILM COATED ORAL
Qty: 90 TABLET | Refills: 3 | Status: SHIPPED | OUTPATIENT
Start: 2022-02-01 | End: 2022-08-16 | Stop reason: SDUPTHER

## 2022-02-01 RX ORDER — FENOFIBRATE 160 MG/1
TABLET ORAL
Qty: 90 TABLET | Refills: 3 | Status: SHIPPED | OUTPATIENT
Start: 2022-02-01

## 2022-02-01 RX ORDER — METOPROLOL SUCCINATE 100 MG/1
TABLET, EXTENDED RELEASE ORAL
Qty: 90 TABLET | Refills: 1 | Status: SHIPPED | OUTPATIENT
Start: 2022-02-01

## 2022-02-01 NOTE — PATIENT INSTRUCTIONS
Plan:  1. Continue current medications- refills sent   2. You do have blockages in your heart arteries that we will treat with medication therapy. 3. Recheck carotid dopplers in April of this year   4. Recheck fasting lipids and LFT's next month. Will consider adding Vacepa if needed   5. Follow up with me in 6 months     . Your provider has ordered testing for further evaluation. An order/prescription has been included in your paper work.  To schedule outpatient testing, contact Central Scheduling by calling [address] [place] [address] ([phone]).

## 2022-02-01 NOTE — PROGRESS NOTES
[address]   Cardiac Consultation    Referring Provider: Kelby Dias CNP. Chief Complaint   Patient presents with    Follow-Up from Hospital      SUBJECTIVE: Ms. Dinorah Almanza has a PMH of HTN, HLD, DM II, CKD, GERD. He is here for hospital follow up S/P left heart cath; feels great today, only complains of heavy perspiration. History of Present Illness:   Elena Maier is a 80 y.o. female who is here today for PMH NOCAD dx 12/21, HTN, HLD, borderline DM, Left carotid disease, and GERD. He was initially seen as a new at the request of Dr. Flores for fatigue, CAMILO, and abnormal EKG in 11/21. EKG 10/20/2021 SB, ST abnormality c/w possible anterolateral Ischemia, 56 BPM (ECG 3/2013 NO ST changes). Most recent carotid bilateral doppler 10/20/2021 showed Right carotid <50% and Left carotid 50-69% stenoses. Of note, most recent GXT myoview 8/26/2010 that was negative for ischemia. Most recent ECHO 12/8/2021 shows an RF of 62%-  Frequent ventricular ectopy noted throughout the exam. Due to ongoing CAMILO and concern for anginal equivalent, multiple CAD RF's, and abnormal T inversion on EKG concerning for ischemia a left heart cath was scheduled. Today she is here for hospital follow up for S/P LHC with Dr. Marina Rahmanly on 12/8/2021 which showed NOCAD (20% and 40-50% LAD, 30% CCx, 30% RCA stenoses). She states she has been feeling great since her last visit. Only complaint is heavy perspiration. She is tolerating her medications and taking them as prescribed. Blood pressure today better controlled after starting Norvasc. Patient currently denies any weight gain, edema, palpitations, chest pain, shortness of breath, dizziness, and syncope.     Past Medical History:   has a past medical history of Anal fissure, Back pain, Carotid stenosis, Chronic insomnia, Chronic kidney disease, stage III (moderate) ([address] Depression, Diabetic retinopathy of both eyes ([address] DM type 2 with diabetic dyslipidemia ([address] GERD (gastroesophageal reflux disease), Glossitis, Hearing loss, Hypercholesteremia, Hypertension, Lumbar radiculopathy, Osteopenia, and Patellofemoral stress syndrome of right knee. Surgical History:   has a past surgical history that includes Cholecystectomy; Appendectomy; knee surgery (left knee); Colonoscopy (1/18/2016); Upper gastrointestinal endoscopy (1/18/2016); Cholecystectomy; Hysterectomy, total abdominal; and Ovary removal.     Social History:   reports that she has never smoked. She has never used smokeless tobacco. She reports current alcohol use. She reports that she does not use drugs. Family History:  family history includes Heart Disease in her brother; High Blood Pressure in an other family member; Stroke in her father. Home Medications:  Prior to Admission medications    Medication Sig Start Date End Date Taking?  Authorizing Provider   PARoxetine (PAXIL) 10 MG tablet Take 10 mg by mouth every morning   Yes Historical Provider, MD   Multiple Vitamins-Minerals (THERAPEUTIC MULTIVITAMIN-MINERALS) tablet Take 1 tablet by mouth daily   Yes Historical Provider, MD   losartan (COZAAR) 50 MG tablet TAKE ONE TABLET BY MOUTH DAILY 11/28/21  Yes Eli Edwards MD   metoprolol succinate (TOPROL XL) 100 MG extended release tablet TAKE ONE TABLET BY MOUTH ONCE NIGHTLY 11/22/21  Yes Eli Edwards MD   montelukast (SINGULAIR) 10 MG tablet TAKE ONE TABLET BY MOUTH ONCE NIGHTLY 11/19/21  Yes Eli Edwards MD   amLODIPine (NORVASC) 5 MG tablet Take 1 tablet by mouth daily 11/11/21  Yes Erendira Marks MD   Omeprazole Magnesium (PRILOSEC OTC PO) Take by mouth   Yes Historical Provider, MD   Blood Pressure KIT 1 kit by Does not apply route daily 10/20/21  Yes MARTINE Hoover CNP   triamterene-hydroCHLOROthiazide (OEBMCCL-42) 37.5-25 MG per tablet TAKE ONE TABLET BY MOUTH DAILY 10/6/21  Yes MARTINE Carter CNP   atorvastatin (LIPITOR) 40 MG tablet TAKE ONE TABLET BY MOUTH DAILY 8/18/21  Yes Eve Canales, APRN - CNP   fenofibrate (TRIGLIDE) 160 MG tablet TAKE ONE TABLET BY MOUTH DAILY 4/20/21  Yes Alisha Johnson MD   Probiotic Product (PROBIOTIC DAILY PO) Take by mouth   Yes Historical Provider, MD   aspirin 81 MG EC tablet Take 81 mg by mouth every evening. 12/31/10  Yes Historical Provider, MD   fluconazole (DIFLUCAN) 100 MG tablet  1/3/22   Historical Provider, MD   estradiol (ESTRACE VAGINAL) 0.1 MG/GM vaginal cream Place 2 g vaginally daily for 14 days, THEN 2 g Twice a Week for 16 days. Patient not taking: Reported on 2/1/2022 1/3/22 2/2/22  Alisha Johnson MD        Allergies:  Adhesive tape, Codeine, Iodides, Sulfa antibiotics, and Penicillins     Review of Systems:   · Constitutional: there has been no unanticipated weight loss. There's been no change in energy level, sleep pattern, or activity level. · Eyes: No visual changes or diplopia. No scleral icterus. · ENT: No Headaches, hearing loss or vertigo. No mouth sores or sore throat. · Cardiovascular: Reviewed in HPI  · Respiratory: No cough or wheezing, no sputum production. No hematemesis. · Gastrointestinal: No abdominal pain, appetite loss, blood in stools. No change in bowel or bladder habits. · Genitourinary: No dysuria, trouble voiding, or hematuria. · Musculoskeletal:  No gait disturbance, weakness or joint complaints. · Integumentary: No rash or pruritis. · Neurological: No headache, diplopia, change in muscle strength, numbness or tingling. No change in gait, balance, coordination, mood, affect, memory, mentation, behavior. · Psychiatric: No anxiety, no depression. · Endocrine: No malaise, fatigue or temperature intolerance. No excessive thirst, fluid intake, or urination. No tremor. · Hematologic/Lymphatic: No abnormal bruising or bleeding, blood clots or swollen lymph nodes. · Allergic/Immunologic: No nasal congestion or hives.     Physical Examination:    /60   Pulse 59   Ht 5' 2.5\" (1.588 m)   Wt 131 lb 9.6 oz (59.7 kg)   SpO2 97%   BMI 23.69 kg/m² \\        Constitutional and General Appearance: NAD   Respiratory:  · Normal excursion and expansion without use of accessory muscles  · Resp Auscultation: Normal breath sounds without dullness  Cardiovascular:  · The apical impulses not displaced  · Heart tones are crisp and normal  · Cervical veins are not engorged  · +left carotid bruit   · Normal S1S2, No S3, +soft SHELLI   · Peripheral pulses are symmetrical and full  · There is no clubbing, cyanosis of the extremities. · No edema  · Femoral Arteries: 2+ and equal  · Pedal Pulses: 2+ and equal   Abdomen:  · No masses or tenderness  · Liver/Spleen: No Abnormalities Noted  Neurological/Psychiatric:  · Alert and oriented in all spheres  · Moves all extremities well  · Exhibits normal gait balance and coordination  · No abnormalities of mood, affect, memory, mentation, or behavior are noted  Skin:  · Skin: warm and dry. Lab Results   Component Value Date    CHOL 164 12/08/2021    CHOL 159 03/09/2021    CHOL 153 06/26/2020     Lab Results   Component Value Date    TRIG 309 (H) 12/08/2021    TRIG 286 (H) 03/09/2021    TRIG 374 (H) 06/26/2020     Lab Results   Component Value Date    HDL 29 (L) 12/08/2021    HDL 25 (L) 03/09/2021    HDL 27 (L) 06/26/2020     Lab Results   Component Value Date    LDLCALC see below 12/08/2021    [address] 03/09/2021    LDLCALC see below 06/26/2020     Lab Results   Component Value Date    LABVLDL see below 12/08/2021    LABVLDL 57 03/09/2021    LABVLDL see below 06/26/2020     No results found for: CHOLHDLRATIO    Assessment:     1. Mixed hyperlipidemia: I personally reviewed most recent lipids from 12/8//21 in Epic (see above). Note elevated TG and low HDL. Need to recheck and adjust accordingly. Continue lipitor 40mg qd and fenofibrate 160mg daily for now. Will recheck FLP 1 month and if TG still elevated will add lovaza or vascepa. Counseled her to watch diet as best possible. 2. Primary hypertension: Improved and adequately controlled and will continue current medical regimen. 3. Stenosis of left carotid artery: Most recent carotid bilateral doppler 10/20/2021 showed Right carotid <50% and Left carotid 50-69% stenoses. No neurologic symptoms and will need repeat doppler US in 6 months  April 2022. 4.      Abnormal EKG:  Most recent ECG 10/20/2021 SB, ST abnormality c/w possible anterolateral Ischemia, 56  BPM (ECG 3/2013 NO ST changes). Repeat EKG 12/21 NSR, PVC's; NST changes (prior T inversion  resolved). 5.      Coronary artery disease--[facility] with Dr. Varghese Sarah on 12/8/2021 which showed NOCAD (20% and 40-50% LAD,   30% CCx, 30% RCA stenoses). Plan:  1. Continue current medications- refills sent   2. You do have blockages in your heart arteries that we will treat with medication therapy. 3. Recheck carotid dopplers in April of this year   4. Recheck fasting lipids and LFT's next month. Will consider adding Vascepa or Lovaza if needed   5. Follow up with me in 6 months     Scribe's attestation: This note was scribed in the presence of Dr. Christ aGrrett MD By Veronique Browne RN    Cost of prescription medications and patient compliance have been reviewed with patient. All questions answered. Thank you for allowing me to participate in the care of this individual.    Taiwo Rabago.  Floridalma Chambers M.D., [facility]

## 2022-02-09 ENCOUNTER — HOSPITAL ENCOUNTER (OUTPATIENT)
Dept: VASCULAR LAB | Age: 82
Discharge: HOME | End: 2022-02-09
Payer: MEDICARE

## 2022-02-09 DIAGNOSIS — I65.23 BILATERAL CAROTID ARTERY STENOSIS: ICD-10-CM

## 2022-02-09 PROCEDURE — 93880 EXTRACRANIAL BILAT STUDY: CPT

## 2022-03-08 ENCOUNTER — HOSPITAL ENCOUNTER (OUTPATIENT)
Age: 82
Setting detail: OBSERVATION
Discharge: HOME | End: 2022-03-10
Attending: EMERGENCY MEDICINE | Admitting: INTERNAL MEDICINE
Payer: MEDICARE

## 2022-03-08 ENCOUNTER — APPOINTMENT (OUTPATIENT)
Dept: GENERAL RADIOLOGY | Age: 82
End: 2022-03-08
Payer: MEDICARE

## 2022-03-08 ENCOUNTER — APPOINTMENT (OUTPATIENT)
Dept: CT IMAGING | Age: 82
End: 2022-03-08
Payer: MEDICARE

## 2022-03-08 DIAGNOSIS — N17.9 AKI (ACUTE KIDNEY INJURY) (HCC): Primary | ICD-10-CM

## 2022-03-08 DIAGNOSIS — K56.41 FECAL IMPACTION (HCC): ICD-10-CM

## 2022-03-08 PROBLEM — N18.9 ACUTE KIDNEY INJURY SUPERIMPOSED ON CKD (HCC): Status: ACTIVE | Noted: 2022-03-08

## 2022-03-08 LAB
A/G RATIO: 2.4 (ref 1.1–2.2)
ALBUMIN SERPL-MCNC: 4.7 G/DL (ref 3.4–5)
ALP BLD-CCNC: 54 U/L (ref 40–129)
ALT SERPL-CCNC: 21 U/L (ref 10–40)
ANION GAP SERPL CALCULATED.3IONS-SCNC: 14 MMOL/L (ref 3–16)
AST SERPL-CCNC: 28 U/L (ref 15–37)
BASOPHILS ABSOLUTE: 0.1 K/UL (ref 0–0.2)
BASOPHILS RELATIVE PERCENT: 1.4 %
BILIRUB SERPL-MCNC: 0.5 MG/DL (ref 0–1)
BUN BLDV-MCNC: 36 MG/DL (ref 7–20)
CALCIUM SERPL-MCNC: 10.2 MG/DL (ref 8.3–10.6)
CHLORIDE BLD-SCNC: 99 MMOL/L (ref 99–110)
CO2: 26 MMOL/L (ref 21–32)
CREAT SERPL-MCNC: 1.7 MG/DL (ref 0.6–1.2)
EOSINOPHILS ABSOLUTE: 0.1 K/UL (ref 0–0.6)
EOSINOPHILS RELATIVE PERCENT: 2.8 %
GFR AFRICAN AMERICAN: 35
GFR NON-AFRICAN AMERICAN: 29
GLUCOSE BLD-MCNC: 154 MG/DL (ref 70–99)
GLUCOSE BLD-MCNC: 167 MG/DL (ref 70–99)
GLUCOSE BLD-MCNC: 220 MG/DL (ref 70–99)
HCT VFR BLD CALC: 39.8 % (ref 36–48)
HEMOGLOBIN: 13.6 G/DL (ref 12–16)
LIPASE: 53 U/L (ref 13–60)
LYMPHOCYTES ABSOLUTE: 1.1 K/UL (ref 1–5.1)
LYMPHOCYTES RELATIVE PERCENT: 23.4 %
MCH RBC QN AUTO: 29.9 PG (ref 26–34)
MCHC RBC AUTO-ENTMCNC: 34.2 G/DL (ref 31–36)
MCV RBC AUTO: 87.5 FL (ref 80–100)
MONOCYTES ABSOLUTE: 0.2 K/UL (ref 0–1.3)
MONOCYTES RELATIVE PERCENT: 4 %
NEUTROPHILS ABSOLUTE: 3.1 K/UL (ref 1.7–7.7)
NEUTROPHILS RELATIVE PERCENT: 68.4 %
PDW BLD-RTO: 13.9 % (ref 12.4–15.4)
PERFORMED ON: ABNORMAL
PERFORMED ON: ABNORMAL
PLATELET # BLD: 259 K/UL (ref 135–450)
PMV BLD AUTO: 6.7 FL (ref 5–10.5)
POTASSIUM REFLEX MAGNESIUM: 3.6 MMOL/L (ref 3.5–5.1)
RBC # BLD: 4.54 M/UL (ref 4–5.2)
SODIUM BLD-SCNC: 139 MMOL/L (ref 136–145)
TOTAL PROTEIN: 6.7 G/DL (ref 6.4–8.2)
WBC # BLD: 4.6 K/UL (ref 4–11)

## 2022-03-08 PROCEDURE — 6370000000 HC RX 637 (ALT 250 FOR IP): Performed by: PHYSICIAN ASSISTANT

## 2022-03-08 PROCEDURE — 2580000003 HC RX 258: Performed by: PHYSICIAN ASSISTANT

## 2022-03-08 PROCEDURE — 74018 RADEX ABDOMEN 1 VIEW: CPT

## 2022-03-08 PROCEDURE — G0378 HOSPITAL OBSERVATION PER HR: HCPCS

## 2022-03-08 PROCEDURE — 74176 CT ABD & PELVIS W/O CONTRAST: CPT

## 2022-03-08 PROCEDURE — 99284 EMERGENCY DEPT VISIT MOD MDM: CPT

## 2022-03-08 PROCEDURE — 6370000000 HC RX 637 (ALT 250 FOR IP): Performed by: INTERNAL MEDICINE

## 2022-03-08 PROCEDURE — 96361 HYDRATE IV INFUSION ADD-ON: CPT

## 2022-03-08 PROCEDURE — 1200000000 HC SEMI PRIVATE

## 2022-03-08 PROCEDURE — 80053 COMPREHEN METABOLIC PANEL: CPT

## 2022-03-08 PROCEDURE — 83690 ASSAY OF LIPASE: CPT

## 2022-03-08 PROCEDURE — 2580000003 HC RX 258: Performed by: INTERNAL MEDICINE

## 2022-03-08 PROCEDURE — 85025 COMPLETE CBC W/AUTO DIFF WBC: CPT

## 2022-03-08 RX ORDER — SODIUM CHLORIDE 9 MG/ML
INJECTION, SOLUTION INTRAVENOUS CONTINUOUS
Status: DISCONTINUED | OUTPATIENT
Start: 2022-03-08 | End: 2022-03-10

## 2022-03-08 RX ORDER — METOPROLOL SUCCINATE 50 MG/1
100 TABLET, EXTENDED RELEASE ORAL NIGHTLY
Status: DISCONTINUED | OUTPATIENT
Start: 2022-03-08 | End: 2022-03-10 | Stop reason: HOSPADM

## 2022-03-08 RX ORDER — LIDOCAINE AND PRILOCAINE 25; 25 MG/G; MG/G
CREAM TOPICAL ONCE
Status: COMPLETED | OUTPATIENT
Start: 2022-03-08 | End: 2022-03-08

## 2022-03-08 RX ORDER — MONTELUKAST SODIUM 10 MG/1
10 TABLET ORAL NIGHTLY
Status: DISCONTINUED | OUTPATIENT
Start: 2022-03-08 | End: 2022-03-10 | Stop reason: HOSPADM

## 2022-03-08 RX ORDER — NICOTINE POLACRILEX 4 MG
15 LOZENGE BUCCAL PRN
Status: DISCONTINUED | OUTPATIENT
Start: 2022-03-08 | End: 2022-03-10 | Stop reason: HOSPADM

## 2022-03-08 RX ORDER — POLYETHYLENE GLYCOL 3350 17 G/17G
17 POWDER, FOR SOLUTION ORAL DAILY
Status: DISCONTINUED | OUTPATIENT
Start: 2022-03-08 | End: 2022-03-08

## 2022-03-08 RX ORDER — SODIUM CHLORIDE 9 MG/ML
25 INJECTION, SOLUTION INTRAVENOUS PRN
Status: DISCONTINUED | OUTPATIENT
Start: 2022-03-08 | End: 2022-03-10 | Stop reason: HOSPADM

## 2022-03-08 RX ORDER — PAROXETINE HYDROCHLORIDE 20 MG/1
10 TABLET, FILM COATED ORAL EVERY MORNING
Status: DISCONTINUED | OUTPATIENT
Start: 2022-03-09 | End: 2022-03-10 | Stop reason: HOSPADM

## 2022-03-08 RX ORDER — HEPARIN SODIUM 5000 [USP'U]/ML
5000 INJECTION, SOLUTION INTRAVENOUS; SUBCUTANEOUS EVERY 8 HOURS SCHEDULED
Status: CANCELLED | OUTPATIENT
Start: 2022-03-08

## 2022-03-08 RX ORDER — AMLODIPINE BESYLATE 5 MG/1
5 TABLET ORAL DAILY
Status: DISCONTINUED | OUTPATIENT
Start: 2022-03-09 | End: 2022-03-10 | Stop reason: HOSPADM

## 2022-03-08 RX ORDER — ATORVASTATIN CALCIUM 40 MG/1
40 TABLET, FILM COATED ORAL DAILY
Status: DISCONTINUED | OUTPATIENT
Start: 2022-03-09 | End: 2022-03-10 | Stop reason: HOSPADM

## 2022-03-08 RX ORDER — 0.9 % SODIUM CHLORIDE 0.9 %
1000 INTRAVENOUS SOLUTION INTRAVENOUS ONCE
Status: COMPLETED | OUTPATIENT
Start: 2022-03-08 | End: 2022-03-08

## 2022-03-08 RX ORDER — ACETAMINOPHEN 650 MG/1
650 SUPPOSITORY RECTAL EVERY 6 HOURS PRN
Status: DISCONTINUED | OUTPATIENT
Start: 2022-03-08 | End: 2022-03-10 | Stop reason: HOSPADM

## 2022-03-08 RX ORDER — POLYETHYLENE GLYCOL 3350 17 G/17G
17 POWDER, FOR SOLUTION ORAL DAILY PRN
Status: DISCONTINUED | OUTPATIENT
Start: 2022-03-08 | End: 2022-03-10 | Stop reason: HOSPADM

## 2022-03-08 RX ORDER — POLYETHYLENE GLYCOL 3350 17 G/17G
17 POWDER, FOR SOLUTION ORAL 2 TIMES DAILY
Status: DISCONTINUED | OUTPATIENT
Start: 2022-03-08 | End: 2022-03-10 | Stop reason: HOSPADM

## 2022-03-08 RX ORDER — ONDANSETRON 2 MG/ML
4 INJECTION INTRAMUSCULAR; INTRAVENOUS EVERY 6 HOURS PRN
Status: DISCONTINUED | OUTPATIENT
Start: 2022-03-08 | End: 2022-03-10 | Stop reason: HOSPADM

## 2022-03-08 RX ORDER — DEXTROSE MONOHYDRATE 25 G/50ML
12.5 INJECTION, SOLUTION INTRAVENOUS PRN
Status: DISCONTINUED | OUTPATIENT
Start: 2022-03-08 | End: 2022-03-08 | Stop reason: ALTCHOICE

## 2022-03-08 RX ORDER — DEXTROSE MONOHYDRATE 50 MG/ML
100 INJECTION, SOLUTION INTRAVENOUS PRN
Status: DISCONTINUED | OUTPATIENT
Start: 2022-03-08 | End: 2022-03-10 | Stop reason: HOSPADM

## 2022-03-08 RX ORDER — ONDANSETRON 4 MG/1
4 TABLET, ORALLY DISINTEGRATING ORAL EVERY 8 HOURS PRN
Status: DISCONTINUED | OUTPATIENT
Start: 2022-03-08 | End: 2022-03-10 | Stop reason: HOSPADM

## 2022-03-08 RX ORDER — LANOLIN ALCOHOL/MO/W.PET/CERES
3 CREAM (GRAM) TOPICAL NIGHTLY PRN
Status: DISCONTINUED | OUTPATIENT
Start: 2022-03-08 | End: 2022-03-10 | Stop reason: HOSPADM

## 2022-03-08 RX ORDER — SODIUM CHLORIDE 0.9 % (FLUSH) 0.9 %
5-40 SYRINGE (ML) INJECTION EVERY 12 HOURS SCHEDULED
Status: DISCONTINUED | OUTPATIENT
Start: 2022-03-08 | End: 2022-03-10 | Stop reason: HOSPADM

## 2022-03-08 RX ORDER — LIDOCAINE AND PRILOCAINE 25; 25 MG/G; MG/G
CREAM TOPICAL ONCE
Status: DISCONTINUED | OUTPATIENT
Start: 2022-03-08 | End: 2022-03-10 | Stop reason: HOSPADM

## 2022-03-08 RX ORDER — SODIUM CHLORIDE 0.9 % (FLUSH) 0.9 %
5-40 SYRINGE (ML) INJECTION PRN
Status: DISCONTINUED | OUTPATIENT
Start: 2022-03-08 | End: 2022-03-10 | Stop reason: HOSPADM

## 2022-03-08 RX ORDER — ACETAMINOPHEN 325 MG/1
650 TABLET ORAL EVERY 6 HOURS PRN
Status: DISCONTINUED | OUTPATIENT
Start: 2022-03-08 | End: 2022-03-10 | Stop reason: HOSPADM

## 2022-03-08 RX ADMIN — MONTELUKAST 10 MG: 10 TABLET, FILM COATED ORAL at 20:18

## 2022-03-08 RX ADMIN — Medication 330 ML: at 21:49

## 2022-03-08 RX ADMIN — POLYETHYLENE GLYCOL 3350 17 G: 17 POWDER, FOR SOLUTION ORAL at 22:25

## 2022-03-08 RX ADMIN — POLYETHYLENE GLYCOL 3350 17 G: 17 POWDER, FOR SOLUTION ORAL at 17:43

## 2022-03-08 RX ADMIN — METOPROLOL SUCCINATE 100 MG: 50 TABLET, EXTENDED RELEASE ORAL at 20:18

## 2022-03-08 RX ADMIN — MAGNESIUM HYDROXIDE 45 ML: 2400 SUSPENSION ORAL at 21:14

## 2022-03-08 RX ADMIN — INSULIN LISPRO 1 UNITS: 100 INJECTION, SOLUTION INTRAVENOUS; SUBCUTANEOUS at 20:20

## 2022-03-08 RX ADMIN — ACETAMINOPHEN 650 MG: 325 TABLET ORAL at 20:18

## 2022-03-08 RX ADMIN — LIDOCAINE AND PRILOCAINE: 25; 25 CREAM TOPICAL at 13:39

## 2022-03-08 RX ADMIN — SODIUM CHLORIDE: 9 INJECTION, SOLUTION INTRAVENOUS at 17:39

## 2022-03-08 RX ADMIN — SODIUM CHLORIDE 1000 ML: 9 INJECTION, SOLUTION INTRAVENOUS at 15:28

## 2022-03-08 RX ADMIN — Medication 3 MG: at 22:19

## 2022-03-08 ASSESSMENT — PAIN DESCRIPTION - FREQUENCY
FREQUENCY: INTERMITTENT
FREQUENCY: CONTINUOUS

## 2022-03-08 ASSESSMENT — PAIN DESCRIPTION - LOCATION: LOCATION: RECTUM

## 2022-03-08 ASSESSMENT — PAIN SCALES - GENERAL
PAINLEVEL_OUTOF10: 3
PAINLEVEL_OUTOF10: 7
PAINLEVEL_OUTOF10: 7
PAINLEVEL_OUTOF10: 6
PAINLEVEL_OUTOF10: 10

## 2022-03-08 ASSESSMENT — PAIN DESCRIPTION - PAIN TYPE: TYPE: ACUTE PAIN

## 2022-03-08 ASSESSMENT — PAIN DESCRIPTION - DESCRIPTORS: DESCRIPTORS: STABBING

## 2022-03-08 NOTE — ED PROVIDER NOTES
[facility]  ED  EMERGENCY DEPARTMENT ENCOUNTER        Pt Name: Simran Humphrey  MRN: [id number]  [place] 1940  Date of evaluation: 3/8/2022  Provider: Mary Hart PA-C  PCP: Renée Mendez MD  Note Started: 12:57 PM EST        I have seen and evaluated this patient with my supervising physician Kylie Woodson MD.    [address] [place] [address]       Chief Complaint   Patient presents with    Fecal Impaction     per pt went a little bit this morning/noticed blood mixed with stool/       HISTORY OF PRESENT ILLNESS   (Location, Timing/Onset, Context/Setting, Quality, Duration, Modifying Factors, Severity, Associated Signs and Symptoms)  Note limiting factors. Chief Complaint: Abdominal pain, constipation    Simran Humphrey is a [de-identified] y.o. female who presents onset 2-3 days ago with progression. Patient had normal BMs throughout last week. On Friday she had several loose stool with some blood. The patient presenting today with constipation over the past 48 hours. She indicates some blood noted with straining. Patient's history of hemorrhoid as well as anal fissure. She has had surgery x2 for anal fissure. She has been using MiraLAX without adequate results thus far. She presents for evaluation of constipation/fecal impaction/abdominal pain. No chest pain, shortness of breath or urinary symptoms. Nursing Notes were all reviewed and agreed with or any disagreements were addressed in the HPI. REVIEW OF SYSTEMS    (2-9 systems for level 4, 10 or more for level 5)     Review of Systems    Positives and Pertinent negatives as per HPI. Except as noted above in the ROS, all other systems were reviewed and negative.        PAST MEDICAL HISTORY     Past Medical History:   Diagnosis Date    Anal fissure 01/07/2013    Back pain     Carotid stenosis 11/11/2021    Chronic insomnia     Chronic kidney disease, stage III (moderate) ([facility] 5/17/2017    Depression     [facility]opathy [facility]     DM type 2 with diabetic dyslipidemia ([facility] 2/5/2019    GERD (gastroesophageal reflux disease)     Dr. Bartolo Sheffield (GI)    Glossitis 03/16/2017    Hearing loss     Hypercholesteremia     Hypertension     Lumbar radiculopathy 03/03/2017    Osteopenia 1/29/2019    Patellofemoral stress syndrome of right knee 03/03/2017         SURGICAL HISTORY     Past Surgical History:   Procedure Laterality Date    APPENDECTOMY      CHOLECYSTECTOMY      CHOLECYSTECTOMY      COLONOSCOPY  1/18/2016    Normal    HYSTERECTOMY, TOTAL ABDOMINAL      KNEE SURGERY  left knee    OVARY REMOVAL      unknown which side    UPPER GASTROINTESTINAL ENDOSCOPY  1/18/2016    Normal         CURRENTMEDICATIONS       Previous Medications    AMLODIPINE (NORVASC) 5 MG TABLET    Take 1 tablet by mouth daily    ASPIRIN 81 MG EC TABLET    Take 81 mg by mouth every evening. ATORVASTATIN (LIPITOR) 40 MG TABLET    TAKE ONE TABLET BY MOUTH DAILY    BLOOD PRESSURE KIT    1 kit by Does not apply route daily    ESTRADIOL (ESTRACE VAGINAL) 0.1 MG/GM VAGINAL CREAM    Place 2 g vaginally daily for 14 days, THEN 2 g Twice a Week for 16 days.     FENOFIBRATE (TRIGLIDE) 160 MG TABLET    TAKE ONE TABLET BY MOUTH DAILY    FLUCONAZOLE (DIFLUCAN) 100 MG TABLET        LOSARTAN (COZAAR) 50 MG TABLET    TAKE ONE TABLET BY MOUTH DAILY    METOPROLOL SUCCINATE (TOPROL XL) 100 MG EXTENDED RELEASE TABLET    TAKE ONE TABLET BY MOUTH ONCE NIGHTLY    MONTELUKAST (SINGULAIR) 10 MG TABLET    TAKE ONE TABLET BY MOUTH ONCE NIGHTLY    MULTIPLE VITAMINS-MINERALS (THERAPEUTIC MULTIVITAMIN-MINERALS) TABLET    Take 1 tablet by mouth daily    OMEPRAZOLE MAGNESIUM (PRILOSEC OTC PO)    Take by mouth    PAROXETINE (PAXIL) 10 MG TABLET    Take 10 mg by mouth every morning    PROBIOTIC PRODUCT (PROBIOTIC DAILY PO)    Take by mouth    TRIAMTERENE-HYDROCHLOROTHIAZIDE (MAXZIDE-25) 37.5-25 MG PER TABLET    TAKE ONE TABLET BY MOUTH DAILY         ALLERGIES     Adhesive tape, Codeine, Iodides, Sulfa antibiotics, and Penicillins    FAMILYHISTORY       Family History   Problem Relation Age of Onset    Stroke Father     Heart Disease Brother     High Blood Pressure Other           SOCIAL HISTORY       Social History     Tobacco Use    Smoking status: Never Smoker    Smokeless tobacco: Never Used   Substance Use Topics    Alcohol use: Yes     Comment: occassionally    Drug use: No       SCREENINGS    [place] Coma Scale  Eye Opening: Spontaneous  Best Verbal Response: Oriented  Best Motor Response: Obeys commands  [place] Coma Scale Score: 15        PHYSICAL EXAM    (up to 7 for level 4, 8 or more for level 5)     ED Triage Vitals [03/08/22 1242]   BP Temp Temp src Pulse Resp SpO2 Height Weight   (!) 178/63 -- -- 64 16 97 % 5' 2\" (1.575 m) 125 lb (56.7 kg)       Physical Exam  Vitals and nursing note reviewed. Constitutional:       Appearance: Normal appearance. She is well-developed and normal weight. HENT:      Head: Normocephalic and atraumatic. Right Ear: External ear normal.      Left Ear: External ear normal.   Eyes:      General: No scleral icterus. Right eye: No discharge. Left eye: No discharge. Conjunctiva/sclera: Conjunctivae normal.   Cardiovascular:      Rate and Rhythm: Normal rate and regular rhythm. Heart sounds: Normal heart sounds. Pulmonary:      Effort: Pulmonary effort is normal.      Breath sounds: Normal breath sounds. Genitourinary:     Comments: With nursing present in room a CHICHI performed. Very painful. Abandon the effort. External hemorrhoids noted. Blood noted with small stool on exam finger. Musculoskeletal:         General: Normal range of motion. Cervical back: Normal range of motion and neck supple. Skin:     General: Skin is warm and dry. Neurological:      General: No focal deficit present. Mental Status: She is alert and oriented to person, place, and time. Mental status is at baseline. Psychiatric:         Mood and Affect: Mood normal.         Behavior: Behavior normal.         Thought Content: Thought content normal.         Judgment: Judgment normal.         DIAGNOSTIC RESULTS   LABS:    Labs Reviewed   COMPREHENSIVE METABOLIC PANEL W/ REFLEX TO MG FOR LOW K - Abnormal; Notable for the following components:       Result Value    Glucose 220 (*)     BUN 36 (*)     CREATININE 1.7 (*)     GFR Non- 29 (*)     GFR African American 35 (*)     Albumin/Globulin Ratio 2.4 (*)     All other components within normal limits   CBC WITH AUTO DIFFERENTIAL   LIPASE       When ordered only abnormal lab results are displayed. All other labs were within normal range or not returned as of this dictation. EKG: When ordered, EKG's are interpreted by the Emergency Department Physician in the absence of a cardiologist.  Please see their note for interpretation of EKG. RADIOLOGY:   Non-plain film images such as CT, Ultrasound and MRI are read by the radiologist. Plain radiographic images are visualized and preliminarily interpreted by the ED Provider with the below findings:        Interpretation per the Radiologist below, if available at the time of this note:    CT ABDOMEN PELVIS WO CONTRAST Additional Contrast? None   Final Result   Significant amount of stool in the rectum, otherwise, no significant stool   burden seen in the colon      Status post cholecystectomy      Otherwise, no acute abnormalities seen in the abdomen or pelvis         XR ABDOMEN (KUB) (SINGLE AP VIEW)   Final Result   Significant stool burden in the rectum           No results found. PROCEDURES     Patient with significant external hemorrhoid and anal pain. I did use EMLA cream to help control pain. Patient had some liquid stool coming around the impaction. I was not able to physically disimpact this patient due to her pain level.      Procedures    CRITICAL CARE TIME     Critical Care  There was a high probability of life-threatening clinical deterioration in the patient's condition requiring my urgent intervention. Total critical care time with the patient was 35 minutes excluding separately reportable procedures. Critical care required due to patients finding of significant fecal impaction and inability to disimpact with associated severe anorectal pain. Time spent attempting disimpaction as well as discussing case with attending physician, GI physician and hospitalist.      CONSULTS:  IP CONSULT TO GI  IP CONSULT TO HOSPITALIST      EMERGENCY DEPARTMENT COURSE and DIFFERENTIAL DIAGNOSIS/MDM:   Vitals:    Vitals:    03/08/22 1300 03/08/22 1330 03/08/22 1343 03/08/22 1500   BP:  (!) 153/59  (!) 139/54   Pulse:  59 71 61   Resp:  24 24 9   Temp: 98.1 °F (36.7 °C)      TempSrc: Oral      SpO2:  95% 98% 97%   Weight:       Height:           Patient was given the following medications:  Medications   lidocaine-prilocaine (EMLA) cream (has no administration in time range)   0.9 % sodium chloride bolus (has no administration in time range)   lidocaine-prilocaine (EMLA) cream ( Topical Given by Other 3/8/22 1339)         At 1:40 PM EMLA cream available at bedside. Gentle insertion. Patient having stool at same time. Patient pain improves. Bedside commode in room and patient now on bedside commode. Patient not able to pass fecal impaction stool ball. I did speak with Dr. Raoul Bedolla. He did recommend admission for their evaluation. The patient CT scan showing significant amount of stool in the rectum. On exam she has a large ball which is not at our ability to reach and disimpacted. GI will admit and perform a disimpaction. No other concerning pathology on the CT scan without IV contrast.  The patient with history of anal fissure x2 with surgery as well as external hemorrhoids. Laboratory studies also showing a mild JELANI with a BUN 36, creatinine 1.7 and GFR 29.   This is below her previous renal function studies. NaCl 254-hour initiated. Case discussed with attending who recommends admission. At 3:04 PM medicine perfect serve note to the hospitalist.      FINAL IMPRESSION      1. JELANI (acute kidney injury) ([facility] [address]    2. Fecal impaction ([facility] [address]          DISPOSITION/PLAN   DISPOSITION        PATIENT REFERRED TO:  No follow-up provider specified. DISCHARGE MEDICATIONS:  New Prescriptions    No medications on file       DISCONTINUED MEDICATIONS:  Discontinued Medications    No medications on file              (Please note that portions of this note were completed with a voice recognition program.  Efforts were made to edit the dictations but occasionally words are mis-transcribed. )    Carlyn Martinez PA-C (electronically signed)           Carlyn Martinez PA-C  03/08/22 0580

## 2022-03-08 NOTE — PROGRESS NOTES
PS Dr. Joie Perera, \"Pt here with fecal impaction and is crying in pain. Rates pain 9/10, stabbing pain in rectum that comes out of no where. No PRNs ordered. Also pt said No to defibrillation and intubation and yes to meds and compressions, can you change code status? Thank you. \"

## 2022-03-08 NOTE — ED PROVIDER NOTES
I independently performed a history and physical on Lucinda Seo. All diagnostic, treatment, and disposition decisions were made by myself in conjunction with the advanced practice provider. Patient with JELANI and fecal impaction which was refractory to attempted bedside digital disimpaction in the ER. Case discussed with gastroenterology and patient will be admitted to the hospitalist service. For further details of Nadine Orellana emergency department encounter, please see Cyrus TREVINO's documentation.              Slava Gandhi MD  03/08/22 5108

## 2022-03-08 NOTE — ED NOTES
[address] called [facility] per consult  Re: fecal impaction  1452 - Dr Iris Mcknight called back to speak with URIEL Roman  03/08/22 7921

## 2022-03-08 NOTE — PROGRESS NOTES
Admission and assessment complete and charted. Pt rates pain 7/10 in rectum and describes it as stabbing and comes and goes. Rectum has blanching redness, external hemorrhoids and a fissure. Pt wears readers and LYLE hearing aides but left them at home. BS active and abd soft. Pt denied n/v/d. Last BM Friday. Pt wants to change code status to no defib and no intubation but yes to meds and compressions. Pt verbalized NPO at MN. Oriented pt to room, call light and POC. Pt stable and denied needs when writer left room.

## 2022-03-08 NOTE — ED NOTES
Scant amount of stool passed by patient skin is cleaned, pt is able to lay down again on left side.       Milagro Urbina RN  03/08/22 2145

## 2022-03-08 NOTE — H&P
Hospital Medicine History & Physical      PCP: Nivia Pisano MD    Date of Admission: 3/8/2022    Date of Service: Pt seen/examined on 3/8/2022 and Admitted to Inpatient with expected LOS greater than two midnights due to medical therapy. Chief Complaint: Constipation    History Of Present Illness:   [de-identified] y.o. female who presented to [facility] with above complaints  Patient presenting to the ED today with complaints of constipation and mild bloody stools this morning. Patient reports she had several bouts of diarrhea last Friday and Saturday. Since then has not had any bowel movements. Today morning when she strained she had some bloody stool output. Patient reports history of hemorrhoids and anal fissure, and that she has been having a lot of rectal pain. Denies any nausea or vomiting. Does report problems with constipation in the past.  She has been taking MiraLAX every day. Still no luck with bowel movements since Sunday.     Past Medical History:          Diagnosis Date    Anal fissure 01/07/2013    Back pain     Carotid stenosis 11/11/2021    Chronic insomnia     Chronic kidney disease, stage III (moderate) ([address] 5/17/2017    Depression     Diabetic retinopathy of both eyes ([address]     DM type 2 with diabetic dyslipidemia ([address] 2/5/2019    GERD (gastroesophageal reflux disease)     Dr. Bartolo Sheffield (GI)    Glossitis 03/16/2017    Hearing loss     Hypercholesteremia     Hypertension     Lumbar radiculopathy 03/03/2017    Osteopenia 1/29/2019    Patellofemoral stress syndrome of right knee 03/03/2017       Past Surgical History:          Procedure Laterality Date    APPENDECTOMY      CHOLECYSTECTOMY      CHOLECYSTECTOMY      COLONOSCOPY  1/18/2016    Normal    HYSTERECTOMY, TOTAL ABDOMINAL      KNEE SURGERY  left knee    OVARY REMOVAL      unknown which side    UPPER GASTROINTESTINAL ENDOSCOPY  1/18/2016    Normal       Medications Prior to Admission:      Prior to Admission medications    Medication Sig Start Date End Date Taking? Authorizing Provider   fluconazole (DIFLUCAN) 100 MG tablet  1/3/22  Yes Historical Provider, MD   PARoxetine (PAXIL) 10 MG tablet Take 10 mg by mouth every morning   Yes Historical Provider, MD   losartan (COZAAR) 50 MG tablet TAKE ONE TABLET BY MOUTH DAILY 2/1/22  Yes Alivia Leon MD   metoprolol succinate (TOPROL XL) 100 MG extended release tablet TAKE ONE TABLET BY MOUTH ONCE NIGHTLY 2/1/22  Yes Alivia Leon MD   amLODIPine (NORVASC) 5 MG tablet Take 1 tablet by mouth daily 2/1/22  Yes Alivia Leon MD   atorvastatin (LIPITOR) 40 MG tablet TAKE ONE TABLET BY MOUTH DAILY 2/1/22  Yes Alivia Leon MD   fenofibrate (TRIGLIDE) 160 MG tablet TAKE ONE TABLET BY MOUTH DAILY 2/1/22  Yes Alivia Leon MD   Multiple Vitamins-Minerals (THERAPEUTIC MULTIVITAMIN-MINERALS) tablet Take 1 tablet by mouth daily   Yes Historical Provider, MD   montelukast (SINGULAIR) 10 MG tablet TAKE ONE TABLET BY MOUTH ONCE NIGHTLY 11/19/21  Yes Marva Cornelius MD   Omeprazole Magnesium (PRILOSEC OTC PO) Take by mouth   Yes Historical Provider, MD   Blood Pressure KIT 1 kit by Does not apply route daily 10/20/21  Yes MARTINE Mccurdy CNP   triamterene-hydroCHLOROthiazide (MAXZIDE-25) 37.5-25 MG per tablet TAKE ONE TABLET BY MOUTH DAILY 10/6/21  Yes MARTINE Cruz CNP   Probiotic Product (PROBIOTIC DAILY PO) Take by mouth   Yes Historical Provider, MD   aspirin 81 MG EC tablet Take 81 mg by mouth every evening. 12/31/10  Yes Historical Provider, MD   estradiol (ESTRACE VAGINAL) 0.1 MG/GM vaginal cream Place 2 g vaginally daily for 14 days, THEN 2 g Twice a Week for 16 days. Patient not taking: Reported on 2/1/2022 1/3/22 2/2/22  Marva Cornelius MD       Allergies:  Adhesive tape, Codeine, Iodides, Sulfa antibiotics, and Penicillins    Social History:      The patient currently lives at home    TOBACCO:   reports that she has never smoked.  She has never used smokeless tobacco.  ETOH:   reports current alcohol use. E-Cigarettes/Vaping Use     Questions Responses    E-Cigarette/Vaping Use     Start Date     Passive Exposure     Quit Date     Counseling Given     Comments             Family History:   Positive as follows:        Problem Relation Age of Onset    Stroke Father     Heart Disease Brother     High Blood Pressure Other        REVIEW OF SYSTEMS COMPLETED:   Pertinent positives as noted in the HPI. All other systems reviewed and negative. PHYSICAL EXAM PERFORMED:    BP (!) 150/56   Pulse 65   Temp 98.1 °F (36.7 °C) (Oral)   Resp 16   Ht 5' 2\" (1.575 m)   Wt 125 lb (56.7 kg)   SpO2 97%   BMI 22.86 kg/m²     General appearance:  No apparent distress, appears stated age and cooperative. HEENT:  Normal cephalic, atraumatic without obvious deformity. Pupils equal, round, and reactive to light. Extra ocular muscles intact. Conjunctivae/corneas clear. Neck: Supple, with full range of motion. No jugular venous distention. Trachea midline. Respiratory:  Normal respiratory effort. Clear to auscultation, bilaterally without Rales/Wheezes/Rhonchi. Cardiovascular:  Regular rate and rhythm with normal S1/S2 without murmurs, rubs or gallops. Abdomen: Soft, non-tender, non-distended with normal bowel sounds. Rectal exam-deferred  Musculoskeletal:  No clubbing, cyanosis or edema bilaterally. Full range of motion without deformity. Skin: Skin color, texture, turgor normal.  No rashes or lesions. Neurologic:  Neurovascularly intact without any focal sensory/motor deficits.  Cranial nerves: II-XII intact, grossly non-focal.  Psychiatric:  Alert and oriented, thought content appropriate, normal insight  Capillary Refill: Brisk,3 seconds, normal  Peripheral Pulses: +2 palpable, equal bilaterally       Labs:     Recent Labs     03/08/22  1258   WBC 4.6   HGB 13.6   HCT 39.8        Recent Labs     03/08/22  1258      K 3.6   CL 99   CO2 26   BUN 36*   CREATININE 1.7*   CALCIUM 10.2     Recent Labs     03/08/22  1258   AST 28   ALT 21   BILITOT 0.5   ALKPHOS 54     No results for input(s): INR in the last 72 hours. No results for input(s): Sonam Linton in the last 72 hours. Urinalysis:      Lab Results   Component Value Date    NITRU NEGATIVE 12/31/2010    BLOODU NEG 01/03/2022    BLOODU NEGATIVE 12/31/2010    SPECGRAV 1.020 01/03/2022    SPECGRAV <=1.005 12/31/2010    GLUCOSEU NEG 01/03/2022    GLUCOSEU NEGATIVE 12/31/2010       Radiology:     I reviewed the CT abdomen pelvis and x-ray abdomen        CT ABDOMEN PELVIS WO CONTRAST Additional Contrast? None   Final Result   Significant amount of stool in the rectum, otherwise, no significant stool   burden seen in the colon      Status post cholecystectomy      Otherwise, no acute abnormalities seen in the abdomen or pelvis         XR ABDOMEN (KUB) (SINGLE AP VIEW)   Final Result   Significant stool burden in the rectum             ASSESSMENT:PLAN:    Fecal impaction in rectum   -Digital disimpaction unsuccessful in the ER  -Topical anesthetic cream for pain relief  -Consult GI  -Laxatives ordered     Acute kidney injury superimposed on CKD   CR 1.7, baseline 1.4  IV fluids ordered  Recheck BMP in a.m. Hold nephrotoxic medications -losartan, Maxzide    DM type 2 with diabetic retinopathy  -Sliding scale insulin ordered, hypoglycemia protocol ordered  -Check A1c    HTN-controlled, resume metoprolol and amlodipine    HLD-statin resumed      DVT Prophylaxis: SCD  Diet: No diet orders on file  Code Status: Full code  PT/OT Eval Status: Ambulatory    Dispo -IP stay       Thayne Schilder, MD    Thank you Mike Gaines MD for the opportunity to be involved in this patient's care. If you have any questions or concerns please feel free to contact me at [phone].

## 2022-03-08 NOTE — PROGRESS NOTES
A patient of Dr Cr Leyla request received and appreciated  Full note/eval to follow    Shekhar Rosado MD       O) [phone]

## 2022-03-09 ENCOUNTER — APPOINTMENT (OUTPATIENT)
Dept: GENERAL RADIOLOGY | Age: 82
End: 2022-03-09
Payer: MEDICARE

## 2022-03-09 PROBLEM — K56.41 FECAL IMPACTION (HCC): Status: ACTIVE | Noted: 2022-03-09

## 2022-03-09 LAB
ANION GAP SERPL CALCULATED.3IONS-SCNC: 13 MMOL/L (ref 3–16)
BUN BLDV-MCNC: 28 MG/DL (ref 7–20)
CALCIUM SERPL-MCNC: 9.6 MG/DL (ref 8.3–10.6)
CHLORIDE BLD-SCNC: 102 MMOL/L (ref 99–110)
CO2: 25 MMOL/L (ref 21–32)
CREAT SERPL-MCNC: 1.3 MG/DL (ref 0.6–1.2)
ESTIMATED AVERAGE GLUCOSE: 162.8 MG/DL
GFR AFRICAN AMERICAN: 48
GFR NON-AFRICAN AMERICAN: 39
GLUCOSE BLD-MCNC: 118 MG/DL (ref 70–99)
GLUCOSE BLD-MCNC: 123 MG/DL (ref 70–99)
GLUCOSE BLD-MCNC: 127 MG/DL (ref 70–99)
GLUCOSE BLD-MCNC: 137 MG/DL (ref 70–99)
GLUCOSE BLD-MCNC: 176 MG/DL (ref 70–99)
HBA1C MFR BLD: 7.3 %
MAGNESIUM: 1.3 MG/DL (ref 1.8–2.4)
PERFORMED ON: ABNORMAL
POTASSIUM REFLEX MAGNESIUM: 3.3 MMOL/L (ref 3.5–5.1)
SODIUM BLD-SCNC: 140 MMOL/L (ref 136–145)

## 2022-03-09 PROCEDURE — 96361 HYDRATE IV INFUSION ADD-ON: CPT

## 2022-03-09 PROCEDURE — 96366 THER/PROPH/DIAG IV INF ADDON: CPT

## 2022-03-09 PROCEDURE — 2580000003 HC RX 258: Performed by: INTERNAL MEDICINE

## 2022-03-09 PROCEDURE — 6370000000 HC RX 637 (ALT 250 FOR IP): Performed by: INTERNAL MEDICINE

## 2022-03-09 PROCEDURE — G0378 HOSPITAL OBSERVATION PER HR: HCPCS

## 2022-03-09 PROCEDURE — 36415 COLL VENOUS BLD VENIPUNCTURE: CPT

## 2022-03-09 PROCEDURE — 83735 ASSAY OF MAGNESIUM: CPT

## 2022-03-09 PROCEDURE — 83036 HEMOGLOBIN GLYCOSYLATED A1C: CPT

## 2022-03-09 PROCEDURE — 80048 BASIC METABOLIC PNL TOTAL CA: CPT

## 2022-03-09 PROCEDURE — 96365 THER/PROPH/DIAG IV INF INIT: CPT

## 2022-03-09 PROCEDURE — 74018 RADEX ABDOMEN 1 VIEW: CPT

## 2022-03-09 PROCEDURE — 6360000002 HC RX W HCPCS: Performed by: INTERNAL MEDICINE

## 2022-03-09 RX ORDER — MAGNESIUM SULFATE IN WATER 40 MG/ML
4000 INJECTION, SOLUTION INTRAVENOUS ONCE
Status: COMPLETED | OUTPATIENT
Start: 2022-03-09 | End: 2022-03-09

## 2022-03-09 RX ORDER — POTASSIUM CHLORIDE 20 MEQ/1
20 TABLET, EXTENDED RELEASE ORAL ONCE
Status: COMPLETED | OUTPATIENT
Start: 2022-03-09 | End: 2022-03-09

## 2022-03-09 RX ADMIN — INSULIN LISPRO 1 UNITS: 100 INJECTION, SOLUTION INTRAVENOUS; SUBCUTANEOUS at 17:41

## 2022-03-09 RX ADMIN — ACETAMINOPHEN 650 MG: 325 TABLET ORAL at 06:48

## 2022-03-09 RX ADMIN — AMLODIPINE BESYLATE 5 MG: 5 TABLET ORAL at 09:24

## 2022-03-09 RX ADMIN — POLYETHYLENE GLYCOL 3350 17 G: 17 POWDER, FOR SOLUTION ORAL at 22:01

## 2022-03-09 RX ADMIN — POTASSIUM CHLORIDE 20 MEQ: 20 TABLET, EXTENDED RELEASE ORAL at 09:24

## 2022-03-09 RX ADMIN — Medication 3 MG: at 22:00

## 2022-03-09 RX ADMIN — MAGNESIUM SULFATE HEPTAHYDRATE 4000 MG: 40 INJECTION, SOLUTION INTRAVENOUS at 11:33

## 2022-03-09 RX ADMIN — MONTELUKAST 10 MG: 10 TABLET, FILM COATED ORAL at 22:00

## 2022-03-09 RX ADMIN — ATORVASTATIN CALCIUM 40 MG: 40 TABLET, FILM COATED ORAL at 09:24

## 2022-03-09 RX ADMIN — SODIUM CHLORIDE: 9 INJECTION, SOLUTION INTRAVENOUS at 10:43

## 2022-03-09 RX ADMIN — METOPROLOL SUCCINATE 100 MG: 50 TABLET, EXTENDED RELEASE ORAL at 21:59

## 2022-03-09 RX ADMIN — POLYETHYLENE GLYCOL 3350 17 G: 17 POWDER, FOR SOLUTION ORAL at 09:22

## 2022-03-09 RX ADMIN — PAROXETINE HYDROCHLORIDE 10 MG: 20 TABLET, FILM COATED ORAL at 09:24

## 2022-03-09 ASSESSMENT — PAIN SCALES - GENERAL: PAINLEVEL_OUTOF10: 6

## 2022-03-09 NOTE — PROGRESS NOTES
Hospitalist Progress Note    Date of Admission: 3/8/2022    Chief Complaint: Constipation    Hospital Course:   [de-identified] y.o. female who presented to [facility] with constipation and mild bloody stools. Patient reports she had several bouts of diarrhea last Friday and Saturday. Since then has not had any bowel movements. Later when she strained she had some bloody stool output. Patient reports history of hemorrhoids and anal fissure, and that she has been having a lot of rectal pain. Denies any nausea or vomiting. Does report problems with constipation in the past.  She has previously used MiraLAX but has not been taking this consistently. Subjective: Attempted fecal disimpaction in ED on presentation, followed by enema. Now starting to clear some loose stool. Ongoing rectal pain, no bleeding noted. No abdominal pain, N/V.     Labs:   Recent Labs     03/08/22  1258   WBC 4.6   HGB 13.6   HCT 39.8        Recent Labs     03/08/22  1258 03/09/22  0547    140   K 3.6 3.3*   CL 99 102   CO2 26 25   BUN 36* 28*   CREATININE 1.7* 1.3*   CALCIUM 10.2 9.6     Recent Labs     03/08/22  1258   AST 28   ALT 21   BILITOT 0.5   ALKPHOS 54     No results for input(s): INR in the last 72 hours. Physical Exam Performed:    BP (!) 144/68   Pulse 59   Temp 98.4 °F (36.9 °C) (Oral)   Resp 18   Ht 5' 2\" (1.575 m)   Wt 125 lb (56.7 kg)   SpO2 95%   BMI 22.86 kg/m²     General appearance:  No apparent distress, appears stated age and cooperative. HEENT:  Normal cephalic, atraumatic without obvious deformity. Pupils equal, round, and reactive to light. Extra ocular muscles intact. Conjunctivae/corneas clear. Neck: Supple, with full range of motion. No jugular venous distention. Trachea midline. Respiratory:  Normal respiratory effort. Clear to auscultation, bilaterally without Rales/Wheezes/Rhonchi.   Cardiovascular:  Regular rate and rhythm with normal S1/S2 without murmurs, rubs or gallops. Abdomen: Soft, non-tender, non-distended with normal bowel sounds. Rectal exam-deferred  Musculoskeletal:  No clubbing, cyanosis or edema bilaterally. Full range of motion without deformity. Skin: Skin color, texture, turgor normal.  No rashes or lesions. Neurologic:  Neurovascularly intact without any focal sensory/motor deficits. Cranial nerves: II-XII intact, grossly non-focal.  Psychiatric:  Alert and oriented, thought content appropriate, normal insight  Capillary Refill: Brisk,3 seconds, normal  Peripheral Pulses: +2 palpable, equal bilaterally     Assessment/Plan:    Active Hospital Problems    Diagnosis     Fecal impaction in rectum ([facility]) [K56.41]     Acute kidney injury superimposed on CKD ([facility] [N17.9, N18.9]     Diabetic retinopathy of both eyes ([facility] [E11.319]     DM type 2 with diabetic dyslipidemia ([facility] [E11.69, E78.5]     Hyperlipidemia [E78.5]     Hypertension [I10]      Severe Constipation with Fecal impaction in rectum   -Digital disimpaction unsuccessful in the ER  -Topical anesthetic cream for pain relief  -GI following  -Additional enema given  -Continue Miralax BID, Dulcolax suppository PRN  -Would recommend at least once daily Miralax regimen at discharge to avoid future episodes     Acute kidney injury superimposed on CKD   CR 1.7, baseline 1.4  IV fluids ordered  Improving  Hold nephrotoxic medications -losartan, Maxzide    DM type 2 with diabetic retinopathy  -Sliding scale insulin ordered, hypoglycemia protocol ordered    HTN-controlled, resume metoprolol and amlodipine    HLD-statin resumed      DVT Prophylaxis: SCD  Diet: ADULT DIET; Clear Liquid  Code Status: Limited  PT/OT Eval Status: NA    Dispo - Observation status. Home tomorrow if bowel improving.      Darnell Bolanos MD

## 2022-03-09 NOTE — PROGRESS NOTES
Has been NPO since MN. New orders this shift were Melatonin, limited code status (no defib. Or intubation). MOM x1, SMOG enema x1, and Increase Glycolax to twice a day. MOM and Glycolax given at bedtime and tolerated well. Enema also given at bedtime with a large incontinent watery BM medium brown. Total of 7 loose and watery brown stools this shift. Tylenol given per prn order for rectal pain. Rectum is red and excoriated with hemorrhoids. Moisture barrier applied after each BM. Call light within reach. Bed locked and in lowest position.

## 2022-03-09 NOTE — PROGRESS NOTES
Progress Note  Date:3/9/2022       Room:[address] [place] [address]  Patient Name:Jada Small     YOB: 1940     Age:82 y.o. Subjective    Subjective:  Symptoms:  Stable. Pain:  She complains of pain that is mild. Review of Systems  Objective         Vitals Last 24 Hours:  TEMPERATURE:  Temp  Av.3 °F (36.8 °C)  Min: 98.1 °F (36.7 °C)  Max: 98.4 °F (36.9 °C)  RESPIRATIONS RANGE: Resp  Av.9  Min: 9  Max: 24  PULSE OXIMETRY RANGE: SpO2  Av.1 %  Min: 94 %  Max: 98 %  PULSE RANGE: Pulse  Av.6  Min: 59  Max: 71  BLOOD PRESSURE RANGE: Systolic (88BLA), MCL:318 , Min:139 , PXL:016   ; Diastolic (90IRN), YEL:76, Min:53, Max:75    I/O (24Hr): Intake/Output Summary (Last 24 hours) at 3/9/2022 0752  Last data filed at 3/9/2022 0653  Gross per 24 hour   Intake 1624.2 ml   Output    Net 1624.2 ml     Objective:  General Appearance: In no acute distress. Vital signs: (most recent): Blood pressure (!) 145/62, pulse 66, temperature 98.2 °F (36.8 °C), temperature source Oral, resp. rate 18, height 5' 2\" (1.575 m), weight 125 lb (56.7 kg), SpO2 95 %. Heart: Normal rate. Regular rhythm. S1 normal and S2 normal.    Abdomen: Abdomen is soft. Bowel sounds are normal.   There is no abdominal tenderness. Labs/Imaging/Diagnostics    Labs:  CBC:  Recent Labs     22  1258   WBC 4.6   RBC 4.54   HGB 13.6   HCT 39.8   MCV 87.5   RDW 13.9        CHEMISTRIES:  Recent Labs     22  1258 22  0547    140   K 3.6 3.3*   CL 99 102   CO2 26 25   BUN 36* 28*   CREATININE 1.7* 1.3*   GLUCOSE 220* 118*   MG  --  1.30*     PT/INR:No results for input(s): PROTIME, INR in the last 72 hours. APTT:No results for input(s): APTT in the last 72 hours.   LIVER PROFILE:  Recent Labs     22  1258   AST 28   ALT 21   BILITOT 0.5   ALKPHOS 54       Imaging Last 24 Hours:  CT ABDOMEN PELVIS WO CONTRAST Additional Contrast? None    Result Date: 3/8/2022  EXAMINATION: CT OF THE ABDOMEN AND PELVIS WITHOUT CONTRAST 3/8/2022 1:08 pm TECHNIQUE: CT of the abdomen and pelvis was performed without the administration of intravenous contrast. Multiplanar reformatted images are provided for review. Dose modulation, iterative reconstruction, and/or weight based adjustment of the mA/kV was utilized to reduce the radiation dose to as low as reasonably achievable. COMPARISON: None. HISTORY: ORDERING SYSTEM PROVIDED HISTORY: Pain, constipation, rule out impaction/constipation TECHNOLOGIST PROVIDED HISTORY: Reason for exam:->Pain, constipation, rule out impaction/constipation Additional Contrast?->None Decision Support Exception - unselect if not a suspected or confirmed emergency medical condition->Emergency Medical Condition (MA) Reason for Exam: constipation this am-low back pain-r/o impaction Relevant Medical/Surgical History: appy-gb removed-hysterectomy FINDINGS: Lower Chest: No acute airspace disease. No pleural or pericardial effusion Organs: Status post cholecystectomy. The liver appears normal.  The kidneys, adrenal glands, spleen and pancreas appear normal. GI/Bowel: There is a significant amount of stool in the rectum. Otherwise, no significant stool burden seen in the colon. No bowel wall thickening. Pelvis: The bladder appears unremarkable. No bladder or ureteral stones identified. Peritoneum/Retroperitoneum: Atherosclerotic changes. No adenopathy. No extraluminal gas. Bones/Soft Tissues: No soft tissue hernia. No acute fracture. No lytic or blastic lesion. Significant amount of stool in the rectum, otherwise, no significant stool burden seen in the colon Status post cholecystectomy Otherwise, no acute abnormalities seen in the abdomen or pelvis     XR ABDOMEN (KUB) (SINGLE AP VIEW)    Result Date: 3/8/2022  EXAMINATION: ONE SUPINE XRAY VIEW(S) OF THE ABDOMEN 3/8/2022 12:57 pm COMPARISON: None. HISTORY: ORDERING SYSTEM PROVIDED HISTORY: Constipation, possible impaction. TECHNOLOGIST PROVIDED HISTORY: Reason for exam:->Constipation, possible impaction. Reason for Exam: Constipation, possible impaction FINDINGS: Gas seen in nondilated bowel loops. Significant stool burden in the rectum. Vascular calcifications. Significant stool burden in the rectum     Assessment//Plan           Hospital Problems           Last Modified POA    * (Principal) Fecal impaction in rectum ([facility] 3/8/2022 Yes    Hyperlipidemia 3/8/2022 Yes    Hypertension 3/8/2022 Yes    DM type 2 with diabetic dyslipidemia ([address] 3/8/2022 Yes    Diabetic retinopathy of both eyes ([facility] 3/8/2022 Yes    Acute kidney injury superimposed on CKD ([address] 3/8/2022 Yes        Assessment & Plan  [de-identified] yo w DM, CKD, PVD, GERD and h/o colon polyps, presented w constipation and fecal impaction confirmed by CT. Manual disimpaction in ER failed due to anal discomfort w hemorrhoids and fissures/excoriation. Her constipation seems to have responded to laxatives and enemas, and has no abd pain or bloating. Had a neg. EGD/colonoscopy in 1/2016.     Plan:   1. Supportive care  2. May need a Surgery consult for her hemorrhoids/anal fissures  3. Needs local Rx as well  4. Will obtain a KUB to assess the status of the impaction  5. Continue Miralax  6.  Will follow     Molly Herrera MD       (O) [phone]    Electronically signed by Molly Herrera MD on 3/9/22 at 7:52 AM EST

## 2022-03-09 NOTE — CARE COORDINATION
CASE MANAGEMENT INITIAL ASSESSMENT      Reviewed chart and completed assessment with patient:and  bedside  Explained Case Management role/services. Primary contact information:[facility] Decision Maker :   Primary Decision Maker: Adri Nava Spouse - [phone]    Secondary Decision Maker: Angela Aiken Child - [phone]          Can this person be reached and be able to respond quickly, such as within a few minutes or hours? Yes    Admit date/status:3/8/22  Diagnosis:JELANI   Is this a Readmission?:  No      Insurance:BCBS   Precert required for SNF: Yes       3 night stay required: No    Living arrangements, Adls, care needs, prior to admission:lives in ranch style home with . IPTA     Transportation:private     Durable Medical Equipment at home:  Walker__Cane__RTS__ BSC__Shower Chair__  02__ HHN__ CPAP__  BiPap__  Hospital Bed__ W/C___ Other__________    Services in the home and/or outpatient, prior to [address] Notification (HEN):needed for SNF    Barriers to discharge:none    Plan/comments:spoke with patient plans on returning home at discharge. Reported IPTA and uses no DME. Will be able to get to any follow up appointments. And denied any DCP needs. CM will follow clinical improvement.  Cartre Hodges RN       [facility] on chart for MD signature

## 2022-03-09 NOTE — CONSULTS
Gastroenterology Consult Note    Patient:   Shruthi Richardson   YOB: 1940   Facility:   [facility]   Referring/PCP: Nell Macias MD  Date:     3/8/2022  Consultant:   Joyce Narvaez MD, MD    Subjective: This [de-identified] y.o. female was admitted 3/8/2022 with a diagnosis of \"JELANI (acute kidney injury) ([address] [N17.9]  Fecal impaction in rectum ([address] [K56.41]  Fecal impaction ([address] [K56.41]\" and is seen in consultation regarding \"constipation\". Information was obtained from interview of  the patient, examination of the patient, and review of records. I did  update the past medical, surgical, social and / or family history. Constipation mod-severe in colon for days associated w anal pain    Current status  Present  Diet Order: ADULT DIET; Full Liquid  Diet NPO and she is tolerating diet. Recently, she has experienced little, maximum 2/10 generalized abdominal  Pain and she has not required Intravenous narcotic analgesics. The patient has also experienced no diarrhea, fever, hematochezia, melena, nausea and vomiting      Prior to Admission medications    Medication Sig Start Date End Date Taking?  Authorizing Provider   fluconazole (DIFLUCAN) 100 MG tablet  1/3/22  Yes Historical Provider, MD   PARoxetine (PAXIL) 10 MG tablet Take 10 mg by mouth every morning   Yes Historical Provider, MD   losartan (COZAAR) 50 MG tablet TAKE ONE TABLET BY MOUTH DAILY 2/1/22  Yes Janette Ornelas MD   metoprolol succinate (TOPROL XL) 100 MG extended release tablet TAKE ONE TABLET BY MOUTH ONCE NIGHTLY 2/1/22  Yes Janette Ornelas MD   amLODIPine (NORVASC) 5 MG tablet Take 1 tablet by mouth daily 2/1/22  Yes Janette Ornelas MD   atorvastatin (LIPITOR) 40 MG tablet TAKE ONE TABLET BY MOUTH DAILY 2/1/22  Yes Janette Ornelas MD   fenofibrate (TRIGLIDE) 160 MG tablet TAKE ONE TABLET BY MOUTH DAILY 2/1/22  Yes Janette Ornelas MD   Multiple Vitamins-Minerals (THERAPEUTIC MULTIVITAMIN-MINERALS) tablet Take 1 tablet by mouth daily   Yes Historical Provider, MD   montelukast (SINGULAIR) 10 MG tablet TAKE ONE TABLET BY MOUTH ONCE NIGHTLY 11/19/21  Yes Chuckie Phan MD   Omeprazole Magnesium (PRILOSEC OTC PO) Take by mouth   Yes Historical Provider, MD   Blood Pressure KIT 1 kit by Does not apply route daily 10/20/21  Yes Zen Garsia APRN - CNP   triamterene-hydroCHLOROthiazide (MAXZIDE-25) 37.5-25 MG per tablet TAKE ONE TABLET BY MOUTH DAILY 10/6/21  Yes MARTINE Slater - CNP   Probiotic Product (PROBIOTIC DAILY PO) Take by mouth   Yes Historical Provider, MD   aspirin 81 MG EC tablet Take 81 mg by mouth every evening. 12/31/10  Yes Historical Provider, MD   estradiol (ESTRACE VAGINAL) 0.1 MG/GM vaginal cream Place 2 g vaginally daily for 14 days, THEN 2 g Twice a Week for 16 days. Patient not taking: Reported on 2/1/2022 1/3/22 2/2/22  Chuckie Phan MD      Scheduled Medications:    lidocaine-prilocaine   Topical Once    [START ON 3/9/2022] amLODIPine  5 mg Oral Daily    [START ON 3/9/2022] atorvastatin  40 mg Oral Daily    metoprolol succinate  100 mg Oral Nightly    montelukast  10 mg Oral Nightly    [START ON 3/9/2022] PARoxetine  10 mg Oral QAM    insulin lispro  0-6 Units SubCUTAneous TID WC    insulin lispro  0-3 Units SubCUTAneous Nightly    sodium chloride flush  5-40 mL IntraVENous 2 times per day    polyethylene glycol  17 g Oral Daily     Infusions:    dextrose      sodium chloride      sodium chloride 75 mL/hr at 03/08/22 0259     PRN Medications: glucose, glucagon (rDNA), dextrose, sodium chloride flush, sodium chloride, ondansetron **OR** ondansetron, polyethylene glycol, acetaminophen **OR** acetaminophen, dextrose bolus (hypoglycemia) **OR** dextrose bolus (hypoglycemia), melatonin  Allergies:    Allergies   Allergen Reactions    Adhesive Tape      Band aid caused skin tears in past     Codeine Other (See Comments)     Felt strange    Iodides      Some issues with kidneys- told not to have per PCP     Sulfa Antibiotics      Pt can't remember reaction    Penicillins Rash       Past Medical History:   Diagnosis Date    Anal fissure 2013    Back pain     Carotid stenosis 2021    Chronic insomnia     Chronic kidney disease, stage III (moderate) ([address] 2017    Depression     [facility]     DM type 2 with diabetic dyslipidemia ([facility] 2019    GERD (gastroesophageal reflux disease)     Dr. Andreia Harrison (GI)    Glossitis 2017    Hearing loss     Hypercholesteremia     Hypertension     Lumbar radiculopathy 2017    Osteopenia 2019    Patellofemoral stress syndrome of right knee 2017     Past Surgical History:   Procedure Laterality Date    APPENDECTOMY      CHOLECYSTECTOMY      CHOLECYSTECTOMY      COLONOSCOPY  2016    Normal    HYSTERECTOMY, TOTAL ABDOMINAL      KNEE SURGERY  left knee    OVARY REMOVAL      unknown which side    UPPER GASTROINTESTINAL ENDOSCOPY  2016    Normal       Social:   Social History     Tobacco Use    Smoking status: Never Smoker    Smokeless tobacco: Never Used   Substance Use Topics    Alcohol use: Yes     Comment: occassionally     Family:   Family History   Problem Relation Age of Onset    Stroke Father     Heart Disease Brother     High Blood Pressure Other        ROS: Pertinent items are noted in HPI.     Objective:   Vital Signs:  Temp (24hrs), Av.3 °F (36.8 °C), Min:98.1 °F (36.7 °C), Max:98.4 °F (07.5 °C)     Systolic (65GWO), NQP:395 , Min:139 , XJE:269      Diastolic (88TUO), YTM:16, Min:53, Max:75     Pulse  Av.4  Min: 59  Max: 71  BP (!) 169/72   Pulse 65   Temp 98.3 °F (36.8 °C) (Oral)   Resp 18   Ht 5' 2\" (1.575 m)   Wt 125 lb (56.7 kg)   SpO2 95%   BMI 22.86 kg/m²      Physical Exam:   BP (!) 145/62   Pulse 66   Temp 98.2 °F (36.8 °C) (Oral)   Resp 18   Ht 5' 2\" (1.575 m)   Wt 125 lb (56.7 kg)   SpO2 95%   BMI 22.86 kg/m²   General appearance: alert, appears stated age and cooperative  Lungs: clear to auscultation bilaterally  Chest wall: no tenderness  Heart: regular rate and rhythm, S1, S2 normal, no murmur, click, rub or gallop  Abdomen: soft, non-tender; bowel sounds normal; no masses,  no organomegaly  Extremities: extremities normal, atraumatic, no cyanosis or edema  Skin: Skin color, texture, turgor normal. No rashes or lesions  Neurologic: Grossly normal    Lab and Imaging Review   Recent Labs     03/08/22  1258   WBC 4.6   HGB 13.6   MCV 87.5         K 3.6   CL 99   CO2 26   BUN 36*   CREATININE 1.7*   GLUCOSE 220*   CALCIUM 10.2   PROT 6.7   LABALBU 4.7   AST 28   ALT 21   ALKPHOS 54   BILITOT 0.5   LIPASE 53.0       Assessment:     Patient Active Problem List    Diagnosis Date Noted    Fecal impaction in rectum ([address] 03/08/2022    Acute kidney injury superimposed on CKD ([address] 03/08/2022    Coronary artery disease involving native coronary artery of native heart without angina pectoris 01/03/2022    SOB (shortness of breath)     Abnormal ECG     Other chest pain 11/29/2021    Carotid stenosis 11/11/2021    Diabetic retinopathy of both eyes ([address]     Uncontrolled type 2 diabetes mellitus with hyperglycemia ([address] 02/05/2019    DM type 2 with diabetic dyslipidemia ([address] 02/05/2019    GERD (gastroesophageal reflux disease) 01/29/2019    Extrinsic asthma 01/29/2019    Hyperlipidemia 01/29/2019    Hypertension 01/29/2019    Osteopenia 01/29/2019    Insomnia     Chronic insomnia     [facility] disease, stage III (moderate) ([facility]) 05/17/2017    Primary osteoarthritis of right knee 03/03/2017     [de-identified] yo w DM, CKD, PVD, GERD and h/o colon polyps, presented w constipation and fecal impaction confirmed by CT. Manual disimpaction in ER failed due partly to anal discomfort/fissures and extent of the impaction. Had a neg. EGD/colonoscopy in 1/2016. Plan:   1. Supportive care  2.  May need disimpaction under anesthesia by Surgery  3. Will try Miralax, MOM and SMOG enema  4.  Will follow    Ajith Zimmer MD       (O) [phone]

## 2022-03-09 NOTE — PLAN OF CARE
Problem: Pain:  Goal: Pain level will decrease  Description: Pain level will decrease  Outcome: Ongoing     Problem: Falls - Risk of:  Goal: Will remain free from falls  Description: Will remain free from falls  3/9/2022 1019 by Hanh Jones RN  Outcome: Ongoing

## 2022-03-09 NOTE — PROGRESS NOTES
[de-identified] yo w DM, CKD, PVD, GERD and h/o colon polyps, presented w constipation and fecal impaction confirmed by CT. Manual disimpaction in ER failed due partly to anal discomfort/fissures and extent of the impaction. Had a neg. EGD/colonoscopy in 1/2016.     Plan:   1. Supportive care  2. May need disimpaction under anesthesia by Surgery  3. Will try Miralax, MOM and SMOG enema  4.  Will follow     Emigdio Hinojosa MD       O) [phone]

## 2022-03-10 ENCOUNTER — APPOINTMENT (OUTPATIENT)
Dept: GENERAL RADIOLOGY | Age: 82
End: 2022-03-10
Payer: MEDICARE

## 2022-03-10 VITALS
OXYGEN SATURATION: 97 % | DIASTOLIC BLOOD PRESSURE: 65 MMHG | BODY MASS INDEX: 23 KG/M2 | RESPIRATION RATE: 16 BRPM | SYSTOLIC BLOOD PRESSURE: 151 MMHG | TEMPERATURE: 97.8 F | WEIGHT: 125 LBS | HEIGHT: 62 IN | HEART RATE: 56 BPM

## 2022-03-10 LAB
ANION GAP SERPL CALCULATED.3IONS-SCNC: 11 MMOL/L (ref 3–16)
BUN BLDV-MCNC: 18 MG/DL (ref 7–20)
CALCIUM SERPL-MCNC: 9.2 MG/DL (ref 8.3–10.6)
CHLORIDE BLD-SCNC: 103 MMOL/L (ref 99–110)
CO2: 26 MMOL/L (ref 21–32)
CREAT SERPL-MCNC: 1.2 MG/DL (ref 0.6–1.2)
GFR AFRICAN AMERICAN: 52
GFR NON-AFRICAN AMERICAN: 43
GLUCOSE BLD-MCNC: 123 MG/DL (ref 70–99)
MAGNESIUM: 2.1 MG/DL (ref 1.8–2.4)
POTASSIUM REFLEX MAGNESIUM: 3.3 MMOL/L (ref 3.5–5.1)
SODIUM BLD-SCNC: 140 MMOL/L (ref 136–145)

## 2022-03-10 PROCEDURE — 2580000003 HC RX 258: Performed by: INTERNAL MEDICINE

## 2022-03-10 PROCEDURE — 80048 BASIC METABOLIC PNL TOTAL CA: CPT

## 2022-03-10 PROCEDURE — 6370000000 HC RX 637 (ALT 250 FOR IP): Performed by: FAMILY MEDICINE

## 2022-03-10 PROCEDURE — 36415 COLL VENOUS BLD VENIPUNCTURE: CPT

## 2022-03-10 PROCEDURE — G0378 HOSPITAL OBSERVATION PER HR: HCPCS

## 2022-03-10 PROCEDURE — 74018 RADEX ABDOMEN 1 VIEW: CPT

## 2022-03-10 PROCEDURE — 83735 ASSAY OF MAGNESIUM: CPT

## 2022-03-10 PROCEDURE — 6370000000 HC RX 637 (ALT 250 FOR IP): Performed by: INTERNAL MEDICINE

## 2022-03-10 RX ORDER — HYDROCORTISONE ACETATE 25 MG/1
25 SUPPOSITORY RECTAL 2 TIMES DAILY
Status: DISCONTINUED | OUTPATIENT
Start: 2022-03-10 | End: 2022-03-10 | Stop reason: HOSPADM

## 2022-03-10 RX ORDER — POTASSIUM CHLORIDE 20 MEQ/1
20 TABLET, EXTENDED RELEASE ORAL ONCE
Status: COMPLETED | OUTPATIENT
Start: 2022-03-10 | End: 2022-03-10

## 2022-03-10 RX ORDER — HYDROCORTISONE ACETATE 25 MG/1
25 SUPPOSITORY RECTAL 2 TIMES DAILY
Qty: 30 SUPPOSITORY | Refills: 0 | Status: SHIPPED | OUTPATIENT
Start: 2022-03-10

## 2022-03-10 RX ORDER — POLYETHYLENE GLYCOL 3350 17 G/17G
17 POWDER, FOR SOLUTION ORAL DAILY PRN
Qty: 527 G | Refills: 1 | Status: SHIPPED | OUTPATIENT
Start: 2022-03-10 | End: 2022-04-09

## 2022-03-10 RX ADMIN — SODIUM CHLORIDE: 9 INJECTION, SOLUTION INTRAVENOUS at 04:17

## 2022-03-10 RX ADMIN — ACETAMINOPHEN 650 MG: 325 TABLET ORAL at 11:29

## 2022-03-10 RX ADMIN — HYDROCORTISONE ACETATE 25 MG: 25 SUPPOSITORY RECTAL at 16:30

## 2022-03-10 RX ADMIN — AMLODIPINE BESYLATE 5 MG: 5 TABLET ORAL at 11:09

## 2022-03-10 RX ADMIN — Medication 10 ML: at 11:26

## 2022-03-10 RX ADMIN — POLYETHYLENE GLYCOL 3350 17 G: 17 POWDER, FOR SOLUTION ORAL at 11:11

## 2022-03-10 RX ADMIN — PAROXETINE HYDROCHLORIDE 10 MG: 20 TABLET, FILM COATED ORAL at 11:09

## 2022-03-10 RX ADMIN — POTASSIUM CHLORIDE 20 MEQ: 1500 TABLET, EXTENDED RELEASE ORAL at 16:30

## 2022-03-10 RX ADMIN — ATORVASTATIN CALCIUM 40 MG: 40 TABLET, FILM COATED ORAL at 11:09

## 2022-03-10 ASSESSMENT — PAIN SCALES - GENERAL
PAINLEVEL_OUTOF10: 0
PAINLEVEL_OUTOF10: 6

## 2022-03-10 NOTE — PROGRESS NOTES
Hospitalist Progress Note    Date of Admission: 3/8/2022    Chief Complaint: Constipation    Hospital Course:   [de-identified] y.o. female who presented to [facility] with constipation and mild bloody stools. Patient reports she had several bouts of diarrhea last Friday and Saturday. Since then has not had any bowel movements. Later when she strained she had some bloody stool output. Patient reports history of hemorrhoids and anal fissure, and that she has been having a lot of rectal pain. Denies any nausea or vomiting. Does report problems with constipation in the past.  She has previously used MiraLAX but has not been taking this consistently. Subjective: Attempted fecal disimpaction in ED on presentation, followed by enema. Now starting to clear some loose stool. Has pain in her rectum, hemorrhoids, vaginal area. Labs:   Recent Labs     03/08/22  1258   WBC 4.6   HGB 13.6   HCT 39.8        Recent Labs     03/08/22  1258 03/09/22  0547 03/10/22  0554    140 140   K 3.6 3.3* 3.3*   CL 99 102 103   CO2 26 25 26   BUN 36* 28* 18   CREATININE 1.7* 1.3* 1.2   CALCIUM 10.2 9.6 9.2     Recent Labs     03/08/22  1258   AST 28   ALT 21   BILITOT 0.5   ALKPHOS 54     No results for input(s): INR in the last 72 hours. Physical Exam Performed:    BP (!) 151/70   Pulse 60   Temp 97.5 °F (36.4 °C) (Oral)   Resp 16   Ht 5' 2\" (1.575 m)   Wt 125 lb (56.7 kg)   SpO2 99%   BMI 22.86 kg/m²     General appearance:  No apparent distress, appears stated age and cooperative. HEENT:  Normal cephalic, atraumatic without obvious deformity. Pupils equal, round, and reactive to light. Extra ocular muscles intact. Conjunctivae/corneas clear. Neck: Supple, with full range of motion. No jugular venous distention. Trachea midline. Respiratory:  Normal respiratory effort. Clear to auscultation, bilaterally without Rales/Wheezes/Rhonchi.   Cardiovascular:  Regular rate and rhythm with normal S1/S2 without murmurs, rubs or gallops. Abdomen: Soft, non-tender, non-distended with normal bowel sounds. Rectal exam-deferred  Musculoskeletal:  No clubbing, cyanosis or edema bilaterally. Full range of motion without deformity. Skin: Skin color, texture, turgor normal.  No rashes or lesions. Neurologic:  Neurovascularly intact without any focal sensory/motor deficits. Cranial nerves: II-XII intact, grossly non-focal.  Psychiatric:  Alert and oriented, thought content appropriate, normal insight  Capillary Refill: Brisk,3 seconds, normal  Peripheral Pulses: +2 palpable, equal bilaterally     Assessment/Plan:    Active Hospital Problems    Diagnosis     Fecal impaction ([address] [K56.41]     Fecal impaction in rectum ([address] [K56.41]     Acute kidney injury superimposed on CKD ([address] [N17.9, N18.9]     Diabetic retinopathy of both eyes ([address] [E11.319]     DM type 2 with diabetic dyslipidemia ([address] [E11.69, E78.5]     Hyperlipidemia [E78.5]     Hypertension [I10]      Severe Constipation with Fecal impaction in rectum   -Digital disimpaction unsuccessful in the ER  -Topical anesthetic cream for pain relief  -GI following  -Additional enema given  -Continue Miralax BID, Dulcolax suppository PRN  -Would recommend at least once daily Miralax regimen at discharge to avoid future episodes     Acute kidney injury superimposed on CKD   CR 1.7, baseline 1.4  IV fluids ordered  Improving  Hold nephrotoxic medications -losartan, Maxzide    DM type 2 with diabetic retinopathy  -Sliding scale insulin ordered, hypoglycemia protocol ordered    HTN-controlled, resume metoprolol and amlodipine    HLD-statin resumed    Hemorrhoids and skin irritation - annusol and calmoseptine. DVT Prophylaxis: SCD  Diet: ADULT DIET; Clear Liquid  Code Status: Limited  PT/OT Eval Status: NA    Dispo - Observation status. Home today or tomorrow if bowel improving.      Brooke Gutierrez MD

## 2022-03-10 NOTE — PROGRESS NOTES
Progress Note  Date:3/10/2022       Room:[id number]  Patient Name:Jada Andrew     YOB: 1940     Age:82 y.o. Subjective    Subjective:  Symptoms:  Stable. Pain:  She complains of pain that is mild. Review of Systems  Objective         Vitals Last 24 Hours:  TEMPERATURE:  Temp  Av °F (36.7 °C)  Min: 97.5 °F (36.4 °C)  Max: 98.4 °F (36.9 °C)  RESPIRATIONS RANGE: Resp  Av  Min: 18  Max: 18  PULSE OXIMETRY RANGE: SpO2  Av %  Min: 95 %  Max: 99 %  PULSE RANGE: Pulse  Av.5  Min: 59  Max: 60  BLOOD PRESSURE RANGE: Systolic (35KCL), ZW , Min:144 , ABK:777   ; Diastolic (66CEJ), CNA:23, Min:66, Max:68    I/O (24Hr): Intake/Output Summary (Last 24 hours) at 3/10/2022 0505  Last data filed at 3/10/2022 0419  Gross per 24 hour   Intake 1366.5 ml   Output 275 ml   Net 1091.5 ml     Objective:  General Appearance: In no acute distress. Vital signs: (most recent): Blood pressure (!) 167/66, pulse 60, temperature 97.5 °F (36.4 °C), temperature source Oral, resp. rate 18, height 5' 2\" (1.575 m), weight 125 lb (56.7 kg), SpO2 99 %. Heart: Normal rate. Regular rhythm. S1 normal and S2 normal.    Abdomen: Abdomen is soft. Bowel sounds are normal.   There is no abdominal tenderness. Labs/Imaging/Diagnostics    Labs:  CBC:  Recent Labs     22  1258   WBC 4.6   RBC 4.54   HGB 13.6   HCT 39.8   MCV 87.5   RDW 13.9        CHEMISTRIES:  Recent Labs     22  1258 22  0547    140   K 3.6 3.3*   CL 99 102   CO2 26 25   BUN 36* 28*   CREATININE 1.7* 1.3*   GLUCOSE 220* 118*   MG  --  1.30*     PT/INR:No results for input(s): PROTIME, INR in the last 72 hours. APTT:No results for input(s): APTT in the last 72 hours.   LIVER PROFILE:  Recent Labs     22  1258   AST 28   ALT 21   BILITOT 0.5   ALKPHOS 54       Imaging Last 24 Hours:  CT ABDOMEN PELVIS WO CONTRAST Additional Contrast? None    Result Date: 3/8/2022  EXAMINATION: CT OF THE ABDOMEN AND PELVIS WITHOUT CONTRAST 3/8/2022 1:08 pm TECHNIQUE: CT of the abdomen and pelvis was performed without the administration of intravenous contrast. Multiplanar reformatted images are provided for review. Dose modulation, iterative reconstruction, and/or weight based adjustment of the mA/kV was utilized to reduce the radiation dose to as low as reasonably achievable. COMPARISON: None. HISTORY: ORDERING SYSTEM PROVIDED HISTORY: Pain, constipation, rule out impaction/constipation TECHNOLOGIST PROVIDED HISTORY: Reason for exam:->Pain, constipation, rule out impaction/constipation Additional Contrast?->None Decision Support Exception - unselect if not a suspected or confirmed emergency medical condition->Emergency Medical Condition (MA) Reason for Exam: constipation this am-low back pain-r/o impaction Relevant Medical/Surgical History: appy-gb removed-hysterectomy FINDINGS: Lower Chest: No acute airspace disease. No pleural or pericardial effusion Organs: Status post cholecystectomy. The liver appears normal.  The kidneys, adrenal glands, spleen and pancreas appear normal. GI/Bowel: There is a significant amount of stool in the rectum. Otherwise, no significant stool burden seen in the colon. No bowel wall thickening. Pelvis: The bladder appears unremarkable. No bladder or ureteral stones identified. Peritoneum/Retroperitoneum: Atherosclerotic changes. No adenopathy. No extraluminal gas. Bones/Soft Tissues: No soft tissue hernia. No acute fracture. No lytic or blastic lesion. Significant amount of stool in the rectum, otherwise, no significant stool burden seen in the colon Status post cholecystectomy Otherwise, no acute abnormalities seen in the abdomen or pelvis     XR ABDOMEN (KUB) (SINGLE AP VIEW)    Result Date: 3/8/2022  EXAMINATION: ONE SUPINE XRAY VIEW(S) OF THE ABDOMEN 3/8/2022 12:57 pm COMPARISON: None. HISTORY: ORDERING SYSTEM PROVIDED HISTORY: Constipation, possible impaction. TECHNOLOGIST PROVIDED HISTORY: Reason for exam:->Constipation, possible impaction. Reason for Exam: Constipation, possible impaction FINDINGS: Gas seen in nondilated bowel loops. Significant stool burden in the rectum. Vascular calcifications. Significant stool burden in the rectum     Assessment//Plan           Hospital Problems           Last Modified POA    * (Principal) Fecal impaction in rectum ([address] 3/8/2022 Yes    Hyperlipidemia 3/8/2022 Yes    Hypertension 3/8/2022 Yes    DM type 2 with diabetic dyslipidemia ([address] 3/8/2022 Yes    Diabetic retinopathy of both eyes ([address] 3/8/2022 Yes    Acute kidney injury superimposed on CKD ([address] 3/8/2022 Yes    Fecal impaction ([address] 3/9/2022 Yes        Assessment & Plan  [de-identified] yo w DM, CKD, PVD, GERD and h/o colon polyps, presented w constipation and fecal impaction confirmed by CT. Manual disimpaction in ER failed due to anal discomfort w hemorrhoids and fissures/excoriation. Her constipation has responded to laxatives and enemas, and has no abd pain or bloating, and her anal Sx have markedly improved. Had a neg. EGD/colonoscopy in 1/2016.     Plan:   1. Supportive care  2. Continue local anal Rx as well  3. Continue Miralax at home  4. OK to D/C from GI standpoint  5.  Will follow in office     Adrienne Roth MD       (O) [phone]    Electronically signed by Adrienne Roth MD on 3/9/22 at 7:52 AM EST

## 2022-03-10 NOTE — CARE COORDINATION
Chart reviewed day 2. Patient status changed to OVA, presented SHRESTHA letter this am. D/c pending bowel improvement. GI ok with d/c. IPTA following.  Susie Curry RN

## 2022-03-11 ENCOUNTER — CARE COORDINATION (OUTPATIENT)
Dept: CASE MANAGEMENT | Age: 82
End: 2022-03-11

## 2022-03-11 ENCOUNTER — TELEPHONE (OUTPATIENT)
Dept: FAMILY MEDICINE CLINIC | Age: 82
End: 2022-03-11

## 2022-03-11 NOTE — TELEPHONE ENCOUNTER
[facility] Transitions Initial Follow Up Call    Outreach made within 2 business days of discharge: Yes    Patient: Lucinda Seo Patient : 1940   MRN: <[id number]>  Reason for Admission: There are no discharge diagnoses documented for the most recent discharge. Discharge Date: 3/10/22       Spoke with: IRVING for patient to call back    Discharge department/facility: [facility]    Non-face-to-face services provided:  Obtained and reviewed discharge summary and/or continuity of care documents    TCM Interactive Patient Contact:  Was patient able to fill all prescriptions: Yes  Was patient instructed to bring all medications to the follow-up visit: Yes  Is patient taking all medications as directed in the discharge summary?  Yes  Does patient understand their discharge instructions: Yes  Does patient have questions or concerns that need addressed prior to 7-14 day follow up office visit: no    Scheduled appointment with PCP within 7-14 days    Follow Up  Future Appointments   Date Time Provider Jones Berg   2022 10:30 AM MD TOBIAS Rodriguez [facility] - [facility]   2022 10:00 AM MD TOBIAS Rodriguez  [place] - [facility]   2022  1:00 PM MD Gabby Bucio [address] [place] [address],

## 2022-03-11 NOTE — CARE COORDINATION
[facility] Transitions Initial Follow Up Call    Call within 2 business days of discharge: Yes    Patient: Dmitry Kitchen Patient : 1940   MRN: <[id number]>  Reason for Admission: fecal impaction, JELANI  Discharge Date: 3/10/22 RARS: Readmission Risk Score: 11.4 ( )      Last Discharge [facility]       Complaint Diagnosis Description Type Department Provider    3/8/22 Fecal Impaction JELANI (acute kidney injury) ([facility] [address] . .. ED to Hosp-Admission (Discharged) (ADMITTED) Neftaly Haider MD; Jerod. .. Attempted to reach pt for follow up call. Left message requesting call back. Nii Peraza returned call - left message stating pt is \"not doing great at all,\" and they were unable to  one of her prescriptions. Returned call with no answer. Will attempt again later.     Care Transitions 24 Hour Call    Care Transitions Interventions         Follow Up  Future Appointments   Date Time Provider Jones Berg   2022 10:30 AM MD TOBIAS Reeves  [place] - [facility]   2022 10:00 AM MD TOBIAS Reeves  [place] - DYD   2022  1:00 PM MD Nikki Muniz

## 2022-03-11 NOTE — DISCHARGE SUMMARY
Hospital Medicine Discharge Summary    Patient ID: Raymon Lyons      Patient's PCP: Devonte Husain MD    Admit Date: 3/8/2022     Discharge Date:   3/10/22    Admitting Provider: Rafiq Pelletier MD     Discharge Provider: Donna Mirza MD     Discharge Diagnoses: Active Hospital Problems    Diagnosis     Fecal impaction ([facility] [K56.41]     Fecal impaction in rectum ([address] [K56.41]     Acute kidney injury superimposed on CKD ([facility] [N17.9, N18.9]     Diabetic retinopathy of both eyes ([facility] [E11.319]     DM type 2 with diabetic dyslipidemia ([facility] [E11.69, E78.5]     Hyperlipidemia [E78.5]     Hypertension [I10]        The patient was seen and examined on day of discharge and this discharge summary is in conjunction with any daily progress note from day of discharge. Hospital Course: [de-identified] y.o. female who presented to [address] [place] [address] with constipation and mild bloody stools. Patient reports she had several bouts of diarrhea last Friday and Saturday. Since then has not had any bowel movements. Later when she strained she had some bloody stool output. Patient reports history of hemorrhoids and anal fissure, and that she has been having a lot of rectal pain. Denies any nausea or vomiting. Does report problems with constipation in the past.  She has previously used MiraLAX but has not been taking this consistently.      Severe Constipation with Fecal impaction in rectum   -Digital disimpaction unsuccessful in the ER  -Topical anesthetic cream for pain relief  -GI followed  -Additional laxatives and enema given with relief  -Continue Miralax, Dulcolax suppository PRN  -Would recommend at least once daily Miralax regimen at discharge to avoid future episodes  -annusol and calmoseptine as needed for hemorrhoids and skin irritation     Acute kidney injury superimposed on CKD   CR 1.7->1.2, baseline 1.4  IV fluids   Improved  Held nephrotoxic medications -losartan, Maxzide - resumed at discharge     DM type 2 with diabetic retinopathy  -Sliding scale insulin ordered, hypoglycemia protocol     HTN-controlled, resumed metoprolol and amlodipine     HLD-statin resumed     Hemorrhoids and skin irritation - annusol and calmoseptine. Physical Exam Performed:     BP (!) 151/65   Pulse 56   Temp 97.8 °F (36.6 °C) (Oral)   Resp 16   Ht 5' 2\" (1.575 m)   Wt 125 lb (56.7 kg)   SpO2 97%   BMI 22.86 kg/m²       General appearance:  No apparent distress, appears stated age and cooperative. HEENT:  Normal cephalic, atraumatic without obvious deformity. Pupils equal, round, and reactive to light. Extra ocular muscles intact. Conjunctivae/corneas clear. Neck: Supple, with full range of motion. No jugular venous distention. Trachea midline. Respiratory:  Normal respiratory effort. Clear to auscultation, bilaterally without Rales/Wheezes/Rhonchi. Cardiovascular:  Regular rate and rhythm with normal S1/S2 without murmurs, rubs or gallops. Abdomen: Soft, non-tender, non-distended with normal bowel sounds. Musculoskeletal:  No clubbing, cyanosis or edema bilaterally. Full range of motion without deformity. Skin: Skin color, texture, turgor normal.  No rashes or lesions. Neurologic:  Neurovascularly intact without any focal sensory/motor deficits. Cranial nerves: II-XII intact, grossly non-focal.  Psychiatric:  Alert and oriented, thought content appropriate, normal insight  Capillary Refill: Brisk,< 3 seconds   Peripheral Pulses: +2 palpable, equal bilaterally       Labs:  For convenience and continuity at follow-up the following most recent labs are provided:      CBC:    Lab Results   Component Value Date    WBC 4.6 03/08/2022    HGB 13.6 03/08/2022    HCT 39.8 03/08/2022     03/08/2022       Renal:    Lab Results   Component Value Date     03/10/2022    K 3.3 03/10/2022     03/10/2022    CO2 26 03/10/2022    BUN 18 03/10/2022    CREATININE 1.2 03/10/2022    CALCIUM 9.2 03/10/2022         Significant Diagnostic Studies    Radiology:   XR ABDOMEN (KUB) (SINGLE AP VIEW)   Preliminary Result   Nonspecific nonobstructive bowel gas pattern with gas extending to the   rectum. Apparent resolved or decreased stool burden in the rectum from CT   comparison dated 03/08/2022. CT ABDOMEN PELVIS WO CONTRAST Additional Contrast? None   Final Result   Significant amount of stool in the rectum, otherwise, no significant stool   burden seen in the colon      Status post cholecystectomy      Otherwise, no acute abnormalities seen in the abdomen or pelvis         XR ABDOMEN (KUB) (SINGLE AP VIEW)   Final Result   Significant stool burden in the rectum         XR ABDOMEN (KUB) (SINGLE AP VIEW)    (Results Pending)          Consults:     IP CONSULT TO GI  IP CONSULT TO HOSPITALIST    Disposition:  Home    Condition at Discharge: Stable    Discharge Instructions/Follow-up:  GI prn, PCP    Code Status:  Limited    Activity: activity as tolerated    Diet: regular diet      Discharge Medications:     Current Discharge Medication List           Details   hydrocortisone (ANUSOL-HC) 25 MG suppository Place 1 suppository rectally 2 times daily  Qty: 30 suppository, Refills: 0      menthol-zinc oxide (CALMOSEPTINE) 0.44-20.6 % OINT ointment Apply topically 2 times daily as needed (irritation to bottom) Max 30 ml per day.   Qty: 1 each, Refills: 4      polyethylene glycol (GLYCOLAX) 17 g packet Take 17 g by mouth daily as needed for Constipation  Qty: 527 g, Refills: 1              Details   PARoxetine (PAXIL) 10 MG tablet Take 10 mg by mouth every morning      losartan (COZAAR) 50 MG tablet TAKE ONE TABLET BY MOUTH DAILY  Qty: 90 tablet, Refills: 3    Associated Diagnoses: Essential hypertension      metoprolol succinate (TOPROL XL) 100 MG extended release tablet TAKE ONE TABLET BY MOUTH ONCE NIGHTLY  Qty: 90 tablet, Refills: 1    Associated Diagnoses: Essential hypertension      amLODIPine (NORVASC) 5 MG tablet Take 1 tablet by mouth daily  Qty: 90 tablet, Refills: 3    Associated Diagnoses: Primary hypertension      atorvastatin (LIPITOR) 40 MG tablet TAKE ONE TABLET BY MOUTH DAILY  Qty: 90 tablet, Refills: 3    Associated Diagnoses: Uncontrolled type 2 diabetes mellitus with hyperglycemia (HCC)      fenofibrate (TRIGLIDE) 160 MG tablet TAKE ONE TABLET BY MOUTH DAILY  Qty: 90 tablet, Refills: 3    Associated Diagnoses: Mixed hyperlipidemia      Multiple Vitamins-Minerals (THERAPEUTIC MULTIVITAMIN-MINERALS) tablet Take 1 tablet by mouth daily      montelukast (SINGULAIR) 10 MG tablet TAKE ONE TABLET BY MOUTH ONCE NIGHTLY  Qty: 90 tablet, Refills: 1      Omeprazole Magnesium (PRILOSEC OTC PO) Take by mouth      Blood Pressure KIT 1 kit by Does not apply route daily  Qty: 1 kit, Refills: 0    Associated Diagnoses: Essential hypertension      triamterene-hydroCHLOROthiazide (MAXZIDE-25) 37.5-25 MG per tablet TAKE ONE TABLET BY MOUTH DAILY  Qty: 90 tablet, Refills: 1      Probiotic Product (PROBIOTIC DAILY PO) Take by mouth      aspirin 81 MG EC tablet Take 81 mg by mouth every evening. estradiol (ESTRACE VAGINAL) 0.1 MG/GM vaginal cream Place 2 g vaginally daily for 14 days, THEN 2 g Twice a Week for 16 days. Qty: 42.5 g, Refills: 0    Associated Diagnoses: Atrophic vaginitis             Time Spent on discharge is more than 30 minutes in the examination, evaluation, counseling and review of medications and discharge plan. Signed:    Tiff Diaz MD   3/10/2022      Thank you Mariajose Fitzgerald MD for the opportunity to be involved in this patient's care. If you have any questions or concerns please feel free to contact me at [phone].

## 2022-03-11 NOTE — TELEPHONE ENCOUNTER
[facility] Transitions Initial Follow Up Call    Outreach made within 2 business days of discharge: Yes    Patient: Dom Vergara Patient : 1940   MRN: <[id number]>  Reason for Admission: There are no discharge diagnoses documented for the most recent discharge. Discharge Date: 3/10/22       Spoke with: patient    Discharge department/facility: [facility]    Non-face-to-face services provided:  Scheduled appointment with PCP-3/17/22  Obtained and reviewed discharge summary and/or continuity of care documents  Education of patient/family/caregiver/guardian to support self-management-Recommended patient continue liquids, light foods like chicken noodle soup, jellos. Advised to try increasing fiber intake. Also discussed the use of a Sitz Bath to help her  Assessment and support for treatment adherence and medication management-Patient taking all medications as ordered    TCM Interactive Patient Contact:  Was patient able to fill all prescriptions: Yes  Was patient instructed to bring all medications to the follow-up visit: Yes  Is patient taking all medications as directed in the discharge summary? Yes  Does patient understand their discharge instructions: Yes    Does patient have questions or concerns that need addressed prior to 7-14 day follow up office visit: yes - Patient states she is in a lot of pain and continues to have difficulty passing stool. She says she is very swollen and experiences bleeding with BM. She says her hemorrhoids are very inflamed. Patient is using suppository, cream, and oral laxative. Patient asking if there is anything else you could suggest to help with the pain or to help the inflammation. I did discuss trying a Sitz bath with her.  Please advise     Scheduled appointment with PCP within 7-14 days    Follow Up  Future Appointments   Date Time Provider Jones Berg   2022 10:30 AM MD TOBIAS Sebastian  [place] - [facility]   2022 10:00 AM MD TOBIAS Sebastian  [place] - [facility]   8/16/2022  1:00 PM MD Graciela [place] [address] [facility],

## 2022-03-14 ENCOUNTER — CARE COORDINATION (OUTPATIENT)
Dept: CASE MANAGEMENT | Age: 82
End: 2022-03-14

## 2022-03-14 NOTE — CARE COORDINATION
[facility] Transitions Initial Follow Up Call    Call within 2 business days of discharge: Yes    Patient: Prashanth Bean Patient : 1940   MRN: [id number]  Reason for Admission: fecal impation  Discharge Date: 3/10/22 RARS: Readmission Risk Score: 11.4 ( )      Last Discharge [facility]       Complaint Diagnosis Description Type Department Provider    3/8/22 Fecal Impaction JELANI (acute kidney injury) ([facility] [facility] . .. ED to Hosp-Admission (Discharged) (ADMITTED) Renzo Todd MD; [place]. .. Spoke with: antwan    Second and final attempt made to reach patient for initial post hospital transition call. VM left stating purpose of call along with my contact information requesting a return call.       Care Transitions 24 Hour Call    Care Transitions Interventions         Follow Up  Future Appointments   Date Time Provider Jones Berg   3/17/2022  1:00 PM MD TOBIAS Silva  [place] - D[facility]D   2022 10:30 AM MD TOBIAS Silva  [place] - [facility]   2022 10:00 AM MD TOBIAS Silva  [place] - [facility]   2022  1:00 PM MD Kendal Faulkner [facility]       Rosalee Reddy RN

## 2022-03-14 NOTE — TELEPHONE ENCOUNTER
What kind of suppository is she using? I can send one in with steroid in it, which can be helpful. What kind of laxative? I can see she is scheduled for this week, so will address at the appointment as well.

## 2022-03-17 ENCOUNTER — OFFICE VISIT (OUTPATIENT)
Dept: FAMILY MEDICINE CLINIC | Age: 82
End: 2022-03-17
Payer: MEDICARE

## 2022-03-17 VITALS
HEIGHT: 62 IN | TEMPERATURE: 97.6 F | HEART RATE: 60 BPM | OXYGEN SATURATION: 96 % | WEIGHT: 127 LBS | SYSTOLIC BLOOD PRESSURE: 126 MMHG | DIASTOLIC BLOOD PRESSURE: 62 MMHG | BODY MASS INDEX: 23.37 KG/M2

## 2022-03-17 DIAGNOSIS — E78.2 MIXED HYPERLIPIDEMIA: ICD-10-CM

## 2022-03-17 DIAGNOSIS — N17.9 AKI (ACUTE KIDNEY INJURY) (HCC): ICD-10-CM

## 2022-03-17 DIAGNOSIS — K59.00 CONSTIPATION, UNSPECIFIED CONSTIPATION TYPE: ICD-10-CM

## 2022-03-17 DIAGNOSIS — E83.42 HYPOMAGNESEMIA: ICD-10-CM

## 2022-03-17 DIAGNOSIS — K56.41 FECAL IMPACTION (HCC): Primary | ICD-10-CM

## 2022-03-17 DIAGNOSIS — K64.9 HEMORRHOIDS, UNSPECIFIED HEMORRHOID TYPE: ICD-10-CM

## 2022-03-17 DIAGNOSIS — K62.89 RECTAL PAIN: ICD-10-CM

## 2022-03-17 PROCEDURE — 99496 TRANSJ CARE MGMT HIGH F2F 7D: CPT | Performed by: FAMILY MEDICINE

## 2022-03-17 PROCEDURE — 1111F DSCHRG MED/CURRENT MED MERGE: CPT | Performed by: FAMILY MEDICINE

## 2022-03-17 SDOH — ECONOMIC STABILITY: FOOD INSECURITY: WITHIN THE PAST 12 MONTHS, YOU WORRIED THAT YOUR FOOD WOULD RUN OUT BEFORE YOU GOT MONEY TO BUY MORE.: NEVER TRUE

## 2022-03-17 SDOH — ECONOMIC STABILITY: HOUSING INSECURITY: IN THE LAST 12 MONTHS, HOW MANY PLACES HAVE YOU LIVED?: 1

## 2022-03-17 SDOH — ECONOMIC STABILITY: HOUSING INSECURITY
IN THE LAST 12 MONTHS, WAS THERE A TIME WHEN YOU DID NOT HAVE A STEADY PLACE TO SLEEP OR SLEPT IN A SHELTER (INCLUDING NOW)?: NO

## 2022-03-17 SDOH — ECONOMIC STABILITY: INCOME INSECURITY: IN THE LAST 12 MONTHS, WAS THERE A TIME WHEN YOU WERE NOT ABLE TO PAY THE MORTGAGE OR RENT ON TIME?: NO

## 2022-03-17 SDOH — ECONOMIC STABILITY: TRANSPORTATION INSECURITY
IN THE PAST 12 MONTHS, HAS THE LACK OF TRANSPORTATION KEPT YOU FROM MEDICAL APPOINTMENTS OR FROM GETTING MEDICATIONS?: NO

## 2022-03-17 SDOH — ECONOMIC STABILITY: FOOD INSECURITY: WITHIN THE PAST 12 MONTHS, THE FOOD YOU BOUGHT JUST DIDN'T LAST AND YOU DIDN'T HAVE MONEY TO GET MORE.: NEVER TRUE

## 2022-03-17 SDOH — ECONOMIC STABILITY: TRANSPORTATION INSECURITY
IN THE PAST 12 MONTHS, HAS LACK OF TRANSPORTATION KEPT YOU FROM MEETINGS, WORK, OR FROM GETTING THINGS NEEDED FOR DAILY LIVING?: NO

## 2022-03-17 ASSESSMENT — SOCIAL DETERMINANTS OF HEALTH (SDOH): HOW HARD IS IT FOR YOU TO PAY FOR THE VERY BASICS LIKE FOOD, HOUSING, MEDICAL CARE, AND HEATING?: NOT HARD AT ALL

## 2022-03-18 LAB
ALBUMIN SERPL-MCNC: 4.3 G/DL (ref 3.4–5)
ALP BLD-CCNC: 48 U/L (ref 40–129)
ALT SERPL-CCNC: 23 U/L (ref 10–40)
ANION GAP SERPL CALCULATED.3IONS-SCNC: 14 MMOL/L (ref 3–16)
AST SERPL-CCNC: 28 U/L (ref 15–37)
BILIRUB SERPL-MCNC: 0.4 MG/DL (ref 0–1)
BILIRUBIN DIRECT: <0.2 MG/DL (ref 0–0.3)
BILIRUBIN, INDIRECT: NORMAL MG/DL (ref 0–1)
BUN BLDV-MCNC: 22 MG/DL (ref 7–20)
CALCIUM SERPL-MCNC: 10.2 MG/DL (ref 8.3–10.6)
CHLORIDE BLD-SCNC: 101 MMOL/L (ref 99–110)
CHOLESTEROL, TOTAL: 133 MG/DL (ref 0–199)
CO2: 25 MMOL/L (ref 21–32)
CREAT SERPL-MCNC: 1.6 MG/DL (ref 0.6–1.2)
GFR AFRICAN AMERICAN: 38
GFR NON-AFRICAN AMERICAN: 31
GLUCOSE BLD-MCNC: 100 MG/DL (ref 70–99)
HDLC SERPL-MCNC: 31 MG/DL (ref 40–60)
LDL CHOLESTEROL CALCULATED: 49 MG/DL
MAGNESIUM: 1.4 MG/DL (ref 1.8–2.4)
POTASSIUM SERPL-SCNC: 4 MMOL/L (ref 3.5–5.1)
SODIUM BLD-SCNC: 140 MMOL/L (ref 136–145)
TOTAL PROTEIN: 6.5 G/DL (ref 6.4–8.2)
TRIGL SERPL-MCNC: 263 MG/DL (ref 0–150)
TSH REFLEX: 0.76 UIU/ML (ref 0.27–4.2)
VLDLC SERPL CALC-MCNC: 53 MG/DL

## 2022-03-21 ENCOUNTER — TELEPHONE (OUTPATIENT)
Dept: SURGERY | Age: 82
End: 2022-03-21

## 2022-03-21 ASSESSMENT — ENCOUNTER SYMPTOMS
VOMITING: 0
CONSTIPATION: 1
RECTAL PAIN: 1
NAUSEA: 0
ANAL BLEEDING: 1
DIARRHEA: 1
ABDOMINAL PAIN: 0

## 2022-03-21 NOTE — PROGRESS NOTES
Post-Discharge Transitional Care Follow Up      Elena Maier   YOB: 1940    Date of Office Visit:  3/17/2022  Date of Hospital Admission: 3/8/22  Date of Hospital Discharge: 3/10/22  Readmission Risk Score (high >=14%. Medium >=10%):Readmission Risk Score: 11.4 ( )      Care management risk score Rising risk (score 2-5) and Complex Care (Scores >=6): 3     Non face to face  following discharge, date last encounter closed (first attempt may have been earlier): 3/14/2022  2:44 PM     Call initiated 2 business days of discharge: Yes     Fecal impaction ([address]  -     Basic Metabolic Panel  -     TSH with Reflex  Check for underlying causes of severe constipation. Continue daily Miralax. Rectal pain  -     Jj Louis MD, Colorectal Surgery, [place]  Hemorrhoids, unspecified hemorrhoid type  -     Jj Louis MD, Colorectal Surgery, [facility]  She would like a consultation to learn about definitive surgical treatment. Constipation, unspecified constipation type  -     Basic Metabolic Panel  -     TSH with Reflex  -     Magnesium  JELANI (acute kidney injury) ([address]  -     Basic Metabolic Panel  Recheck to ensure this is still improved as she has restarted losartan and Maxzide at hospital discharge. Hypomagnesemia  -     Magnesium  Mixed hyperlipidemia  -     TSH with Reflex      Medical Decision Making: high complexity  Return in about 3 months (around 6/17/2022) for Diabetes, Hypertension, CKD.            Subjective:   ABDON Parker Ace is an [de-identified] y.o. female who presented to [facility] with severe rectal pain, constipation.  Patient reported several bouts of diarrhea for 2 days prior to the rectal pain, but kept feeling like something wasn't coming.  She then developed constipation with small bloody stool with straining and severe, stabbing rectal pain.   She has a history anal fissure, but states she never had hemorrhoids until this happened, but now has external hemorrhoids. She was very concerned as her mother  of a bowel obstruction, and decided to go to the hospital. While there, she was found to have acute on chronic kidney failure. Losartan and Maxzide were held, and creatinine improved. For her fecal impaction, digital disimpaction was tried but was unsuccessful. She was given an enema, which helped. She was given Anusol and calmoseptine to use at home for hemorrhoids and rectal pain, and advised to use Miralax daily at home. Magnesium was found to be low while there as well. Inpatient course: Discharge summary reviewed- see chart. Interval history/Current status: Slowing improving. Still having significant rectal pain, but it has improved since when she was in the hospital. Has now had 2 normal BMs that were sizable and normal consistency on current regimen.      Patient Active Problem List   Diagnosis    Primary osteoarthritis of right knee    Chronic kidney disease, stage III (moderate) ([facility]    GERD (gastroesophageal reflux disease)    Extrinsic asthma    Hyperlipidemia    Hypertension    Insomnia    Chronic insomnia    Osteopenia    Uncontrolled type 2 diabetes mellitus with hyperglycemia ([address]    DM type 2 with diabetic dyslipidemia ([address]    Diabetic retinopathy of both eyes ([address]    Carotid stenosis    Other chest pain    SOB (shortness of breath)    Abnormal ECG    Coronary artery disease involving native coronary artery of native heart without angina pectoris    Fecal impaction in rectum (HCC)    Acute kidney injury superimposed on CKD ([address]    Fecal impaction (HCC)       Medications listed as ordered at the time of discharge from hospital  YES    Medications marked \"taking\" at this time  Outpatient Medications Marked as Taking for the 3/17/22 encounter (Office Visit) with Barbara Holley MD   Medication Sig Dispense Refill    polyethylene glycol (GLYCOLAX) 17 g packet Take 17 g by mouth daily as needed for Constipation 527 g 1    PARoxetine (PAXIL) 10 MG tablet Take 10 mg by mouth every morning      losartan (COZAAR) 50 MG tablet TAKE ONE TABLET BY MOUTH DAILY 90 tablet 3    metoprolol succinate (TOPROL XL) 100 MG extended release tablet TAKE ONE TABLET BY MOUTH ONCE NIGHTLY 90 tablet 1    amLODIPine (NORVASC) 5 MG tablet Take 1 tablet by mouth daily 90 tablet 3    atorvastatin (LIPITOR) 40 MG tablet TAKE ONE TABLET BY MOUTH DAILY 90 tablet 3    fenofibrate (TRIGLIDE) 160 MG tablet TAKE ONE TABLET BY MOUTH DAILY 90 tablet 3    Multiple Vitamins-Minerals (THERAPEUTIC MULTIVITAMIN-MINERALS) tablet Take 1 tablet by mouth daily      montelukast (SINGULAIR) 10 MG tablet TAKE ONE TABLET BY MOUTH ONCE NIGHTLY 90 tablet 1    Omeprazole Magnesium (PRILOSEC OTC PO) Take by mouth      triamterene-hydroCHLOROthiazide (MAXZIDE-25) 37.5-25 MG per tablet TAKE ONE TABLET BY MOUTH DAILY 90 tablet 1    Probiotic Product (PROBIOTIC DAILY PO) Take by mouth      aspirin 81 MG EC tablet Take 81 mg by mouth every evening. Medications patient taking as of now reconciled against medications ordered at time of hospital discharge: Yes    Review of Systems   Constitutional: Negative for unexpected weight change. Gastrointestinal: Positive for anal bleeding, constipation, diarrhea and rectal pain. Negative for abdominal pain, nausea and vomiting. Psychiatric/Behavioral: The patient is nervous/anxious. Objective:    /62 (Site: Left Upper Arm, Position: Sitting, Cuff Size: Small Adult)   Pulse 60   Temp 97.6 °F (36.4 °C) (Oral)   Ht 5' 2\" (1.575 m)   Wt 127 lb (57.6 kg)   SpO2 96% Comment: RA  BMI 23.23 kg/m²   Physical Exam  Vitals and nursing note reviewed. Constitutional:       General: She is not in acute distress. Appearance: She is well-developed. She is not diaphoretic. HENT:      Head: Normocephalic and atraumatic. Eyes:      General: No scleral icterus.      Conjunctiva/sclera: Conjunctivae normal. Cardiovascular:      Rate and Rhythm: Normal rate and regular rhythm. Heart sounds: Normal heart sounds. No murmur heard. No friction rub. No gallop. Pulmonary:      Effort: Pulmonary effort is normal. No respiratory distress. Breath sounds: Normal breath sounds. No wheezing or rales. Abdominal:      General: Bowel sounds are normal. There is no distension. Palpations: Abdomen is soft. There is no mass. Tenderness: There is no abdominal tenderness. There is no guarding or rebound. Genitourinary:     Rectum: External hemorrhoid present. No anal fissure. Neurological:      Mental Status: She is alert. An electronic signature was used to authenticate this note.   --Devonte Husain MD

## 2022-03-21 NOTE — TELEPHONE ENCOUNTER
Referral for K62.89 (ICD-10-CM) - Rectal pain to see Dr. Royer Yao    Referral work que    Left message for return call.

## 2022-03-31 RX ORDER — PAROXETINE HYDROCHLORIDE 20 MG/1
TABLET, FILM COATED ORAL
Qty: 30 TABLET | Refills: 0 | OUTPATIENT
Start: 2022-03-31

## 2022-03-31 NOTE — TELEPHONE ENCOUNTER
Refill Request     Last Seen: Last Seen Department: 3/17/2022  Last Seen by PCP: 3/17/2022    Last Written: Historical Provider     Next Appointment:   Future Appointments   Date Time Provider Jones Berg   4/1/2022 11:00 AM Padmini Das MD [facility] COLON RECT [facility]   6/27/2022 10:00 AM MD TOBIAS Garnica  [place] - [facility]   8/16/2022  1:00 PM MD Lena Bowen [facility]       Future appointment scheduled      Requested Prescriptions     Pending Prescriptions Disp Refills    PARoxetine (PAXIL) 20 MG tablet [Pharmacy Med Name: PARoxetine HCL 20 MG TABLET] 30 tablet 0     Sig: TAKE ONE TABLET BY MOUTH DAILY

## 2022-04-01 ENCOUNTER — OFFICE VISIT (OUTPATIENT)
Dept: SURGERY | Age: 82
End: 2022-04-01
Payer: MEDICARE

## 2022-04-01 VITALS
HEART RATE: 55 BPM | HEIGHT: 62 IN | RESPIRATION RATE: 16 BRPM | TEMPERATURE: 97.8 F | OXYGEN SATURATION: 97 % | DIASTOLIC BLOOD PRESSURE: 57 MMHG | WEIGHT: 128.2 LBS | SYSTOLIC BLOOD PRESSURE: 125 MMHG | BODY MASS INDEX: 23.59 KG/M2

## 2022-04-01 DIAGNOSIS — K59.00 CONSTIPATION, UNSPECIFIED CONSTIPATION TYPE: Primary | ICD-10-CM

## 2022-04-01 PROCEDURE — 99203 OFFICE O/P NEW LOW 30 MIN: CPT | Performed by: SURGERY

## 2022-04-01 RX ORDER — PAROXETINE HYDROCHLORIDE 20 MG/1
20 TABLET, FILM COATED ORAL DAILY
Qty: 90 TABLET | Refills: 1 | Status: SHIPPED | OUTPATIENT
Start: 2022-04-01 | End: 2022-09-28

## 2022-04-01 NOTE — PROGRESS NOTES
Subjective:     Patient is a [de-identified] y.o. woman with rectal pain and prior fecal impaction    The patient is referred by Dr. Leah Jones MD. Results of consultation will be shared via electronic medical record    HPI: Ms. Suzette Rhodes reports recent bout of fecal impaction resulted in the rectal pain and a two day hospitalization. SHe had stopped taking her Miralax and the constipation developed. She was seen by GI Dr. Irma Treviño as an inpatient. She is now passing stool regularly and is on a bowel regimen. She had a colonoscopy 2016 and follows with Dr. Jose Angel Chandra. She reports having anal fissure in the past twice and treated successfully.     Patient Active Problem List    Diagnosis Date Noted    Fecal impaction ([address] 03/09/2022    Fecal impaction in rectum ([address] 03/08/2022    Acute kidney injury superimposed on CKD ([address] 03/08/2022    Coronary artery disease involving native coronary artery of native heart without angina pectoris 01/03/2022    SOB (shortness of breath)     Abnormal ECG     Other chest pain 11/29/2021    Carotid stenosis 11/11/2021    Diabetic retinopathy of both eyes ([address]     Uncontrolled type 2 diabetes mellitus with hyperglycemia ([address] 02/05/2019    DM type 2 with diabetic dyslipidemia ([address] 02/05/2019    GERD (gastroesophageal reflux disease) 01/29/2019    Extrinsic asthma 01/29/2019    Hyperlipidemia 01/29/2019    Hypertension 01/29/2019    Osteopenia 01/29/2019    Insomnia     Chronic insomnia     Chronic kidney disease, stage III (moderate) ([address] 05/17/2017    Primary osteoarthritis of right knee 03/03/2017     Past Medical History:   Diagnosis Date    Anal fissure 01/07/2013    Back pain     Carotid stenosis 11/11/2021    Chronic insomnia     Chronic kidney disease, stage III (moderate) ([address] 5/17/2017    Depression     Diabetic retinopathy of both eyes ([address]     DM type 2 with diabetic dyslipidemia ([address] 2/5/2019    GERD (gastroesophageal reflux disease)     Dr. Jose Angel Chandra (GI)   Johnathan Glossitis 03/16/2017    Hearing loss     Hypercholesteremia     Hypertension     Lumbar radiculopathy 03/03/2017    Osteopenia 1/29/2019    Patellofemoral stress syndrome of right knee 03/03/2017      Past Surgical History:   Procedure Laterality Date    APPENDECTOMY      CHOLECYSTECTOMY      CHOLECYSTECTOMY      COLONOSCOPY  1/18/2016    Normal    HYSTERECTOMY, TOTAL ABDOMINAL      KNEE SURGERY  left knee    OVARY REMOVAL      unknown which side    UPPER GASTROINTESTINAL ENDOSCOPY  1/18/2016    Normal      Not in a hospital admission. Allergies   Allergen Reactions    Adhesive Tape      Band aid caused skin tears in past     Codeine Other (See Comments)     Felt strange    Iodides      Some issues with kidneys- told not to have per PCP     Sulfa Antibiotics      Pt can't remember reaction    Penicillins Rash      Social History     Tobacco Use    Smoking status: Never Smoker    Smokeless tobacco: Never Used   Substance Use Topics    Alcohol use: Yes     Comment: occassionally      Family History   Problem Relation Age of Onset    Stroke Father     Heart Disease Brother     High Blood Pressure Other       Review of Systems    GEN: reviewed and negative except as noted in HPI. GI: reviewed and negative except as noted in HPI. Imaging : CT: 3-    \"  Significant amount of stool in the rectum, otherwise, no significant stool   burden seen in the colon       Status post cholecystectomy       Otherwise, no acute abnormalities seen in the abdomen or pelvis   \"      Objective:     GEN: appears well, no distress, appears stated age  PSYCH: normal mood, normal affect  NECK: no neck masses, trachea midline  ENT: moist oral mucosa; anicteric  SKIN: no rash or jaundice  CV: regular heart rate and rhythm  PULM: normal respiratory effort, no wheezing  GI: soft non tender abdomen. Normal bowel sounds  RECTAL: healing fissure posterior midline. Digital deferred to not aggravate.  Exam with L Claudia RN  EXT/NEURO: normal gait, strength/sensation grossly intact in all extremities      Assessment:     Fecal impaction   Healed fissure     Plan:     Discussed likely she had some hemorrhoid or fissure pain from the disimpaction/constipation. This is now improved.  Continue bowel regimen and see me as needed    Chris Adorno M.D.  4/1/22   11:22 AM

## 2022-04-11 RX ORDER — TRIAMTERENE AND HYDROCHLOROTHIAZIDE 37.5; 25 MG/1; MG/1
TABLET ORAL
Qty: 90 TABLET | Refills: 1 | Status: SHIPPED | OUTPATIENT
Start: 2022-04-11 | End: 2022-09-28

## 2022-04-11 NOTE — TELEPHONE ENCOUNTER
Refill Request     Last Seen: Last Seen Department: 3/17/2022  Last Seen by PCP: 3/17/2022    Last Written: 10/6/21    Next Appointment:   Future Appointments   Date Time Provider Jones Berg   6/27/2022 10:00 AM Yuni Brown MD [facility] [place] [facility]   8/16/2022  1:00 PM Marbin Recinos MD [facility]       Future appointment scheduled      Requested Prescriptions     Pending Prescriptions Disp Refills    triamterene-hydroCHLOROthiazide (MAXZIDE-25) 37.5-25 MG per tablet 90 tablet 1     Sig: TAKE ONE TABLET BY MOUTH DAILY

## 2022-05-20 RX ORDER — MONTELUKAST SODIUM 10 MG/1
TABLET ORAL
Qty: 90 TABLET | Refills: 1 | Status: SHIPPED | OUTPATIENT
Start: 2022-05-20 | End: 2022-10-06

## 2022-05-20 NOTE — TELEPHONE ENCOUNTER
Refill Request     CONFIRM preferrred pharmacy with the patient. If Mail Order Rx - Pend for 90 day refill.       Last Seen: Last Seen Department: 3/17/2022  Last Seen by PCP: 3/17/2022    Last Written: 11/19/2021 90 with 1    Next Appointment:   Future Appointments   Date Time Provider Jones Berg   6/27/2022 10:00 AM MD TOBIAS Ambrocio  [place] [facility] [place]   8/16/2022  1:00 PM Annie Álvarez MD [facility]       Future appointment scheduled      Requested Prescriptions     Pending Prescriptions Disp Refills    montelukast (SINGULAIR) 10 MG tablet 90 tablet 1     Sig: TAKE ONE TABLET BY MOUTH ONCE NIGHTLY

## 2022-06-27 ENCOUNTER — OFFICE VISIT (OUTPATIENT)
Dept: FAMILY MEDICINE CLINIC | Age: 82
End: 2022-06-27

## 2022-06-27 VITALS
DIASTOLIC BLOOD PRESSURE: 60 MMHG | OXYGEN SATURATION: 96 % | HEIGHT: 63 IN | SYSTOLIC BLOOD PRESSURE: 120 MMHG | WEIGHT: 130 LBS | BODY MASS INDEX: 23.04 KG/M2 | HEART RATE: 56 BPM

## 2022-06-27 DIAGNOSIS — E11.65 UNCONTROLLED TYPE 2 DIABETES MELLITUS WITH HYPERGLYCEMIA (HCC): ICD-10-CM

## 2022-06-27 DIAGNOSIS — J06.9 VIRAL URI: ICD-10-CM

## 2022-06-27 DIAGNOSIS — Z00.00 MEDICARE ANNUAL WELLNESS VISIT, SUBSEQUENT: Primary | ICD-10-CM

## 2022-06-27 LAB — HBA1C MFR BLD: 7.7 %

## 2022-06-27 RX ORDER — LORATADINE 10 MG/1
10 TABLET ORAL DAILY
Qty: 30 TABLET | Refills: 0 | Status: SHIPPED | OUTPATIENT
Start: 2022-06-27 | End: 2022-07-27

## 2022-06-27 ASSESSMENT — PATIENT HEALTH QUESTIONNAIRE - PHQ9
SUM OF ALL RESPONSES TO PHQ QUESTIONS 1-9: 2
2. FEELING DOWN, DEPRESSED OR HOPELESS: 1
1. LITTLE INTEREST OR PLEASURE IN DOING THINGS: 1
SUM OF ALL RESPONSES TO PHQ9 QUESTIONS 1 & 2: 2
SUM OF ALL RESPONSES TO PHQ QUESTIONS 1-9: 2

## 2022-06-27 ASSESSMENT — LIFESTYLE VARIABLES
HOW MANY STANDARD DRINKS CONTAINING ALCOHOL DO YOU HAVE ON A TYPICAL DAY: 1 OR 2
HOW OFTEN DO YOU HAVE A DRINK CONTAINING ALCOHOL: 2-4 TIMES A MONTH

## 2022-06-27 NOTE — PROGRESS NOTES
Medicare Annual Wellness Visit    Daisy Contreras is here for Medicare AWV, Cough (x 3 weeks), and Cerumen Impaction (left, saw ENT)    Assessment & Plan   Medicare annual wellness visit, subsequent  See below  Uncontrolled type 2 diabetes mellitus with hyperglycemia ([address]  -     POCT glycosylated hemoglobin (Hb A1C)  Viral URI  Improving. Continue symptomatic care with Mucinex and Tessalon. Discussed I do not see a reason for her to start doxycycline at this time. Call if symptoms last beyond 3 weeks. Recommendations for Preventive Services Due: see orders and patient instructions/AVS.  Recommended screening schedule for the next 5-10 years is provided to the patient in written form: see Patient Instructions/AVS.     No follow-ups on file. Subjective   The following acute and/or chronic problems were also addressed today:  Type 2 diabetes, cough x 2.5 weeks. Upper Respiratory Infection: Patient complains of symptoms of a URI. Symptoms include cough. Onset of symptoms was 2 weeks ago, gradually improving since that time. Evaluation to date: saw ENT, given doxycycline but she didn't take it. Also given Tessalon and taking Mucinex.  has been ill with the same symptoms. Patient's complete Health Risk Assessment and screening values have been reviewed and are found in Flowsheets. The following problems were reviewed today and where indicated follow up appointments were made and/or referrals ordered.     Positive Risk Factor Screenings with Interventions:             General Health and ACP:  General  In general, how would you say your health is?: Good  In the past 7 days, have you experienced any of the following: New or Increased Pain, New or Increased Fatigue, Loneliness, Social Isolation, Stress or Anger?: No  Do you get the social and emotional support that you need?: Yes  Do you have a Living Will?: Yes    Advance Directives     Power of  Living Will ACP-Advance Directive ACP-Power of     Not on File Not on File Not on File Not on File      General Health Risk Interventions:  · No Living Will: ACP documents already completed- patient asked to provide copy to the office    Health Habits/Nutrition:     Physical Activity: Inactive    Days of Exercise per Week: 0 days    Minutes of Exercise per Session: 0 min     Have you lost any weight without trying in the past 3 months?: No  Body mass index: 23.03  Have you seen the dentist within the past year?: Yes    Health Habits/Nutrition Interventions:  · Inadequate physical activity:  patient is not ready to increase his/her physical activity level at this time    Hearing/Vision:  Do you or your family notice any trouble with your hearing that hasn't been managed with hearing aids?: (!) Yes  Do you have difficulty driving, watching TV, or doing any of your daily activities because of your eyesight?: No  Have you had an eye exam within the past year?: Appointment is scheduled  No exam data present    Hearing/Vision Interventions:  · Hearing concerns:  going to audiologist today            Objective   Vitals:    06/27/22 1001   BP: 120/60   Site: Left Upper Arm   Position: Sitting   Cuff Size: Medium Adult   Pulse: 56   SpO2: 96%   Weight: 130 lb (59 kg)   Height: 5' 3\" (1.6 m)      Body mass index is 23.03 kg/m².         General Appearance: alert and oriented to person, place and time, well-developed and well-nourished, in no acute distress  Pulmonary/Chest: clear to auscultation bilaterally- no wheezes, rales or rhonchi, normal air movement, no respiratory distress  Cardiovascular: normal rate, normal S1 and S2, no gallops, intact distal pulses and no carotid bruits  Abdomen: soft, non-tender, non-distended, normal bowel sounds, no masses or organomegaly  Extremities: no cyanosis and no clubbing       Allergies   Allergen Reactions    Adhesive Tape      Band aid caused skin tears in past     Codeine Other (See Comments)     Felt strange    Iodides      Some issues with kidneys- told not to have per PCP     Sulfa Antibiotics      Pt can't remember reaction    Penicillins Rash     Prior to Visit Medications    Medication Sig Taking? Authorizing Provider   montelukast (SINGULAIR) 10 MG tablet TAKE ONE TABLET BY MOUTH ONCE NIGHTLY Yes Joie Pardo MD   triamterene-hydroCHLOROthiazide (MAXZIDE-25) 37.5-25 MG per tablet TAKE ONE TABLET BY MOUTH DAILY Yes Joie Pardo MD   PARoxetine (PAXIL) 20 MG tablet Take 1 tablet by mouth daily Yes MARTINE Beltran CNP   losartan (COZAAR) 50 MG tablet TAKE ONE TABLET BY MOUTH DAILY Yes Mirela Fraire MD   metoprolol succinate (TOPROL XL) 100 MG extended release tablet TAKE ONE TABLET BY MOUTH ONCE NIGHTLY Yes Mirela Fraire MD   amLODIPine (NORVASC) 5 MG tablet Take 1 tablet by mouth daily Yes Mirela Fraire MD   atorvastatin (LIPITOR) 40 MG tablet TAKE ONE TABLET BY MOUTH DAILY Yes Mirela Fraire MD   fenofibrate (TRIGLIDE) 160 MG tablet TAKE ONE TABLET BY MOUTH DAILY Yes Mirela Fraire MD   Multiple Vitamins-Minerals (THERAPEUTIC MULTIVITAMIN-MINERALS) tablet Take 1 tablet by mouth daily Yes Historical Provider, MD   Omeprazole Magnesium (PRILOSEC OTC PO) Take by mouth Yes Historical Provider, MD   Probiotic Product (PROBIOTIC DAILY PO) Take by mouth Yes Historical Provider, MD   aspirin 81 MG EC tablet Take 81 mg by mouth every evening.  Yes Historical Provider, MD   hydrocortisone (ANUSOL-HC) 25 MG suppository Place 1 suppository rectally 2 times daily  Patient not taking: Reported on 3/17/2022  Petrona Cruz MD       [facility] (Including outside providers/suppliers regularly involved in providing care):   Patient Care Team:  Joie Pardo MD as PCP - General (Family Medicine)  Joie Pardo MD as PCP - [facility]     Reviewed and updated this visit:  Tobacco  Allergies  Meds  Problems  Med Hx  Surg Hx  Soc Hx  Fam Hx        Results for POC orders placed in visit on 06/27/22   POCT glycosylated hemoglobin (Hb A1C)   Result Value Ref Range    Hemoglobin A1C 7.7 %

## 2022-06-27 NOTE — PATIENT INSTRUCTIONS
Personalized Preventive Plan for Devante Smalls - 6/27/2022  Medicare offers a range of preventive health benefits. Some of the tests and screenings are paid in full while other may be subject to a deductible, co-insurance, and/or copay. Some of these benefits include a comprehensive review of your medical history including lifestyle, illnesses that may run in your family, and various assessments and screenings as appropriate. After reviewing your medical record and screening and assessments performed today your provider may have ordered immunizations, labs, imaging, and/or referrals for you. A list of these orders (if applicable) as well as your Preventive Care list are included within your After Visit Summary for your review. Other Preventive Recommendations:    · A preventive eye exam performed by an eye specialist is recommended every 1-2 years to screen for glaucoma; cataracts, macular degeneration, and other eye disorders. · A preventive dental visit is recommended every 6 months. · Try to get at least 150 minutes of exercise per week or 10,000 steps per day on a pedometer . · Order or download the FREE \"Exercise & Physical Activity: Your Everyday Guide\" from [employer]. Call 7-891.747.7920 or search [employer] online. · You need 7445-6588 mg of calcium and 6365-9281 IU of vitamin D per day. It is possible to meet your calcium requirement with diet alone, but a vitamin D supplement is usually necessary to meet this goal.  · When exposed to the sun, use a sunscreen that protects against both UVA and UVB radiation with an SPF of 30 or greater. Reapply every 2 to 3 hours or after sweating, drying off with a towel, or swimming. · Always wear a seat belt when traveling in a car. Always wear a helmet when riding a bicycle or motorcycle.

## 2022-08-16 ENCOUNTER — OFFICE VISIT (OUTPATIENT)
Dept: CARDIOLOGY CLINIC | Age: 82
End: 2022-08-16
Payer: MEDICARE

## 2022-08-16 VITALS
HEIGHT: 63 IN | WEIGHT: 128 LBS | HEART RATE: 63 BPM | DIASTOLIC BLOOD PRESSURE: 60 MMHG | OXYGEN SATURATION: 97 % | BODY MASS INDEX: 22.68 KG/M2 | SYSTOLIC BLOOD PRESSURE: 108 MMHG

## 2022-08-16 DIAGNOSIS — E78.2 MIXED HYPERLIPIDEMIA: Primary | ICD-10-CM

## 2022-08-16 DIAGNOSIS — R79.0 LOW MAGNESIUM LEVEL: ICD-10-CM

## 2022-08-16 DIAGNOSIS — I25.10 CORONARY ARTERY DISEASE INVOLVING NATIVE CORONARY ARTERY OF NATIVE HEART WITHOUT ANGINA PECTORIS: ICD-10-CM

## 2022-08-16 DIAGNOSIS — I10 PRIMARY HYPERTENSION: ICD-10-CM

## 2022-08-16 DIAGNOSIS — I65.22 STENOSIS OF LEFT CAROTID ARTERY: ICD-10-CM

## 2022-08-16 DIAGNOSIS — E11.65 UNCONTROLLED TYPE 2 DIABETES MELLITUS WITH HYPERGLYCEMIA (HCC): ICD-10-CM

## 2022-08-16 PROCEDURE — 99214 OFFICE O/P EST MOD 30 MIN: CPT | Performed by: INTERNAL MEDICINE

## 2022-08-16 PROCEDURE — 1123F ACP DISCUSS/DSCN MKR DOCD: CPT | Performed by: INTERNAL MEDICINE

## 2022-08-16 RX ORDER — ICOSAPENT ETHYL 1000 MG/1
2 CAPSULE ORAL 2 TIMES DAILY
Qty: 120 CAPSULE | Refills: 3 | Status: SHIPPED
Start: 2022-08-16 | End: 2022-08-25

## 2022-08-16 RX ORDER — ATORVASTATIN CALCIUM 40 MG/1
TABLET, FILM COATED ORAL
Qty: 90 TABLET | Refills: 3 | Status: SHIPPED | OUTPATIENT
Start: 2022-08-16 | End: 2022-08-25 | Stop reason: SDUPTHER

## 2022-08-16 NOTE — PATIENT INSTRUCTIONS
Plan:  1. Continue current medications   2. We will add Vascepa 2 gm twice a day to help lower triglycerides   3. Discuss stress and anxiety with Edwin Spence MD   4. Follow up with me in 6 months    5. Repeat fasting lipids in liver function test in 2 months   6.  Restart Lipitor when able

## 2022-08-16 NOTE — TELEPHONE ENCOUNTER
Refill Request     CONFIRM preferrred pharmacy with the patient. If Mail Order Rx - Pend for 90 day refill. Last Seen: Last Seen Department: 2022  Last Seen by PCP: 2022    Last Written: 22 90 with 3 (spoke with Pharmacy this script was never filled.  Was using previous refills and script from 22 has now  so patient needs new script sent to pharmacy)     Next Appointment:   Future Appointments   Date Time Provider Jones Berg   2022  1:00 PM MD Nany Michael [facility]       Future appointment scheduled      Requested Prescriptions     Pending Prescriptions Disp Refills    atorvastatin (LIPITOR) 40 MG tablet 90 tablet 3     Sig: TAKE ONE TABLET BY MOUTH DAILY

## 2022-08-16 NOTE — PROGRESS NOTES
[facility]   Cardiac Consultation    Referring Provider: Tosha Ortiz CNP. Chief Complaint   Patient presents with    Follow-up    Hyperlipidemia    Hypertension        SUBJECTIVE: Ms. Beth Porter has a PMH of CAD, HTN, HLD, DM II, CKD, GERD; c/o fatigue today. History of Present Illness:   Prashant Atkins is a [de-identified] y.o. female who [address] NOCAD dx 12/21, HTN, HLD, borderline DM, Left carotid disease, and GERD. He was initially seen as a new at the request of Dr. Deanna Jordan for fatigue, CAMILO, and abnormal EKG in 11/21. EKG 10/20/2021 SB, ST abnormality c/w possible anterolateral Ischemia, 56 BPM (ECG 3/2013 NO ST changes). GXT myoview 8/26/2010 negative for ischemia. Most recent ECHO 12/8/2021 EF=62%-  Frequent ventricular ectopy noted throughout the exam. Due to ongoing CAMILO and concern for anginal equivalent, multiple CAD RF's, and abnormal T inversion on EKG concerning for ischemia underwent [facility] with Dr. Jesus Osorio on 12/8/2021 showing NOCAD (20% and 40-50% LAD, 30% CCx, 30% RCA stenoses). Most recent carotid dopplers 2/9/2022, <50% stenosis right artery, 50-69% blockage left (no change 10/21). Most recent EKG 12/8/2021, SR, occasional PVC's, otherwise normal.   Admitted to the hospital 3/2022 with constipation and mild bloody stools. Digital disimpaction attempted in the ER but unsuccessful. Patient was treated for severe constipation and JELANI, followed by GI. Creatinine 1.6, treated with IV fluids. CT abdomen and pelvis 3/8/2022 showed stool in rectum, otherwise normal.     Today she is here for routine cardiology follow up. She states she has been off Lipitor for one week due to having issues with pharmacy supply. Her PCP has reordered. She reports having some fatigue since her hospital admission in March for her bowel blockage. Her bowel issues were painful and also a cause of stress and anxiety. She is having issues sleeping at night, goes to bed around 2:00 AM. She generally stays up and watches the news. tablet TAKE ONE TABLET BY MOUTH ONCE NIGHTLY 2/1/22  Yes Rosalee Cortez MD   amLODIPine [employer]) 5 MG tablet Take 1 tablet by mouth daily 2/1/22  Yes Rosalee Cortez MD   atorvastatin (LIPITOR) 40 MG tablet TAKE ONE TABLET BY MOUTH DAILY 2/1/22  Yes Rosalee Cortez MD   fenofibrate (TRIGLIDE) 160 MG tablet TAKE ONE TABLET BY MOUTH DAILY 2/1/22  Yes Rosalee Cortez MD   Multiple Vitamins-Minerals (THERAPEUTIC MULTIVITAMIN-MINERALS) tablet Take 1 tablet by mouth daily   Yes Historical Provider, MD   Probiotic Product (PROBIOTIC DAILY PO) Take by mouth   Yes Historical Provider, MD   aspirin 81 MG EC tablet Take 81 mg by mouth every evening. 12/31/10  Yes Historical Provider, MD   hydrocortisone (ANUSOL-HC) 25 MG suppository Place 1 suppository rectally 2 times daily  Patient not taking: No sig reported 3/10/22   Anne Singh MD   Omeprazole Magnesium (PRILOSEC OTC PO) Take by mouth  Patient not taking: Reported on 8/16/2022    Historical Provider, MD        Allergies:  Adhesive tape, Codeine, Iodides, Sulfa antibiotics, and Penicillins     Review of Systems:   Constitutional: there has been no unanticipated weight loss. There's been no change in energy level, sleep pattern, or activity level. Eyes: No visual changes or diplopia. No scleral icterus. ENT: No Headaches, hearing loss or vertigo. No mouth sores or sore throat. Cardiovascular: Reviewed in HPI  Respiratory: No cough or wheezing, no sputum production. No hematemesis. Gastrointestinal: No abdominal pain, appetite loss, blood in stools. No change in bowel or bladder habits. Genitourinary: No dysuria, trouble voiding, or hematuria. Musculoskeletal:  No gait disturbance, weakness or joint complaints. Integumentary: No rash or pruritis. Neurological: No headache, diplopia, change in muscle strength, numbness or tingling. No change in gait, balance, coordination, mood, affect, memory, mentation, behavior.   Psychiatric: No anxiety, no depression. Endocrine: No malaise, fatigue or temperature intolerance. No excessive thirst, fluid intake, or urination. No tremor. Hematologic/Lymphatic: No abnormal bruising or bleeding, blood clots or swollen lymph nodes. Allergic/Immunologic: No nasal congestion or hives. Physical Examination:    /60   Pulse 63   Ht 5' 3\" (1.6 m)   Wt 128 lb (58.1 kg)   SpO2 97%   BMI 22.67 kg/m²     Constitutional and General Appearance: NAD   Respiratory:  Normal excursion and expansion without use of accessory muscles  Resp Auscultation: Normal breath sounds without dullness  Cardiovascular: The apical impulses not displaced  Heart tones are crisp and normal, normal rhythm with occasional ectopy   Cervical veins are not engorged  +left carotid bruit   Normal S1S2, No S3, +soft SHELLI   Peripheral pulses are symmetrical and full  There is no clubbing, cyanosis of the extremities. No edema  Femoral Arteries: 2+ and equal  Pedal Pulses: 2+ and equal   Abdomen:  No masses or tenderness  Liver/Spleen: No Abnormalities Noted  Neurological/Psychiatric:  Alert and oriented in all spheres  Moves all extremities well  Exhibits normal gait balance and coordination  No abnormalities of mood, affect, memory, mentation, or behavior are noted  Skin:  Skin: warm and dry.     Lab Results   Component Value Date    CHOL 133 03/17/2022    CHOL 164 12/08/2021    CHOL 159 03/09/2021     Lab Results   Component Value Date    TRIG 263 (H) 03/17/2022    TRIG 309 (H) 12/08/2021    TRIG 286 (H) 03/09/2021     Lab Results   Component Value Date    HDL 31 (L) 03/17/2022    HDL 29 (L) 12/08/2021    HDL 25 (L) 03/09/2021     Lab Results   Component Value Date    LDLCALC 49 03/17/2022    [address] see below 12/08/2021    LDLCALC 77 03/09/2021     Lab Results   Component Value Date    LABVLDL 53 03/17/2022    LABVLDL see below 12/08/2021    LABVLDL 57 03/09/2021     No results found for: CHOLHDLRATIO    Labs- 3/17/2022 NA+ 140, K+ 4.0, BUN 25, Creatinine 1.6, mag 1.4, , HDL 31, LD 49, , ALT 23, AST 28, TSH .76, H/H       Assessment:     1. Mixed hyperlipidemia: I personally reviewed most recent lipids from 3/17/22 in Epic (see above). Note chronic low HLD and TG improved but still not at goal (263). Continue lipitor 40mg qd and fenofibrate 160mg daily. Will try to add vascepa 2gm BID if able to afford. 2. Primary hypertension: Well controlled and will continue current medical regimen. 3. Stenosis of left carotid artery: Most recent carotid dopplers 2/9/2022, <50% stenosis right artery, 50-69% blockage left (no change 10/21). Follow clinically and repeat study 2023. 4.      Abnormal EKG:  Most recent ECG 10/20/2021 SB, ST abnormality c/w possible anterolateral Ischemia, 56  BPM (ECG 3/2013 NO ST changes). Repeat EKG 12/21 NSR, PVC's; NST changes (prior T inversion  resolved). 5.      Coronary artery disease--LHC with Dr. Stoney Griffiths on 12/8/2021 which showed NOCAD (20% and 40-50% LAD,   30% CCx, 30% RCA stenoses). There are no concerning symptoms for angina currently. 6.      Fatigue: Multi-factorial possibly but admits to stress/anxiety which can contribute. She will d/w PCP about possible therapy +/- meds to help. Plan:  1. Continue current medications   2. We will add Vascepa 2 gm twice a day to help lower triglycerides   3. Discuss stress and anxiety with Dafne Joseph MD   4. Follow up with me in 6 months    5. Repeat fasting lipids in liver function test in 2 months   6. Restart Lipitor when able   7. Check Mg+ again (low at 1.4 in March)    Scribe's attestation: This note was scribed in the presence of Dr. Christi Cantu, By David Eli RN    I, Dr. Christi Cantu, personally performed the services described in this documentation, as scribed by the above signed scribe in my presence. It is both accurate and complete to my knowledge.  I agree with the details independently gathered by the clinical support staff, while the remaining scribed note accurately describes my personal service to the patient. Cost of prescription medications and patient compliance have been reviewed with patient. All questions answered. Thank you for allowing me to participate in the care of this individual.    Babar Srivastava M.D., [facility] [place]

## 2022-08-22 ENCOUNTER — HOSPITAL ENCOUNTER (OUTPATIENT)
Age: 82
Discharge: HOME | End: 2022-08-22
Payer: MEDICARE

## 2022-08-22 DIAGNOSIS — R79.0 LOW MAGNESIUM LEVEL: ICD-10-CM

## 2022-08-22 DIAGNOSIS — E78.2 MIXED HYPERLIPIDEMIA: ICD-10-CM

## 2022-08-22 LAB
ALBUMIN SERPL-MCNC: 4.5 G/DL (ref 3.4–5)
ALP BLD-CCNC: 54 U/L (ref 40–129)
ALT SERPL-CCNC: 20 U/L (ref 10–40)
AST SERPL-CCNC: 29 U/L (ref 15–37)
BILIRUB SERPL-MCNC: 0.4 MG/DL (ref 0–1)
BILIRUBIN DIRECT: <0.2 MG/DL (ref 0–0.3)
BILIRUBIN, INDIRECT: NORMAL MG/DL (ref 0–1)
CHOLESTEROL, TOTAL: 191 MG/DL (ref 0–199)
HDLC SERPL-MCNC: 27 MG/DL (ref 40–60)
LDL CHOLESTEROL CALCULATED: ABNORMAL MG/DL
LDL CHOLESTEROL DIRECT: 86 MG/DL
MAGNESIUM: 1.4 MG/DL (ref 1.8–2.4)
TOTAL PROTEIN: 7.1 G/DL (ref 6.4–8.2)
TRIGL SERPL-MCNC: 408 MG/DL (ref 0–150)
VLDLC SERPL CALC-MCNC: ABNORMAL MG/DL

## 2022-08-22 PROCEDURE — 80076 HEPATIC FUNCTION PANEL: CPT

## 2022-08-22 PROCEDURE — 83721 ASSAY OF BLOOD LIPOPROTEIN: CPT

## 2022-08-22 PROCEDURE — 36415 COLL VENOUS BLD VENIPUNCTURE: CPT

## 2022-08-22 PROCEDURE — 83735 ASSAY OF MAGNESIUM: CPT

## 2022-08-22 PROCEDURE — 80061 LIPID PANEL: CPT

## 2022-08-25 DIAGNOSIS — E78.2 MIXED HYPERLIPIDEMIA: ICD-10-CM

## 2022-08-25 DIAGNOSIS — E11.65 UNCONTROLLED TYPE 2 DIABETES MELLITUS WITH HYPERGLYCEMIA (HCC): ICD-10-CM

## 2022-08-25 DIAGNOSIS — Z79.899 MEDICATION MANAGEMENT: Primary | ICD-10-CM

## 2022-08-25 RX ORDER — ATORVASTATIN CALCIUM 80 MG/1
80 TABLET, FILM COATED ORAL NIGHTLY
Qty: 90 TABLET | Refills: 3 | Status: SHIPPED | OUTPATIENT
Start: 2022-08-25

## 2022-08-25 NOTE — TELEPHONE ENCOUNTER
----- Message from Manny Heaton MD sent at 8/24/2022  4:52 PM EDT -----  Please let Robe Rai know I reviewed her lab work. Her LDL is slightly elevated, triglycerides are significantly elevated. Liver function is normal.  Magnesium very low. Looks like Alexis Scripture was recently added for triglyceride management, lets make sure she started that. She does have coronary artery disease as well as carotid disease and recommendation will be to increase Lipitor to 80 mg nightly if tolerated. I would also recommend magnesium replacement at 400 mg once daily over-the-counter. Repeat magnesium level in 2 weeks. Repeat lipids 6 weeks.

## 2022-09-09 ENCOUNTER — HOSPITAL ENCOUNTER (OUTPATIENT)
Age: 82
Discharge: HOME | End: 2022-09-09
Payer: MEDICARE

## 2022-09-09 DIAGNOSIS — Z79.899 MEDICATION MANAGEMENT: ICD-10-CM

## 2022-09-09 LAB
CHOLESTEROL, TOTAL: 153 MG/DL (ref 0–199)
HDLC SERPL-MCNC: 26 MG/DL (ref 40–60)
LDL CHOLESTEROL CALCULATED: ABNORMAL MG/DL
LDL CHOLESTEROL DIRECT: 73 MG/DL
MAGNESIUM: 1.5 MG/DL (ref 1.8–2.4)
TRIGL SERPL-MCNC: 349 MG/DL (ref 0–150)
VLDLC SERPL CALC-MCNC: ABNORMAL MG/DL

## 2022-09-09 PROCEDURE — 83735 ASSAY OF MAGNESIUM: CPT

## 2022-09-09 PROCEDURE — 80061 LIPID PANEL: CPT

## 2022-09-09 PROCEDURE — 36415 COLL VENOUS BLD VENIPUNCTURE: CPT

## 2022-09-15 ENCOUNTER — TELEPHONE (OUTPATIENT)
Dept: CARDIOLOGY CLINIC | Age: 82
End: 2022-09-15

## 2022-09-15 RX ORDER — MAGNESIUM OXIDE 400 MG/1
400 TABLET ORAL DAILY
COMMUNITY

## 2022-09-15 NOTE — TELEPHONE ENCOUNTER
Called and spoke with patient. Relayed RKG results message.  She VU, she is agreeable to magnesium oxide 400 mg once daily, OTC.

## 2022-09-15 NOTE — TELEPHONE ENCOUNTER
----- Message from Maurizio Campos MD sent at 9/14/2022  8:41 PM EDT -----  Cholesterol level ok  Magnesium level is low  Recommend buy over the counter magnesium oxide and take 400 mg daily.

## 2022-09-27 NOTE — TELEPHONE ENCOUNTER
Refill Request     CONFIRM preferrred pharmacy with the patient. If Mail Order Rx - Pend for 90 day refill. Last Seen: Last Seen Department: 6/27/2022  Last Seen by PCP: 6/27/2022    Last Written: 04/11/22 1 refill    If no future appointment scheduled, route STAFF MESSAGE with patient name to the [facility] for scheduling. Next Appointment:   Future Appointments   Date Time Provider oJnes Berg   10/6/2022  8:30 AM MD TOBIAS Garcia  [place] [facility] [place]       Message sent to [employer] to schedule appt with patient?   NO      Requested Prescriptions     Pending Prescriptions Disp Refills    triamterene-hydroCHLOROthiazide (MAXZIDE-25) 37.5-25 MG per tablet [Pharmacy Med Name: TRIAMTERENE-HCTZ 37.5-25 MG TB] 90 tablet 1     Sig: TAKE ONE TABLET BY MOUTH DAILY

## 2022-09-28 RX ORDER — PAROXETINE HYDROCHLORIDE 20 MG/1
TABLET, FILM COATED ORAL
Qty: 90 TABLET | Refills: 1 | Status: SHIPPED | OUTPATIENT
Start: 2022-09-28

## 2022-09-28 RX ORDER — TRIAMTERENE AND HYDROCHLOROTHIAZIDE 37.5; 25 MG/1; MG/1
TABLET ORAL
Qty: 90 TABLET | Refills: 1 | Status: SHIPPED | OUTPATIENT
Start: 2022-09-28 | End: 2022-10-06

## 2022-10-06 ENCOUNTER — OFFICE VISIT (OUTPATIENT)
Dept: FAMILY MEDICINE CLINIC | Age: 82
End: 2022-10-06
Payer: MEDICARE

## 2022-10-06 VITALS
WEIGHT: 132.2 LBS | DIASTOLIC BLOOD PRESSURE: 70 MMHG | SYSTOLIC BLOOD PRESSURE: 122 MMHG | BODY MASS INDEX: 23.42 KG/M2 | OXYGEN SATURATION: 97 % | HEART RATE: 64 BPM

## 2022-10-06 DIAGNOSIS — M19.041 LOCALIZED OSTEOARTHRITIS OF HANDS, BILATERAL: ICD-10-CM

## 2022-10-06 DIAGNOSIS — E11.22 TYPE 2 DM WITH CKD STAGE 3 AND HYPERTENSION (HCC): ICD-10-CM

## 2022-10-06 DIAGNOSIS — N18.30 TYPE 2 DM WITH CKD STAGE 3 AND HYPERTENSION (HCC): ICD-10-CM

## 2022-10-06 DIAGNOSIS — Z79.899 CURRENT USE OF PROTON PUMP INHIBITOR: ICD-10-CM

## 2022-10-06 DIAGNOSIS — E11.65 UNCONTROLLED TYPE 2 DIABETES MELLITUS WITH HYPERGLYCEMIA (HCC): Primary | ICD-10-CM

## 2022-10-06 DIAGNOSIS — I12.9 TYPE 2 DM WITH CKD STAGE 3 AND HYPERTENSION (HCC): ICD-10-CM

## 2022-10-06 DIAGNOSIS — M19.042 LOCALIZED OSTEOARTHRITIS OF HANDS, BILATERAL: ICD-10-CM

## 2022-10-06 DIAGNOSIS — E83.42 HYPOMAGNESEMIA: ICD-10-CM

## 2022-10-06 PROBLEM — N18.9 ACUTE KIDNEY INJURY SUPERIMPOSED ON CKD (HCC): Status: RESOLVED | Noted: 2022-03-08 | Resolved: 2022-10-06

## 2022-10-06 PROBLEM — K56.41 FECAL IMPACTION IN RECTUM (HCC): Status: RESOLVED | Noted: 2022-03-08 | Resolved: 2022-10-06

## 2022-10-06 PROBLEM — K56.41 FECAL IMPACTION (HCC): Status: RESOLVED | Noted: 2022-03-09 | Resolved: 2022-10-06

## 2022-10-06 PROBLEM — N17.9 ACUTE KIDNEY INJURY SUPERIMPOSED ON CKD (HCC): Status: RESOLVED | Noted: 2022-03-08 | Resolved: 2022-10-06

## 2022-10-06 LAB — HBA1C MFR BLD: 7.9 %

## 2022-10-06 PROCEDURE — 3051F HG A1C>EQUAL 7.0%<8.0%: CPT | Performed by: FAMILY MEDICINE

## 2022-10-06 PROCEDURE — 99215 OFFICE O/P EST HI 40 MIN: CPT | Performed by: FAMILY MEDICINE

## 2022-10-06 PROCEDURE — 1123F ACP DISCUSS/DSCN MKR DOCD: CPT | Performed by: FAMILY MEDICINE

## 2022-10-06 PROCEDURE — 83036 HEMOGLOBIN GLYCOSYLATED A1C: CPT | Performed by: FAMILY MEDICINE

## 2022-10-06 RX ORDER — ALUMINUM HYDROXIDE AND MAGNESIUM CARBONATE 160; 105 MG/1; MG/1
TABLET, CHEWABLE ORAL
Refills: 0 | COMMUNITY
Start: 2022-10-06

## 2022-10-06 RX ORDER — AMLODIPINE BESYLATE 10 MG/1
10 TABLET ORAL DAILY
Qty: 30 TABLET | Refills: 2 | Status: SHIPPED | OUTPATIENT
Start: 2022-10-06 | End: 2022-11-05

## 2022-10-06 RX ORDER — LOSARTAN POTASSIUM 100 MG/1
TABLET ORAL
Qty: 30 TABLET | Refills: 2 | Status: SHIPPED | OUTPATIENT
Start: 2022-10-06

## 2022-10-06 ASSESSMENT — ANXIETY QUESTIONNAIRES
5. BEING SO RESTLESS THAT IT IS HARD TO SIT STILL: 0
4. TROUBLE RELAXING: 0
1. FEELING NERVOUS, ANXIOUS, OR ON EDGE: 0
IF YOU CHECKED OFF ANY PROBLEMS ON THIS QUESTIONNAIRE, HOW DIFFICULT HAVE THESE PROBLEMS MADE IT FOR YOU TO DO YOUR WORK, TAKE CARE OF THINGS AT HOME, OR GET ALONG WITH OTHER PEOPLE: NOT DIFFICULT AT ALL
2. NOT BEING ABLE TO STOP OR CONTROL WORRYING: 0
3. WORRYING TOO MUCH ABOUT DIFFERENT THINGS: 0
GAD7 TOTAL SCORE: 0
7. FEELING AFRAID AS IF SOMETHING AWFUL MIGHT HAPPEN: 0
6. BECOMING EASILY ANNOYED OR IRRITABLE: 0

## 2022-10-06 ASSESSMENT — PATIENT HEALTH QUESTIONNAIRE - PHQ9
SUM OF ALL RESPONSES TO PHQ QUESTIONS 1-9: 0
SUM OF ALL RESPONSES TO PHQ9 QUESTIONS 1 & 2: 0
SUM OF ALL RESPONSES TO PHQ QUESTIONS 1-9: 0
1. LITTLE INTEREST OR PLEASURE IN DOING THINGS: 0
SUM OF ALL RESPONSES TO PHQ QUESTIONS 1-9: 0
2. FEELING DOWN, DEPRESSED OR HOPELESS: 0
SUM OF ALL RESPONSES TO PHQ QUESTIONS 1-9: 0

## 2022-10-06 NOTE — PROGRESS NOTES
Rhona Laguna (:  1940) is a [de-identified] y.o. female,Established patient, here for evaluation of the following chief complaint(s):  Diabetes and Discuss Medications (Would like to stop taking so many medications )         ASSESSMENT/PLAN:  1. Uncontrolled type 2 diabetes mellitus with hyperglycemia (HCC)  -     POCT glycosylated hemoglobin (Hb A1C)  Counseling at today's visit: focused on the need for regular aerobic exercise and discussed the advantages of a diet low in carbohydrates. Offered to start medication, but she would like to try to modify her diet first.   Will recheck in 3 months. 2. Type 2 DM with CKD stage 3 and hypertension ([facility] [address]  -     Comprehensive Metabolic Panel; Future  Discussed getting off Maxzide and Prilosec for her renal health. Will increase losartan and amlodipine to help control BP. Recheck here in 2 weeks. 3. Localized osteoarthritis of hands, bilateral  -     diclofenac sodium (VOLTAREN) 1 % GEL; Apply 2 g topically 4 times daily, Topical, 4 TIMES DAILY Starting Thu 10/6/2022, OTC  -     External Referral To Orthopedic Surgery  4. Hypomagnesemia  -     Magnesium; Future  5. Current use of proton pump inhibitor  -     Vitamin B12; Future  -     Magnesium; Future  -     Comprehensive Metabolic Panel; Future  Stop Prilosec due to low magnesium and renal dysfunction. Trial of Gaviscon or Pepcid. Return in about 2 weeks (around 10/20/2022) for labs and BP recheck (lab visit), 3 months to see me for diabetes. Subjective   SUBJECTIVE/OBJECTIVE:  HPI Diabetes Mellitus Type 2: Current symptoms/problems include  fatigue and sweats. She feels she is taking too many medications and would like to talk about coming off of some . Medication compliance:  compliant all of the time  Diabetic diet compliance:   still eating sweats, really enjoys them ,    Weight trend: up 4 lbs since last visit  Eye exam current (within one year): yes   reports that she has never smoked.  She has never used smokeless tobacco.   Daily Aspirin? Yes  Known diabetic complications: nephropathy, retinopathy, cardiovascular disease, and peripheral vascular disease    Lab Results   Component Value Date    LABA1C 7.9 10/06/2022    LABA1C 7.7 06/27/2022    LABA1C 7.3 03/09/2022     Lab Results   Component Value Date    CREATININE 1.6 (H) 03/17/2022     Lab Results   Component Value Date    ALT 20 08/22/2022    AST 29 08/22/2022     No components found for: CHLPL  Lab Results   Component Value Date    TRIG 349 (H) 09/09/2022     Lab Results   Component Value Date    HDL 26 (L) 09/09/2022     Lab Results   Component Value Date/Time    LDLCALC see below 09/09/2022 09:57 AM    LDLDIRECT 73 09/09/2022 09:57 AM        She was recently found to have repeatedly low magnesium. She did not begin taking 800 mg of magnesium-ox per day because she was worried it was making her tired. She takes OTC Prilosec daily. She experiences heartburn. She has CKD. She continues to take Maxzide. She was previously taken off of it, but was restarted as her BP went out of control and she experienced swelling and wanted to to take it again. Review of Systems   Constitutional:  Positive for diaphoresis and fatigue. Musculoskeletal:  Positive for arthralgias (severe pain in fingers/hands from arthritis, requesting to see a hand specialist) and joint swelling. Psychiatric/Behavioral:  Positive for sleep disturbance. Objective   Physical Exam  Vitals and nursing note reviewed. Constitutional:       Appearance: Normal appearance. Pulmonary:      Effort: Pulmonary effort is normal.   Neurological:      Mental Status: She is alert.    Psychiatric:         Behavior: Behavior normal.     Results for POC orders placed in visit on 10/06/22   POCT glycosylated hemoglobin (Hb A1C)   Result Value Ref Range    Hemoglobin A1C 7.9 %       Lab Review   Hospital Outpatient Visit on 09/09/2022   Component Date Value    Cholesterol, Total 09/09/2022 153     Triglycerides 09/09/2022 349 (A)     HDL 09/09/2022 26 (A)     LDL Calculated 09/09/2022 see below     VLDL Cholesterol Calcula* 09/09/2022 see below     Magnesium 09/09/2022 1.50 (A)     LDL Direct 09/09/2022 73    Hospital Outpatient Visit on 08/22/2022   Component Date Value    Magnesium 08/22/2022 1.40 (A)     Cholesterol, Total 08/22/2022 191     Triglycerides 08/22/2022 408 (A)     HDL 08/22/2022 27 (A)     LDL Calculated 08/22/2022 see below     VLDL Cholesterol Calcula* 08/22/2022 see below     Total Protein 08/22/2022 7.1     Albumin 08/22/2022 4.5     Alkaline Phosphatase 08/22/2022 54     ALT 08/22/2022 20     AST 08/22/2022 29     Total Bilirubin 08/22/2022 0.4     Bilirubin, Direct 08/22/2022 <0.2     Bilirubin, Indirect 08/22/2022 see below     LDL Direct 08/22/2022 86      Lab Results   Component Value Date/Time     03/17/2022 02:16 PM    K 4.0 03/17/2022 02:16 PM    K 3.3 03/10/2022 05:54 AM     03/17/2022 02:16 PM    CO2 25 03/17/2022 02:16 PM    BUN 22 03/17/2022 02:16 PM    CREATININE 1.6 03/17/2022 02:16 PM    GLUCOSE 100 03/17/2022 02:16 PM    CALCIUM 10.2 03/17/2022 02:16 PM     Lab Results   Component Value Date/Time    CHOL 153 09/09/2022 09:57 AM    TRIG 349 09/09/2022 09:57 AM    HDL 26 09/09/2022 09:57 AM    LDLDIRECT 73 09/09/2022 09:57 AM            On this date 10/6/2022 I have spent 55 minutes reviewing previous notes, test results and face to face with the patient discussing the diagnosis and importance of compliance with the treatment plan as well as documenting on the day of the visit. An electronic signature was used to authenticate this note.     --Sandra Ugalde MD

## 2022-11-30 DIAGNOSIS — I10 ESSENTIAL HYPERTENSION: ICD-10-CM

## 2022-12-01 RX ORDER — METOPROLOL SUCCINATE 100 MG/1
TABLET, EXTENDED RELEASE ORAL
Qty: 90 TABLET | Refills: 1 | Status: SHIPPED | OUTPATIENT
Start: 2022-12-01

## 2023-01-09 ENCOUNTER — OFFICE VISIT (OUTPATIENT)
Dept: FAMILY MEDICINE CLINIC | Age: 83
End: 2023-01-09
Payer: MEDICARE

## 2023-01-09 VITALS
WEIGHT: 129 LBS | HEART RATE: 55 BPM | SYSTOLIC BLOOD PRESSURE: 124 MMHG | BODY MASS INDEX: 22.85 KG/M2 | OXYGEN SATURATION: 99 % | DIASTOLIC BLOOD PRESSURE: 80 MMHG

## 2023-01-09 DIAGNOSIS — M85.80 OSTEOPENIA, UNSPECIFIED LOCATION: ICD-10-CM

## 2023-01-09 DIAGNOSIS — N18.30 TYPE 2 DM WITH CKD STAGE 3 AND HYPERTENSION (HCC): ICD-10-CM

## 2023-01-09 DIAGNOSIS — I12.9 TYPE 2 DM WITH CKD STAGE 3 AND HYPERTENSION (HCC): ICD-10-CM

## 2023-01-09 DIAGNOSIS — G47.00 INSOMNIA, UNSPECIFIED TYPE: ICD-10-CM

## 2023-01-09 DIAGNOSIS — R53.82 CHRONIC FATIGUE: ICD-10-CM

## 2023-01-09 DIAGNOSIS — E11.22 TYPE 2 DM WITH CKD STAGE 3 AND HYPERTENSION (HCC): ICD-10-CM

## 2023-01-09 DIAGNOSIS — N18.32 STAGE 3B CHRONIC KIDNEY DISEASE (HCC): ICD-10-CM

## 2023-01-09 DIAGNOSIS — N90.4 LICHEN SCLEROSUS OF FEMALE GENITALIA: ICD-10-CM

## 2023-01-09 DIAGNOSIS — E78.5 DM TYPE 2 WITH DIABETIC DYSLIPIDEMIA (HCC): ICD-10-CM

## 2023-01-09 DIAGNOSIS — E11.69 DM TYPE 2 WITH DIABETIC DYSLIPIDEMIA (HCC): ICD-10-CM

## 2023-01-09 DIAGNOSIS — E83.42 HYPOMAGNESEMIA: ICD-10-CM

## 2023-01-09 DIAGNOSIS — E11.65 UNCONTROLLED TYPE 2 DIABETES MELLITUS WITH HYPERGLYCEMIA (HCC): Primary | ICD-10-CM

## 2023-01-09 LAB — HBA1C MFR BLD: 8.2 %

## 2023-01-09 PROCEDURE — 99215 OFFICE O/P EST HI 40 MIN: CPT | Performed by: FAMILY MEDICINE

## 2023-01-09 PROCEDURE — 83036 HEMOGLOBIN GLYCOSYLATED A1C: CPT | Performed by: FAMILY MEDICINE

## 2023-01-09 PROCEDURE — 1123F ACP DISCUSS/DSCN MKR DOCD: CPT | Performed by: FAMILY MEDICINE

## 2023-01-09 PROCEDURE — 3052F HG A1C>EQUAL 8.0%<EQUAL 9.0%: CPT | Performed by: FAMILY MEDICINE

## 2023-01-09 PROCEDURE — 3078F DIAST BP <80 MM HG: CPT | Performed by: FAMILY MEDICINE

## 2023-01-09 PROCEDURE — 3074F SYST BP LT 130 MM HG: CPT | Performed by: FAMILY MEDICINE

## 2023-01-09 RX ORDER — TRAZODONE HYDROCHLORIDE 50 MG/1
50 TABLET ORAL NIGHTLY PRN
Qty: 30 TABLET | Refills: 2 | Status: SHIPPED | OUTPATIENT
Start: 2023-01-09

## 2023-01-09 RX ORDER — CLOBETASOL PROPIONATE 0.5 MG/G
CREAM TOPICAL
Qty: 30 G | Refills: 2 | Status: SHIPPED | OUTPATIENT
Start: 2023-01-09

## 2023-01-09 ASSESSMENT — PATIENT HEALTH QUESTIONNAIRE - PHQ9
10. IF YOU CHECKED OFF ANY PROBLEMS, HOW DIFFICULT HAVE THESE PROBLEMS MADE IT FOR YOU TO DO YOUR WORK, TAKE CARE OF THINGS AT HOME, OR GET ALONG WITH OTHER PEOPLE: 1
1. LITTLE INTEREST OR PLEASURE IN DOING THINGS: 1
7. TROUBLE CONCENTRATING ON THINGS, SUCH AS READING THE NEWSPAPER OR WATCHING TELEVISION: 0
5. POOR APPETITE OR OVEREATING: 1
SUM OF ALL RESPONSES TO PHQ QUESTIONS 1-9: 7
3. TROUBLE FALLING OR STAYING ASLEEP: 1
9. THOUGHTS THAT YOU WOULD BE BETTER OFF DEAD, OR OF HURTING YOURSELF: 0
2. FEELING DOWN, DEPRESSED OR HOPELESS: 1
SUM OF ALL RESPONSES TO PHQ QUESTIONS 1-9: 7
8. MOVING OR SPEAKING SO SLOWLY THAT OTHER PEOPLE COULD HAVE NOTICED. OR THE OPPOSITE, BEING SO FIGETY OR RESTLESS THAT YOU HAVE BEEN MOVING AROUND A LOT MORE THAN USUAL: 0
6. FEELING BAD ABOUT YOURSELF - OR THAT YOU ARE A FAILURE OR HAVE LET YOURSELF OR YOUR FAMILY DOWN: 1
SUM OF ALL RESPONSES TO PHQ QUESTIONS 1-9: 7
4. FEELING TIRED OR HAVING LITTLE ENERGY: 2
SUM OF ALL RESPONSES TO PHQ9 QUESTIONS 1 & 2: 2
SUM OF ALL RESPONSES TO PHQ QUESTIONS 1-9: 7

## 2023-01-09 ASSESSMENT — ANXIETY QUESTIONNAIRES
3. WORRYING TOO MUCH ABOUT DIFFERENT THINGS: 0
7. FEELING AFRAID AS IF SOMETHING AWFUL MIGHT HAPPEN: 0
6. BECOMING EASILY ANNOYED OR IRRITABLE: 0
4. TROUBLE RELAXING: 0
GAD7 TOTAL SCORE: 0
1. FEELING NERVOUS, ANXIOUS, OR ON EDGE: 0
5. BEING SO RESTLESS THAT IT IS HARD TO SIT STILL: 0
IF YOU CHECKED OFF ANY PROBLEMS ON THIS QUESTIONNAIRE, HOW DIFFICULT HAVE THESE PROBLEMS MADE IT FOR YOU TO DO YOUR WORK, TAKE CARE OF THINGS AT HOME, OR GET ALONG WITH OTHER PEOPLE: NOT DIFFICULT AT ALL
2. NOT BEING ABLE TO STOP OR CONTROL WORRYING: 0

## 2023-01-10 PROBLEM — N90.4 LICHEN SCLEROSUS OF FEMALE GENITALIA: Status: ACTIVE | Noted: 2023-01-10

## 2023-01-10 NOTE — PROGRESS NOTES
Jordy Saleem (:  1940) is a [de-identified] y.o. female,Established patient, here for evaluation of the following chief complaint(s):  Diabetes         ASSESSMENT/PLAN:  1. Uncontrolled type 2 diabetes mellitus with hyperglycemia (HCC) - uncontrolled, worsening  -     POCT glycosylated hemoglobin (Hb A1C)  -     SITagliptin (JANUVIA) 50 MG tablet; Take 1 tablet by mouth daily, Disp-30 tablet, R-5Normal - restart Januvia, renally dosed. Recheck in 3 months. -     Lipid Panel; Future  2. Type 2 DM with CKD stage 3 and hypertension (HCC)  -     POCT glycosylated hemoglobin (Hb A1C)  -     CBC with Auto Differential; Future  -     Magnesium; Future  -     Comprehensive Metabolic Panel; Future  -     Vitamin D 25 Hydroxy; Future  Discussed risks of CKD, possible progression with plan, and the importance of avoiding drugs that nephrotoxic to hopefully prevent worsening in the near future. 3. DM type 2 with diabetic dyslipidemia (HCC)  -     POCT glycosylated hemoglobin (Hb A1C)  -     Lipid Panel; Future  4. Stage 3b chronic kidney disease ([facility] [address]  -     Comprehensive Metabolic Panel; Future  5. Hypomagnesemia  -     Magnesium; Future  -     Comprehensive Metabolic Panel; Future  6. Chronic fatigue  -     CBC with Auto Differential; Future  -     Folate; Future  -     Vitamin B12; Future  May be from hypomagnesemia. Will also check above labs. Discussed poor sleep patterns and moods can also cause fatigue. She declines change in Paxil today, would like to focus on sleep first.   7. Insomnia, unspecified type  -     traZODone (DESYREL) 50 MG tablet; Take 1 tablet by mouth nightly as needed for Sleep, Disp-30 tablet, R-2Normal  Discussed sleep hygiene measures including regular sleep schedule, optimal sleep environment, and relaxing presleep rituals. Avoid daytime naps. Avoid caffeine after noon. Avoid excess alcohol. Recommended daily exercise. Educational materials distributed.   Discussed meeting with Dr. Jostin Hills for CBT-I in the future should she be open to doing so. Will do trial of trazodone at night to see if that helps. 8. Osteopenia, unspecified location  -     Vitamin D 25 Hydroxy; Future  9. Lichen sclerosus of female genitalia  -     clobetasol (TEMOVATE) 0.05 % cream; Apply topically 2 times daily. , Disp-30 g, R-2, Normal  Call or return to clinic prn if these symptoms worsen or fail to improve as anticipated. Return in about 3 months (around 4/9/2023). Subjective   SUBJECTIVE/OBJECTIVE:  Chief Complaint   Patient presents with    Diabetes       HPI Michelle Murphy is a [de-identified] y.o. female with type 2 diabetes with associated HTN, HLP, and stage 3b CKD whom is here today for routine diabetes follow up. She continues to have ongoing struggles with chronic fatigue. She states she does not feel like herself. This has been going on since her 82th birthday 10 months ago when she had fecal impaction from severe constipation. She has had hypomagnesemia since that time as well. She was put on a magnesium supplement. Michelle Murphy [de-identified] y.o.  presents with the complaint of insomnia. She complains of difficulty falling asleep and daytime somnolence, depression, and fatigue. she has tried over the counter diphenhydramine and going to sleep at the same time each night. She has also tried melatonin. Patient has found no relief with any of those treatments. .     Vaginitis: Patient complains of vaginal burning for several days. Vaginal symptoms include local irritation, vulvar itching, burning, and pain. STI Risk: Very low risk of STD exposure. Other associated symptoms: none. Menstrual pattern: Postmenopausal.        Review of Systems - as noted in HPI       Objective   Vitals:    01/09/23 1122   BP: 124/80   Site: Left Upper Arm   Position: Sitting   Cuff Size: Small Adult   Pulse: 55   SpO2: 99%   Weight: 129 lb (58.5 kg)      Physical Exam  Vitals and nursing note reviewed.    Constitutional:       Appearance: Normal appearance. Cardiovascular:      Rate and Rhythm: Normal rate and regular rhythm. Pulmonary:      Effort: Pulmonary effort is normal.      Breath sounds: Normal breath sounds. Genitourinary:     Comments: Large white patch with mild surround erythema at the introitus on the right. No discharge noted. Neurological:      Mental Status: She is alert. Psychiatric:         Mood and Affect: Mood is depressed. Affect is tearful. Behavior: Behavior normal. Behavior is cooperative. Results for POC orders placed in visit on 01/09/23   POCT glycosylated hemoglobin (Hb A1C)   Result Value Ref Range    Hemoglobin A1C 8.2 %       On this date 1/9/2023 I have spent 45 minutes reviewing previous notes, test results and face to face with the patient discussing the diagnosis and treatment plan, as well as documenting on the day of the visit. An electronic signature was used to authenticate this note.     --Desirae Sanchez MD

## 2023-01-20 RX ORDER — AMLODIPINE BESYLATE 10 MG/1
TABLET ORAL
Qty: 60 TABLET | Refills: 0 | Status: SHIPPED | OUTPATIENT
Start: 2023-01-20

## 2023-01-20 NOTE — TELEPHONE ENCOUNTER
Refill Request     CONFIRM preferrred pharmacy with the patient. If Mail Order Rx - Pend for 90 day refill. Last Seen: Last Seen Department: 1/9/2023  Last Seen by PCP: 1/9/2023    Last Written: 10/6/2022 30 with 2     If no future appointment scheduled, route STAFF MESSAGE with patient name to the [facility] for scheduling. Next Appointment:   Future Appointments   Date Time Provider Jones Berg   2/14/2023  3:30 PM MD Lakhwinder Olivier [facility]   4/13/2023 11:00 AM Robert Gonsalez MD [facility] [place] [facility]       Message sent to [address] [place] [address] to schedule appt with patient?   NO      Requested Prescriptions     Pending Prescriptions Disp Refills    amLODIPine (NORVASC) 10 MG tablet [Pharmacy Med Name: amLODIPine BESYLATE 10 MG TAB] 60 tablet      Sig: TAKE ONE TABLET BY MOUTH DAILY

## 2023-01-24 NOTE — TELEPHONE ENCOUNTER
Refill Request     CONFIRM preferrred pharmacy with the patient. If Mail Order Rx - Pend for 90 day refill. Last Seen: Last Seen Department: 1/9/2023  Last Seen by PCP: 1/9/2023    Last Written: 10/06/2022, 330, 2 refills    If no future appointment scheduled, route STAFF MESSAGE with patient name to the [facility] for scheduling. Next Appointment:   Future Appointments   Date Time Provider Jones Berg   2/14/2023  3:30 PM Glenwood Laurel, MD Feliberto Moritz [facility]   4/13/2023 11:00 AM Emil Ontiveros MD [facility] [place] [facility]       Message sent to [address] [place] [address] to schedule appt with patient?   NO      Requested Prescriptions     Pending Prescriptions Disp Refills    losartan (COZAAR) 100 MG tablet [Pharmacy Med Name: LOSARTAN POTASSIUM 100 MG TAB] 60 tablet      Sig: TAKE ONE TABLET BY MOUTH DAILY

## 2023-01-26 RX ORDER — LOSARTAN POTASSIUM 100 MG/1
TABLET ORAL
Qty: 60 TABLET | Refills: 0 | Status: SHIPPED | OUTPATIENT
Start: 2023-01-26

## 2023-02-14 ENCOUNTER — OFFICE VISIT (OUTPATIENT)
Dept: CARDIOLOGY CLINIC | Age: 83
End: 2023-02-14
Payer: MEDICARE

## 2023-02-14 VITALS
SYSTOLIC BLOOD PRESSURE: 146 MMHG | HEART RATE: 60 BPM | DIASTOLIC BLOOD PRESSURE: 72 MMHG | OXYGEN SATURATION: 94 % | HEIGHT: 63 IN | BODY MASS INDEX: 23.57 KG/M2 | WEIGHT: 133 LBS

## 2023-02-14 DIAGNOSIS — I65.22 LEFT CAROTID ARTERY STENOSIS: Primary | ICD-10-CM

## 2023-02-14 DIAGNOSIS — I25.10 CORONARY ARTERY DISEASE INVOLVING NATIVE CORONARY ARTERY OF NATIVE HEART WITHOUT ANGINA PECTORIS: ICD-10-CM

## 2023-02-14 PROCEDURE — 3077F SYST BP >= 140 MM HG: CPT | Performed by: INTERNAL MEDICINE

## 2023-02-14 PROCEDURE — 1123F ACP DISCUSS/DSCN MKR DOCD: CPT | Performed by: INTERNAL MEDICINE

## 2023-02-14 PROCEDURE — 99214 OFFICE O/P EST MOD 30 MIN: CPT | Performed by: INTERNAL MEDICINE

## 2023-02-14 PROCEDURE — 3078F DIAST BP <80 MM HG: CPT | Performed by: INTERNAL MEDICINE

## 2023-02-14 RX ORDER — OMEPRAZOLE 10 MG/1
10 CAPSULE, DELAYED RELEASE ORAL DAILY
COMMUNITY

## 2023-02-14 NOTE — PATIENT INSTRUCTIONS
Plan:  Current medications reviewed. No changes at this time. Refills given as warranted. I recommend you taking over counter Fish oil. Speak with pharmacy for dosage. Labs from January 2023 reviewed. Labs are due prior to next office visit. TSH, CBC. CMP and fasting lipids at any [facility]. Continue monitoring BP at home. Goal /80. Repeat Carotid Dopplers to assess left carotid artery. Call [phone] to schedule this test  Continue to encourage heart healthy, low sodium diet and regular physical exercise for at least 30 minutes a minimum of 3-5 times per week. This will help lower cholesterol. Follow up with me in 6 months.

## 2023-02-14 NOTE — PROGRESS NOTES
[facility]   Cardiac Follow UP    Referring Provider: Juanita Gaines CNP. Chief Complaint   Patient presents with    6 Month Follow-Up    Coronary Artery Disease    Hypertension    Hyperlipidemia    Edema     Initially saw as new patient at the request of Dr. Reg Connors for fatigue, CHAMORRO, and abnormal EKG in 11/21. SUBJECTIVE: Ms. Zahida Ceballos has a PMH of CAD, HTN, HLD, DM II, CKD, GERD; History of Present Illness:   Arlean Saint is a [de-identified] y.o. female who has [address] [facility] [facility] dx 12/21, HTN, HLD, borderline DM, Left carotid disease (50-69%), and GERD. GXT myoview 8/26/2010 negative for ischemia. ECHO 12/8/2021 EF=62%-  Frequent PVC's; Due to ongoing CHAMORRO and concern for anginal equivalent, multiple CAD RF's, and abnormal T inversion on EKG underwent  LHC with Dr. Che Barbosa 12/8/2021 showing NOCAD (20% and 40-50% LAD, 30% CCx, 30% RCA stenoses). Carotid dopplers 2/9/2022, <50% stenosis right artery, 50-69% blockage left (no change 10/21). EKG 12/8/2021, SR, occasional PVC's, otherwise normal. She has underwent a sleep study and chamorro not have sleep apnea. Her Mg+ was 1.40 3/10/2022 and same on 1/9/23. Today 2/14/23, patient presents with her . She reports feeling fine from cardiac standpoint. She reports having low energy levels. BP monitors BP at home with average readings of 140/80s. Patient denies current edema, chest pain, sob, palpitations, dizziness or syncope. Patient is taking all cardiac medications as prescribed and tolerates them well. She is upset about recent good friend's passing.      Weight today 133# (up 5# from 8/22)    Past Medical History:   has a past medical history of Acute kidney injury superimposed on CKD ([address] Anal fissure, Back pain, Carotid stenosis, Chronic insomnia, Chronic kidney disease, stage III (moderate) ([address] Depression, Diabetic retinopathy of both eyes ([address] DM type 2 with diabetic dyslipidemia ([facility] [address] Fecal impaction ([facility] [address] Fecal impaction in rectum [facility]), GERD (gastroesophageal reflux disease), Glossitis, Hearing loss, Hypercholesteremia, Hypertension, Lumbar radiculopathy, Osteopenia, Patellofemoral stress syndrome of right knee, and Uncontrolled type 2 diabetes with retinopathy. Surgical History:   has a past surgical history that includes Cholecystectomy; Appendectomy; knee surgery (left knee); Colonoscopy (1/18/2016); Upper gastrointestinal endoscopy (1/18/2016); Cholecystectomy; Hysterectomy, total abdominal; and Ovary removal.     Social History:   reports that she has never smoked. She has never used smokeless tobacco. She reports current alcohol use. She reports that she does not use drugs. Family History:  family history includes Heart Disease in her brother; High Blood Pressure in an other family member; Stroke in her father. Home Medications:  Prior to Admission medications    Medication Sig Start Date End Date Taking? Authorizing Provider   omeprazole (PRILOSEC) 10 MG delayed release capsule Take 10 mg by mouth daily   Yes Historical Provider, MD   losartan (COZAAR) 100 MG tablet TAKE ONE TABLET BY MOUTH DAILY 1/26/23  Yes Stevan Hernandez DO   amLODIPine (NORVASC) 10 MG tablet TAKE ONE TABLET BY MOUTH DAILY 1/20/23  Yes MARTINE Ramesh CNP   SITagliptin (JANUVIA) 50 MG tablet Take 1 tablet by mouth daily 1/9/23  Yes Reno Simpson MD   clobetasol (TEMOVATE) 0.05 % cream Apply topically 2 times daily.  1/9/23  Yes Reno Simpson MD   metoprolol succinate (TOPROL XL) 100 MG extended release tablet TAKE ONE TABLET BY MOUTH ONCE NIGHTLY 12/1/22  Yes Huy Nunez MD   diclofenac sodium (VOLTAREN) 1 % GEL Apply 2 g topically 4 times daily 10/6/22  Yes Reno Simpson MD   PARoxetine (PAXIL) 20 MG tablet TAKE ONE TABLET BY MOUTH DAILY 9/28/22  Yes Reno Simpson MD   magnesium oxide (MAG-OX) 400 MG tablet Take 500 mg by mouth daily   Yes Historical Provider, MD   atorvastatin (LIPITOR) 80 MG tablet Take 1 tablet by mouth at bedtime TAKE ONE TABLET BY MOUTH DAILY 8/25/22  Yes Miguel A Miller MD   fenofibrate (TRIGLIDE) 160 MG tablet TAKE ONE TABLET BY MOUTH DAILY 2/1/22  Yes Charo Walters MD   Multiple Vitamins-Minerals (THERAPEUTIC MULTIVITAMIN-MINERALS) tablet Take 1 tablet by mouth daily   Yes Historical Provider, MD   Probiotic Product (PROBIOTIC DAILY PO) Take by mouth   Yes Historical Provider, MD   aspirin 81 MG EC tablet Take 81 mg by mouth every evening. 12/31/10  Yes Historical Provider, MD   traZODone (DESYREL) 50 MG tablet Take 1 tablet by mouth nightly as needed for Sleep 1/9/23   Sonya James MD        Allergies:  Adhesive tape, Codeine, Iodides, Sulfa antibiotics, and Penicillins     Review of Systems:   Constitutional: there has been no unanticipated weight loss. There's been no change in energy level, sleep pattern, or activity level. Eyes: No visual changes or diplopia. No scleral icterus. ENT: No Headaches, hearing loss or vertigo. No mouth sores or sore throat. Cardiovascular: Reviewed in HPI  Respiratory: No cough or wheezing, no sputum production. No hematemesis. Gastrointestinal: No abdominal pain, appetite loss, blood in stools. No change in bowel or bladder habits. Genitourinary: No dysuria, trouble voiding, or hematuria. Musculoskeletal:  No gait disturbance, weakness or joint complaints. Integumentary: No rash or pruritis. Neurological: No headache, diplopia, change in muscle strength, numbness or tingling. No change in gait, balance, coordination, mood, affect, memory, mentation, behavior. Psychiatric: No anxiety, no depression. Endocrine: No malaise, fatigue or temperature intolerance. No excessive thirst, fluid intake, or urination. No tremor. Hematologic/Lymphatic: No abnormal bruising or bleeding, blood clots or swollen lymph nodes. Allergic/Immunologic: No nasal congestion or hives.     Physical Examination:    BP (!) 146/72   Pulse 60   Ht 5' 3\" (1.6 m)   Wt 133 lb (60.3 kg)   SpO2 94%   BMI 23.56 kg/m²     Constitutional and General Appearance: NAD   Respiratory:  Normal excursion and expansion without use of accessory muscles  Resp Auscultation: Normal breath sounds without dullness. Clear   Cardiovascular: The apical impulses not displaced  Heart tones are crisp and normal, normal rhythm with occasional ectopy   Cervical veins are not engorged  +left carotid bruit   Normal S1S2, No S3, +soft SHELLI   Peripheral pulses are symmetrical and full  There is no clubbing, cyanosis of the extremities. Trace RLE. No edema LLE. Femoral Arteries: 2+ and equal  Pedal Pulses: 2+ and equal   Abdomen:  No masses or tenderness  Liver/Spleen: No Abnormalities Noted  Neurological/Psychiatric:  Alert and oriented in all spheres  Moves all extremities well  Exhibits normal gait balance and coordination  No abnormalities of mood, affect, memory, mentation, or behavior are noted  Skin:  Skin: warm and dry.     Lab Results   Component Value Date    CHOL 169 01/09/2023    CHOL 153 09/09/2022    CHOL 191 08/22/2022     Lab Results   Component Value Date    TRIG 367 (H) 01/09/2023    TRIG 349 (H) 09/09/2022    TRIG 408 (H) 08/22/2022     Lab Results   Component Value Date    HDL 29 (L) 01/09/2023    HDL 26 (L) 09/09/2022    HDL 27 (L) 08/22/2022     Lab Results   Component Value Date    LDLCALC see below 01/09/2023    [address] see below 09/09/2022    LDLCALC see below 08/22/2022     Lab Results   Component Value Date    LABVLDL see below 01/09/2023    LABVLDL see below 09/09/2022    LABVLDL see below 08/22/2022     Lab Results   Component Value Date/Time     01/09/2023 12:35 PM    K 4.4 01/09/2023 12:35 PM    K 3.3 03/10/2022 05:54 AM     01/09/2023 12:35 PM    CO2 28 01/09/2023 12:35 PM    BUN 35 01/09/2023 12:35 PM    CREATININE 1.3 01/09/2023 12:35 PM    GLUCOSE 177 01/09/2023 12:35 PM    CALCIUM 10.5 01/09/2023 12:35 PM      Lab Results   Component Value Date    ALT 17 01/09/2023    AST 26 01/09/2023    ALKPHOS 65 01/09/2023    BILITOT 0.4 01/09/2023     Lab Results   Component Value Date    WBC 5.2 01/09/2023    HGB 13.4 01/09/2023    HCT 39.8 01/09/2023    MCV 88.7 01/09/2023     01/09/2023     Labs- 3/17/2022 NA+ 140, K+ 4.0, BUN 25, Creatinine 1.6, mag 1.4, , HDL 31, LD 49, , ALT 23, AST 28, TSH .76, H/H       Assessment:     1. Mixed hyperlipidemia: I personally reviewed most recent lipids from 1/9/23 in Epic (see above). Note chronic low HLD and higher TG. LDL 81 and not quite at goal. I offered Zetia but she does not want to take anymore meds and will diet better to try and lower. Continue lipitor 40mg qd and fenofibrate 160mg daily. Could not afford vascepa and will now start OTC fish oil 2 tablets BID. 2. Primary hypertension: SBP mildly elevated but she does not want to take anymore medication. She is at maximum dose on current anti-HTN meds. Ideal goal /80 but she will settle for 140/90 or less. If SBP higher she is to call and I would add aldactone 25mg daily at that time. 3. Stenosis of left carotid artery: Most recent carotid dopplers 2/9/2022, <50% stenosis right artery, 50-69% blockage left (no change 10/21). Follow clinically and repeat study near future. 4.      Abnormal EKG:  ECG 10/20/2021 SB, ST abnormality c/w possible anterolateral Ischemia, 56bpm. Repeat EKG 12/21 NSR, PVC's; NST changes (prior T inversion resolved). 5.      Coronary artery disease--C with Dr. Farhad Huang on 12/8/2021 which showed NOCAD (20% and 40-50% LAD,   30% CCx, 30% RCA stenoses). There are no concerning symptoms for angina currently. 6.      Fatigue: Multi-factorial likely and having stress over recent friend's death which can contribute. She can d/w PCP about possible therapy +/- meds to help. Plan:  Current medications reviewed. No changes at this time. Refills given as warranted. I recommend you taking over counter Fish oil. Speak with pharmacy for dosage.    Labs from January 2023 reviewed. Labs are due prior to next office visit. TSH, CBC. CMP and fasting lipids at any [facility]. Continue monitoring BP at home. Goal /80. Repeat Carotid Dopplers to assess left carotid artery. Call [phone] to schedule this test  Continue to encourage heart healthy, low sodium diet and regular physical exercise for at least 30 minutes a minimum of 3-5 times per week. This will help lower cholesterol. Follow up with me in 6 months. This note has been scribed in the presence of Beena Darnell MD, by Vincent Espinal RN.     I, Dr. Beena Darnell, personally performed the services described in this documentation, as scribed by the above signed scribe in my presence. It is both accurate and complete to my knowledge. I agree with the details independently gathered by the clinical support staff, while the remaining scribed note accurately describes my personal service to the patient. Cost of prescription medications and patient compliance have been reviewed with patient. All questions answered. Thank you for allowing me to participate in the care of this individual.    [place].  Emerita Goodrich M.D., [facility]

## 2023-03-14 ENCOUNTER — HOSPITAL ENCOUNTER (OUTPATIENT)
Dept: VASCULAR LAB | Age: 83
Discharge: HOME | End: 2023-03-14
Payer: MEDICARE

## 2023-03-14 ENCOUNTER — TELEPHONE (OUTPATIENT)
Dept: CARDIOLOGY CLINIC | Age: 83
End: 2023-03-14

## 2023-03-14 DIAGNOSIS — E78.2 MIXED HYPERLIPIDEMIA: ICD-10-CM

## 2023-03-14 DIAGNOSIS — I25.10 CORONARY ARTERY DISEASE INVOLVING NATIVE CORONARY ARTERY OF NATIVE HEART WITHOUT ANGINA PECTORIS: ICD-10-CM

## 2023-03-14 DIAGNOSIS — I10 PRIMARY HYPERTENSION: ICD-10-CM

## 2023-03-14 DIAGNOSIS — I65.22 LEFT CAROTID ARTERY STENOSIS: ICD-10-CM

## 2023-03-14 DIAGNOSIS — I65.23 BILATERAL CAROTID ARTERY STENOSIS: Primary | ICD-10-CM

## 2023-03-14 PROCEDURE — 93880 EXTRACRANIAL BILAT STUDY: CPT

## 2023-03-14 NOTE — TELEPHONE ENCOUNTER
----- Message from Lawrence Finnegan MD sent at 3/14/2023  1:13 PM EDT -----  Bilateral 50-69% stenoses. Prior right < 50% but has progressed since 2/22 study. Left stable same stenosis. No need for concern. Nothing to do until blockage 80% or greater. We will follow closely. Repeat carotid study in 6 months. Continue aspirin and lipitor daily.

## 2023-03-14 NOTE — TELEPHONE ENCOUNTER
Created telephone encounter. Spoke with Cesar Traylor relayed message per [facility] regarding carotid US. Pt verbalized understanding. Order placed in epic to be done in 6 months per SMM.

## 2023-03-17 DIAGNOSIS — E11.65 UNCONTROLLED TYPE 2 DIABETES MELLITUS WITH HYPERGLYCEMIA (HCC): ICD-10-CM

## 2023-03-20 RX ORDER — AMLODIPINE BESYLATE 10 MG/1
TABLET ORAL
Qty: 90 TABLET | Refills: 3 | Status: SHIPPED | OUTPATIENT
Start: 2023-03-20

## 2023-03-20 NOTE — TELEPHONE ENCOUNTER
Refill Request     CONFIRM preferred pharmacy with the patient. If Mail Order Rx - Pend for 90 day refill. Last Seen: Last Seen Department: 1/9/2023  Last Seen by PCP: 1/9/2023    Last Written: 1/20/2023 60 with 0     If no future appointment scheduled, route STAFF MESSAGE with patient name to the [facility] for scheduling. Next Appointment:   Future Appointments   Date Time Provider Jones Berg   4/13/2023 11:00 AM Paul Johnson MD [facility] [place] - DYD   8/15/2023  3:30 PM MD Adán Alonzo MMA       Message sent to [address] [place] [address] to schedule appt with patient?   NO      Requested Prescriptions     Pending Prescriptions Disp Refills    amLODIPine (NORVASC) 10 MG tablet 60 tablet 0     Sig: TAKE ONE TABLET BY MOUTH DAILY

## 2023-03-22 NOTE — TELEPHONE ENCOUNTER
Refill Request     CONFIRM preferred pharmacy with the patient. If Mail Order Rx - Pend for 90 day refill. Last Seen: Last Seen Department: 1/9/2023  Last Seen by PCP: 1/9/2023    Last Written: 90 with 3 9/28/2022     If no future appointment scheduled, route STAFF MESSAGE with patient name to the [facility] for scheduling. Next Appointment:   Future Appointments   Date Time Provider Jones Berg   4/13/2023 11:00 AM Paul Johnson MD [facility] [place] - DYD   8/15/2023  3:30 PM MD Adán Alonzo MMA       Message sent to [address] [place] [address] to schedule appt with patient?   NO      Requested Prescriptions     Pending Prescriptions Disp Refills    PARoxetine (PAXIL) 20 MG tablet 90 tablet 1     Sig: Take 1 tablet by mouth daily    losartan (COZAAR) 100 MG tablet 60 tablet 0     Sig: Take 1 tablet by mouth daily

## 2023-03-23 ENCOUNTER — APPOINTMENT (OUTPATIENT)
Dept: CT IMAGING | Age: 83
End: 2023-03-23
Payer: MEDICARE

## 2023-03-23 ENCOUNTER — HOSPITAL ENCOUNTER (EMERGENCY)
Age: 83
Discharge: HOME | End: 2023-03-23
Attending: EMERGENCY MEDICINE
Payer: MEDICARE

## 2023-03-23 ENCOUNTER — APPOINTMENT (OUTPATIENT)
Dept: GENERAL RADIOLOGY | Age: 83
End: 2023-03-23
Payer: MEDICARE

## 2023-03-23 VITALS
TEMPERATURE: 98.4 F | OXYGEN SATURATION: 96 % | RESPIRATION RATE: 20 BRPM | HEIGHT: 63 IN | BODY MASS INDEX: 23.04 KG/M2 | HEART RATE: 56 BPM | SYSTOLIC BLOOD PRESSURE: 121 MMHG | DIASTOLIC BLOOD PRESSURE: 47 MMHG | WEIGHT: 130 LBS

## 2023-03-23 DIAGNOSIS — E04.1 THYROID NODULE: ICD-10-CM

## 2023-03-23 DIAGNOSIS — S30.0XXA TRAUMATIC HEMATOMA OF BUTTOCK, INITIAL ENCOUNTER: ICD-10-CM
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
## 2023-03-23 LAB
ALBUMIN SERPL-MCNC: 3.7 G/DL (ref 3.4–5)
ALBUMIN/GLOB SERPL: 1.8 {RATIO} (ref 1.1–2.2)
ALP SERPL-CCNC: 47 U/L (ref 40–129)
ALT SERPL-CCNC: 14 U/L (ref 10–40)
ANION GAP SERPL CALCULATED.3IONS-SCNC: 7 MMOL/L (ref 3–16)
APTT BLD: 27.4 SEC (ref 23–34.3)
AST SERPL-CCNC: 26 U/L (ref 15–37)
BASOPHILS # BLD: 0 K/UL (ref 0–0.2)
BASOPHILS NFR BLD: 0.5 %
BILIRUB SERPL-MCNC: 0.3 MG/DL (ref 0–1)
BUN SERPL-MCNC: 28 MG/DL (ref 7–20)
CALCIUM SERPL-MCNC: 9.6 MG/DL (ref 8.3–10.6)
CHLORIDE SERPL-SCNC: 102 MMOL/L (ref 99–110)
CO2 SERPL-SCNC: 28 MMOL/L (ref 21–32)
CREAT SERPL-MCNC: 1.1 MG/DL (ref 0.6–1.2)
DEPRECATED RDW RBC AUTO: 14.2 % (ref 12.4–15.4)
EOSINOPHIL # BLD: 0.2 K/UL (ref 0–0.6)
EOSINOPHIL NFR BLD: 1.8 %
GFR SERPLBLD CREATININE-BSD FMLA CKD-EPI: 50 ML/MIN/{1.73_M2}
GLUCOSE SERPL-MCNC: 203 MG/DL (ref 70–99)
HCT VFR BLD AUTO: 33.4 % (ref 36–48)
HGB BLD-MCNC: 11.3 G/DL (ref 12–16)
INR PPP: 1.13 (ref 0.87–1.14)
LYMPHOCYTES # BLD: 1.2 K/UL (ref 1–5.1)
LYMPHOCYTES NFR BLD: 12.9 %
MCH RBC QN AUTO: 28.8 PG (ref 26–34)
MCHC RBC AUTO-ENTMCNC: 33.8 G/DL (ref 31–36)
MCV RBC AUTO: 85.3 FL (ref 80–100)
MONOCYTES # BLD: 0.5 K/UL (ref 0–1.3)
MONOCYTES NFR BLD: 5.7 %
NEUTROPHILS # BLD: 7.3 K/UL (ref 1.7–7.7)
NEUTROPHILS NFR BLD: 79.1 %
PLATELET # BLD AUTO: 204 K/UL (ref 135–450)
PMV BLD AUTO: 6.9 FL (ref 5–10.5)
POTASSIUM SERPL-SCNC: 3.7 MMOL/L (ref 3.5–5.1)
PROT SERPL-MCNC: 5.8 G/DL (ref 6.4–8.2)
PROTHROMBIN TIME: 14.4 SEC (ref 11.7–14.5)
RBC # BLD AUTO: 3.92 M/UL (ref 4–5.2)
SODIUM SERPL-SCNC: 137 MMOL/L (ref 136–145)
WBC # BLD AUTO: 9.2 K/UL (ref 4–11)

## 2023-03-23 PROCEDURE — 99285 EMERGENCY DEPT VISIT HI MDM: CPT

## 2023-03-23 PROCEDURE — 72125 CT NECK SPINE W/O DYE: CPT

## 2023-03-23 PROCEDURE — 85025 COMPLETE CBC W/AUTO DIFF WBC: CPT

## 2023-03-23 PROCEDURE — 71260 CT THORAX DX C+: CPT

## 2023-03-23 PROCEDURE — 6360000004 HC RX CONTRAST MEDICATION: Performed by: EMERGENCY MEDICINE

## 2023-03-23 PROCEDURE — 73502 X-RAY EXAM HIP UNI 2-3 VIEWS: CPT

## 2023-03-23 PROCEDURE — 85730 THROMBOPLASTIN TIME PARTIAL: CPT

## 2023-03-23 PROCEDURE — 70450 CT HEAD/BRAIN W/O DYE: CPT

## 2023-03-23 PROCEDURE — 80053 COMPREHEN METABOLIC PANEL: CPT

## 2023-03-23 PROCEDURE — 6370000000 HC RX 637 (ALT 250 FOR IP): Performed by: EMERGENCY MEDICINE

## 2023-03-23 PROCEDURE — 85610 PROTHROMBIN TIME: CPT

## 2023-03-23 RX ORDER — ACETAMINOPHEN 500 MG
1000 TABLET ORAL ONCE
Status: COMPLETED | OUTPATIENT
Start: 2023-03-23 | End: 2023-03-23

## 2023-03-23 RX ORDER — LOSARTAN POTASSIUM 100 MG/1
100 TABLET ORAL DAILY
Qty: 90 TABLET | Refills: 1 | Status: SHIPPED | OUTPATIENT
Start: 2023-03-23

## 2023-03-23 RX ORDER — PAROXETINE HYDROCHLORIDE 20 MG/1
20 TABLET, FILM COATED ORAL DAILY
Qty: 90 TABLET | Refills: 1 | Status: SHIPPED | OUTPATIENT
Start: 2023-03-23

## 2023-03-23 RX ADMIN — ACETAMINOPHEN 1000 MG: 500 TABLET ORAL at 08:19

## 2023-03-23 RX ADMIN — IOPAMIDOL 75 ML: 755 INJECTION, SOLUTION INTRAVENOUS at 09:39

## 2023-03-23 ASSESSMENT — LIFESTYLE VARIABLES
HOW MANY STANDARD DRINKS CONTAINING ALCOHOL DO YOU HAVE ON A TYPICAL DAY: 1 OR 2
HOW OFTEN DO YOU HAVE A DRINK CONTAINING ALCOHOL: MONTHLY OR LESS

## 2023-03-23 ASSESSMENT — PAIN DESCRIPTION - FREQUENCY: FREQUENCY: CONTINUOUS

## 2023-03-23 ASSESSMENT — PAIN DESCRIPTION - LOCATION
LOCATION: HIP;BACK;LEG
LOCATION: HIP;BACK
LOCATION: HIP

## 2023-03-23 ASSESSMENT — PAIN - FUNCTIONAL ASSESSMENT: PAIN_FUNCTIONAL_ASSESSMENT: 0-10

## 2023-03-23 ASSESSMENT — PAIN SCALES - GENERAL
PAINLEVEL_OUTOF10: 6
PAINLEVEL_OUTOF10: 4
PAINLEVEL_OUTOF10: 6

## 2023-03-23 ASSESSMENT — PAIN DESCRIPTION - DESCRIPTORS
DESCRIPTORS: ACHING
DESCRIPTORS: ACHING
DESCRIPTORS: THROBBING

## 2023-03-23 ASSESSMENT — PAIN DESCRIPTION - ORIENTATION
ORIENTATION: RIGHT
ORIENTATION: RIGHT;POSTERIOR
ORIENTATION: RIGHT

## 2023-03-23 ASSESSMENT — PAIN DESCRIPTION - PAIN TYPE: TYPE: ACUTE PAIN

## 2023-03-23 NOTE — ED NOTES
RN reviewed AVS with pt who denies concerns, ambulatory to wheel chair. Left with .      Anthony Salazar RN  03/23/23 [id number]

## 2023-03-23 NOTE — ED PROVIDER NOTES
[address] [facility]      CHIEF COMPLAINT  Rick Chester Dalton at home and slide down about 10 steps)      HISTORY OF PRESENT ILLNESS  Suellen Barba is a 82 y.o. female with a history of chronic kidney disease, diabetes, hypertension and hyperlipidemia presents after she fell down 14 stairs. Her  states she tends to wander at night because she has a hard time sleeping. She thought she was going into the bathroom but instead fell down the steps. She does not think she hit her head but is not sure. She did not lose consciousness. She is having right hip and lower back pain. She was able to get up with assistance and bear weight on that leg and walk. She is on baby aspirin but no additional blood thinners. .   No other complaints, modifying factors or associated symptoms. History obtained from the patient. I have reviewed the following from the nursing documentation.     Past Medical History:   Diagnosis Date    Acute kidney injury superimposed on CKD ([address] 3/8/2022    Anal fissure 01/07/2013    Back pain     Carotid stenosis 11/11/2021    Chronic insomnia     Chronic kidney disease, stage III (moderate) ([address] 5/17/2017    Depression     Diabetic retinopathy of both eyes ([address]     DM type 2 with diabetic dyslipidemia ([address] 2/5/2019    Fecal impaction ([address] 3/9/2022    Fecal impaction in rectum ([address] 3/8/2022    GERD (gastroesophageal reflux disease)     Dr. Demond Candelaria (GI)    Glossitis 03/16/2017    Hearing loss     Hypercholesteremia     Hypertension     Lumbar radiculopathy 03/03/2017    Osteopenia 1/29/2019    Patellofemoral stress syndrome of right knee 03/03/2017    Uncontrolled type 2 diabetes with retinopathy 8/3/2022     Past Surgical History:   Procedure Laterality Date    APPENDECTOMY      CHOLECYSTECTOMY      CHOLECYSTECTOMY      COLONOSCOPY  1/18/2016    Normal    HYSTERECTOMY, TOTAL ABDOMINAL (CERVIX REMOVED)      KNEE SURGERY  left knee    OVARY REMOVAL      unknown which Non-distended. Non-tender. No guarding or rebound. EXTREMITIES: No peripheral edema. MAEE. No acute deformities. Swelling and tenderness over the right greater trochanter. All 4 extremities are neurovascularly intact. SKIN: Ecchymosis noted over the right buttock and lateral hip. NEUROLOGICAL: Alert and oriented X 3. CN II-XII grossly intact. Strength 5/5, sensation intact. GCS is 15. PSYCHIATRIC: Normal mood and affect. LABS  I have reviewed all labs for this visit.    Results for orders placed or performed during the hospital encounter of 03/23/23   CBC with Auto Differential   Result Value Ref Range    WBC 9.2 4.0 - 11.0 K/uL    RBC 3.92 (L) 4.00 - 5.20 M/uL    Hemoglobin 11.3 (L) 12.0 - 16.0 g/dL    Hematocrit 33.4 (L) 36.0 - 48.0 %    MCV 85.3 80.0 - 100.0 fL    MCH 28.8 26.0 - 34.0 pg    MCHC 33.8 31.0 - 36.0 g/dL    RDW 14.2 12.4 - 15.4 %    Platelets 744 937 - 099 K/uL    MPV 6.9 5.0 - 10.5 fL    Neutrophils % 79.1 %    Lymphocytes % 12.9 %    Monocytes % 5.7 %    Eosinophils % 1.8 %    Basophils % 0.5 %    Neutrophils Absolute 7.3 1.7 - 7.7 K/uL    Lymphocytes Absolute 1.2 1.0 - 5.1 K/uL    Monocytes Absolute 0.5 0.0 - 1.3 K/uL    Eosinophils Absolute 0.2 0.0 - 0.6 K/uL    Basophils Absolute 0.0 0.0 - 0.2 K/uL   Comprehensive Metabolic Panel w/ Reflex to MG   Result Value Ref Range    Sodium 137 136 - 145 mmol/L    Potassium reflex Magnesium 3.7 3.5 - 5.1 mmol/L    Chloride 102 99 - 110 mmol/L    CO2 28 21 - 32 mmol/L    Anion Gap 7 3 - 16    Glucose 203 (H) 70 - 99 mg/dL    BUN 28 (H) 7 - 20 mg/dL    Creatinine 1.1 0.6 - 1.2 mg/dL    Est, Glom Filt Rate 50 (A) >60    Calcium 9.6 8.3 - 10.6 mg/dL    Total Protein 5.8 (L) 6.4 - 8.2 g/dL    Albumin 3.7 3.4 - 5.0 g/dL    Albumin/Globulin Ratio 1.8 1.1 - 2.2    Total Bilirubin 0.3 0.0 - 1.0 mg/dL    Alkaline Phosphatase 47 40 - 129 U/L    ALT 14 10 - 40 U/L    AST 26 15 - 37 U/L   APTT   Result Value Ref Range    aPTT 27.4 23.0 - 34.3 sec   Protime-INR

## 2023-03-23 NOTE — DISCHARGE INSTRUCTIONS
Your CAT scans are normal.  You do have a hematoma in the subcutaneous tissue in your right buttock. This also extends to the muscle below. Apply ice. Alternate Tylenol and Motrin for pain. I recommend putting a gate at the top of your stairs where you fell.

## 2023-03-25 DIAGNOSIS — E04.1 THYROID NODULE GREATER THAN OR EQUAL TO 1.5 CM IN DIAMETER INCIDENTALLY NOTED ON IMAGING STUDY: Primary | ICD-10-CM

## 2023-03-28 ENCOUNTER — TELEPHONE (OUTPATIENT)
Dept: FAMILY MEDICINE CLINIC | Age: 83
End: 2023-03-28

## 2023-03-28 NOTE — TELEPHONE ENCOUNTER
----- Message from Pa Lockett MD sent at 3/25/2023  7:06 AM EDT -----  Regarding: thyroid ultrasound  Please let patient know I got the notes from the ED explaining she needed a thyroid ultrasound for a nodule they incidentally found. I have ordered it for her if she would like to get that done: Patient scheduling phone #:  [phone].     ----- Message -----  From: Peggy Peoples MD  Sent: 3/24/2023   4:58 PM EDT  To: Pa Lockett MD    [facility], I saw this patient of yours in the ER after a fall. She had an incidental finding on CT of a thyroid nodule with outpatient ultrasound recommended. I wanted to make you aware to help facilitate the patient's follow-up and outpatient imaging. Thank you!   Martha Griggs

## 2023-03-28 NOTE — TELEPHONE ENCOUNTER
Patient aware, state she will get that scheduled. She states she fell down 14 flights of stairs and is in a significant amount of pain and wants to know if there is something she can take, states tylenol is not helping.

## 2023-03-29 ENCOUNTER — HOSPITAL ENCOUNTER (OUTPATIENT)
Dept: ULTRASOUND IMAGING | Age: 83
Discharge: HOME | End: 2023-03-29
Payer: MEDICARE

## 2023-03-29 DIAGNOSIS — E04.1 THYROID NODULE GREATER THAN OR EQUAL TO 1.5 CM IN DIAMETER INCIDENTALLY NOTED ON IMAGING STUDY: ICD-10-CM

## 2023-03-29 PROCEDURE — 76536 US EXAM OF HEAD AND NECK: CPT

## 2023-03-31 ENCOUNTER — TELEPHONE (OUTPATIENT)
Dept: INTERVENTIONAL RADIOLOGY/VASCULAR | Age: 83
End: 2023-03-31

## 2023-03-31 DIAGNOSIS — E04.1 THYROID NODULE: Primary | ICD-10-CM

## 2023-03-31 NOTE — TELEPHONE ENCOUNTER
Called and spoke to Elgin about upcoming procedure. Phone number used: [phone]  Procedure: thyroid  Approving Radiologist: Mireya Alfaro    Pt informed of the following:  Date of procedure: 4/6/2023  Arrival time of procedure: 0800  Time of procedure: 0830    On any blood thinners? Yes.  If yes: Aspirin  Instructed to hold thinners day of      Need SDS: No

## 2023-04-06 ENCOUNTER — HOSPITAL ENCOUNTER (OUTPATIENT)
Dept: ULTRASOUND IMAGING | Age: 83
Discharge: HOME | End: 2023-04-06

## 2023-04-06 ENCOUNTER — TELEPHONE (OUTPATIENT)
Dept: INTERVENTIONAL RADIOLOGY/VASCULAR | Age: 83
End: 2023-04-06

## 2023-04-06 DIAGNOSIS — E04.1 THYROID NODULE: ICD-10-CM

## 2023-04-06 NOTE — TELEPHONE ENCOUNTER
Returned pts call to reschedule thyroid bx. Pt is going to call ordering providers office and talk to them.  Will call back to schedule when ready

## 2023-04-06 NOTE — TELEPHONE ENCOUNTER
Attempted to call patient to schedule procedure. No answer left message for patient to call back.  Number used [phone]    Procedure: thyroid

## 2023-04-16 PROBLEM — E04.1 RIGHT THYROID NODULE: Status: ACTIVE | Noted: 2023-04-16

## 2023-04-19 ENCOUNTER — INITIAL CONSULT (OUTPATIENT)
Dept: SURGERY | Age: 83
End: 2023-04-19
Payer: MEDICARE

## 2023-04-19 VITALS
HEIGHT: 63 IN | SYSTOLIC BLOOD PRESSURE: 147 MMHG | DIASTOLIC BLOOD PRESSURE: 61 MMHG | WEIGHT: 124.3 LBS | HEART RATE: 57 BPM | BODY MASS INDEX: 22.02 KG/M2

## 2023-04-19 DIAGNOSIS — S30.0XXA TRAUMATIC HEMATOMA OF BUTTOCK, INITIAL ENCOUNTER: Primary | ICD-10-CM

## 2023-04-19 PROCEDURE — 99203 OFFICE O/P NEW LOW 30 MIN: CPT | Performed by: SURGERY

## 2023-04-19 PROCEDURE — 3074F SYST BP LT 130 MM HG: CPT | Performed by: SURGERY

## 2023-04-19 PROCEDURE — 3078F DIAST BP <80 MM HG: CPT | Performed by: SURGERY

## 2023-04-19 PROCEDURE — 1123F ACP DISCUSS/DSCN MKR DOCD: CPT | Performed by: SURGERY

## 2023-04-24 ENCOUNTER — HOSPITAL ENCOUNTER (OUTPATIENT)
Dept: ULTRASOUND IMAGING | Age: 83
Discharge: HOME | End: 2023-04-24
Payer: MEDICARE

## 2023-04-24 DIAGNOSIS — E04.1 THYROID NODULE: ICD-10-CM

## 2023-04-24 PROCEDURE — 88173 CYTOPATH EVAL FNA REPORT: CPT

## 2023-04-24 PROCEDURE — 88305 TISSUE EXAM BY PATHOLOGIST: CPT

## 2023-04-24 PROCEDURE — 60100 BIOPSY OF THYROID: CPT

## 2023-05-12 NOTE — PROGRESS NOTES
Attempted to reach the patient, left message, clinic number provided.   Patient called on-call provider with complaints of a cough lasting for a week. She states she is afebrile and no other complaints. She has been wheezing which is new as of this morning. States that she has a raspy rattly cough. States that she gets this every year. She mentioned that she is getting her second Covid vaccine in 10 days and would like to be started on something. Discussed with patient that it would be in her best int erest to get a chest x-ray to rule out pneumonia and to follow-up with her PCP early in the week. I called in azithromycin pack as well as a Medrol Dosepak upon reviewing her recent A1c which was well controlled. In addition an albuterol inhaler to help acutely. Patient verbalized understanding and will follow up with PCP in the near future.

## 2023-06-23 NOTE — TELEPHONE ENCOUNTER
LVM for pt to call our office back. Pt mom says that [facility] does not send over release forms if they have never seen the pt before. Informed pt mom I will call her back with a update. Pt mom verbalized understanding.     ----- Message from Kayla Bowie sent at 6/23/2023  8:37 AM CDT -----  Contact: Mom [phone]  Would like to receive medical advice.    Would they like a call back or a response via [facility]:  call back    Additional information: Calling to speak with the nurse regarding [facility] neurology referral. Mom states [facility] needs pt orders, demographics and visit notes. [facility] fax number is [phone].

## 2023-07-05 ENCOUNTER — OFFICE VISIT (OUTPATIENT)
Dept: FAMILY MEDICINE CLINIC | Age: 83
End: 2023-07-05
Payer: MEDICARE

## 2023-07-05 ENCOUNTER — TELEPHONE (OUTPATIENT)
Dept: FAMILY MEDICINE CLINIC | Age: 83
End: 2023-07-05

## 2023-07-05 VITALS
HEART RATE: 77 BPM | WEIGHT: 127.6 LBS | BODY MASS INDEX: 22.61 KG/M2 | OXYGEN SATURATION: 97 % | DIASTOLIC BLOOD PRESSURE: 60 MMHG | SYSTOLIC BLOOD PRESSURE: 130 MMHG | HEIGHT: 63 IN

## 2023-07-05 DIAGNOSIS — J06.9 VIRAL URI: Primary | ICD-10-CM

## 2023-07-05 DIAGNOSIS — J04.0 LARYNGITIS: ICD-10-CM

## 2023-07-05 PROCEDURE — 1123F ACP DISCUSS/DSCN MKR DOCD: CPT | Performed by: STUDENT IN AN ORGANIZED HEALTH CARE EDUCATION/TRAINING PROGRAM

## 2023-07-05 PROCEDURE — 3078F DIAST BP <80 MM HG: CPT | Performed by: STUDENT IN AN ORGANIZED HEALTH CARE EDUCATION/TRAINING PROGRAM

## 2023-07-05 PROCEDURE — 99213 OFFICE O/P EST LOW 20 MIN: CPT | Performed by: STUDENT IN AN ORGANIZED HEALTH CARE EDUCATION/TRAINING PROGRAM

## 2023-07-05 PROCEDURE — 3074F SYST BP LT 130 MM HG: CPT | Performed by: STUDENT IN AN ORGANIZED HEALTH CARE EDUCATION/TRAINING PROGRAM

## 2023-07-05 RX ORDER — METHYLPREDNISOLONE 4 MG/1
TABLET ORAL
Qty: 21 TABLET | Refills: 0 | Status: SHIPPED | OUTPATIENT
Start: 2023-07-05 | End: 2023-07-11

## 2023-07-05 NOTE — TELEPHONE ENCOUNTER
Pt advised she stated that she has been taking the robitussin and it is not providing any relief pt sounds very ill productive cough no voice 3-4 days. Scheduled for today 07/05/2023 at 430 with dr campbell will await on his approval and call pt back to inform her.

## 2023-07-05 NOTE — TELEPHONE ENCOUNTER
Patient is calling because she is having a productive cough she is wanted to know what you think she should take to help with this. She is leaving for vacation on Saturday and wanted to make sure that she was good to go.

## 2023-07-05 NOTE — PROGRESS NOTES
Patient: Ursula Moreno is a 82 y.o. female who presents today with the following Chief Complaint(s):  Chief Complaint   Patient presents with    Congestion    Cough         HPI    Patient reports rough non productive cough for the last several days. Has now almost completely lost her voice. No shortness of breath.  with symptoms the week prior but self resolved. Current Outpatient Medications   Medication Sig Dispense Refill    metoprolol succinate (TOPROL XL) 100 MG extended release tablet Take 1 tablet by mouth nightly 90 tablet 0    fenofibrate (TRIGLIDE) 160 MG tablet TAKE ONE TABLET BY MOUTH DAILY 90 tablet 3    PARoxetine (PAXIL) 20 MG tablet Take 1 tablet by mouth daily 90 tablet 1    losartan (COZAAR) 100 MG tablet Take 1 tablet by mouth daily 90 tablet 1    amLODIPine (NORVASC) 10 MG tablet TAKE ONE TABLET BY MOUTH DAILY 90 tablet 3    omeprazole (PRILOSEC) 10 MG delayed release capsule Take 1 capsule by mouth daily      clobetasol (TEMOVATE) 0.05 % cream Apply topically 2 times daily. 30 g 2    traZODone (DESYREL) 50 MG tablet Take 1 tablet by mouth nightly as needed for Sleep 30 tablet 2    diclofenac sodium (VOLTAREN) 1 % GEL Apply 2 g topically 4 times daily      magnesium oxide (MAG-OX) 400 MG tablet Take 1.25 tablets by mouth daily      atorvastatin (LIPITOR) 80 MG tablet Take 1 tablet by mouth at bedtime TAKE ONE TABLET BY MOUTH DAILY 90 tablet 3    Multiple Vitamins-Minerals (THERAPEUTIC MULTIVITAMIN-MINERALS) tablet Take 1 tablet by mouth daily      Probiotic Product (PROBIOTIC DAILY PO) Take by mouth      aspirin 81 MG EC tablet Take 1 tablet by mouth every evening      methylPREDNISolone (MEDROL DOSEPACK) 4 MG tablet Take by mouth. 21 tablet 0     No current facility-administered medications for this visit. Patient's past medical history, surgical history, family history, medications,  andallergies  were all reviewed and updated as appropriate today.       Review of Systems  All

## 2023-07-17 ENCOUNTER — OFFICE VISIT (OUTPATIENT)
Dept: FAMILY MEDICINE CLINIC | Age: 83
End: 2023-07-17
Payer: MEDICARE

## 2023-07-17 VITALS
BODY MASS INDEX: 22.15 KG/M2 | SYSTOLIC BLOOD PRESSURE: 120 MMHG | HEIGHT: 63 IN | OXYGEN SATURATION: 98 % | DIASTOLIC BLOOD PRESSURE: 58 MMHG | HEART RATE: 55 BPM | TEMPERATURE: 98.1 F | WEIGHT: 125 LBS

## 2023-07-17 DIAGNOSIS — J06.9 UPPER RESPIRATORY TRACT INFECTION, UNSPECIFIED TYPE: Primary | ICD-10-CM

## 2023-07-17 PROCEDURE — 3074F SYST BP LT 130 MM HG: CPT | Performed by: NURSE PRACTITIONER

## 2023-07-17 PROCEDURE — 3078F DIAST BP <80 MM HG: CPT | Performed by: NURSE PRACTITIONER

## 2023-07-17 PROCEDURE — 1123F ACP DISCUSS/DSCN MKR DOCD: CPT | Performed by: NURSE PRACTITIONER

## 2023-07-17 PROCEDURE — 99213 OFFICE O/P EST LOW 20 MIN: CPT | Performed by: NURSE PRACTITIONER

## 2023-07-17 RX ORDER — DOXYCYCLINE HYCLATE 100 MG
100 TABLET ORAL 2 TIMES DAILY
Qty: 14 TABLET | Refills: 0 | Status: SHIPPED | OUTPATIENT
Start: 2023-07-17 | End: 2023-07-24

## 2023-07-17 SDOH — ECONOMIC STABILITY: FOOD INSECURITY: WITHIN THE PAST 12 MONTHS, THE FOOD YOU BOUGHT JUST DIDN'T LAST AND YOU DIDN'T HAVE MONEY TO GET MORE.: NEVER TRUE

## 2023-07-17 SDOH — ECONOMIC STABILITY: FOOD INSECURITY: WITHIN THE PAST 12 MONTHS, YOU WORRIED THAT YOUR FOOD WOULD RUN OUT BEFORE YOU GOT MONEY TO BUY MORE.: NEVER TRUE

## 2023-07-17 SDOH — ECONOMIC STABILITY: INCOME INSECURITY: HOW HARD IS IT FOR YOU TO PAY FOR THE VERY BASICS LIKE FOOD, HOUSING, MEDICAL CARE, AND HEATING?: NOT HARD AT ALL

## 2023-07-17 ASSESSMENT — ENCOUNTER SYMPTOMS
WHEEZING: 0
RHINORRHEA: 0
SINUS PRESSURE: 0
VOMITING: 0
DIARRHEA: 0
SINUS PAIN: 0
COUGH: 1
SHORTNESS OF BREATH: 0
NAUSEA: 0
SORE THROAT: 0

## 2023-07-17 NOTE — PATIENT INSTRUCTIONS
Drink plenty of fluids   Get plenty of rest  Finish course of antibiotics   Take medications as prescribed   Go to nearest ER if develop shortness of breath, chest pain or significant worsening of symptoms   Notify office if symptoms worsen or do not improve   Flonase nasal spray daily, normal saline nasal spray as needed   Humidifier or steamy shower  Warm liquids with honey

## 2023-07-17 NOTE — PROGRESS NOTES
Sammy Rose (:  1940) is a 82 y.o. female,Established patient, here for evaluation of the following chief complaint(s):  Cough, Congestion, and Ear Fullness         ASSESSMENT/PLAN:  1. Upper respiratory tract infection, unspecified type  -     doxycycline hyclate (VIBRA-TABS) 100 MG tablet; Take 1 tablet by mouth 2 times daily for 7 days, Disp-14 tablet, R-0Normal    -Reviewed allergies, discussed antibiotic risks/benefits/side effects   -Discussed cough medications, patient has tessalon at home but feels this does not help, discussed prescription options. Patient reports she wants to try Delsym or Robitussin first and if these do not help will call back   -Discussed alarm symptoms   -Discussed symptom management   -Notify office if symptoms worsen or do not improve   Drink plenty of fluids   Get plenty of rest  Finish course of antibiotics   Take medications as prescribed   Go to nearest ER if develop shortness of breath, chest pain or significant worsening of symptoms   Notify office if symptoms worsen or do not improve   Flonase nasal spray daily, normal saline nasal spray as needed   Humidifier or steamy shower  Warm liquids with honey     Return if symptoms worsen or fail to improve. Subjective   SUBJECTIVE/OBJECTIVE:  Reports symptoms for over two weeks   Was seen in in office on  and was given prescription for steroid pack and finished that out. Went out of town and traveled by plane  Also taking Mucinex DM at home and \"2 or 3 days\" of  flonase, Tessalon   No smoker   No PMH for copd or asthma         Review of Systems   Constitutional:  Positive for fatigue. Negative for chills and fever. HENT:  Positive for postnasal drip. Negative for congestion, ear pain, rhinorrhea, sinus pressure, sinus pain and sore throat. Ears feel \"full, don't hurt\"      Respiratory:  Positive for cough. Negative for shortness of breath and wheezing.          Productive for green sputum

## 2023-07-24 ENCOUNTER — OFFICE VISIT (OUTPATIENT)
Dept: FAMILY MEDICINE CLINIC | Age: 83
End: 2023-07-24
Payer: MEDICARE

## 2023-07-24 VITALS
BODY MASS INDEX: 22.32 KG/M2 | HEIGHT: 63 IN | TEMPERATURE: 98.6 F | OXYGEN SATURATION: 95 % | WEIGHT: 126 LBS | HEART RATE: 54 BPM | DIASTOLIC BLOOD PRESSURE: 58 MMHG | SYSTOLIC BLOOD PRESSURE: 116 MMHG

## 2023-07-24 DIAGNOSIS — J01.90 ACUTE SINUSITIS WITH SYMPTOMS GREATER THAN 10 DAYS: ICD-10-CM

## 2023-07-24 DIAGNOSIS — F32.1 MDD (MAJOR DEPRESSIVE DISORDER), SINGLE EPISODE, MODERATE (HCC): ICD-10-CM

## 2023-07-24 DIAGNOSIS — Z78.0 POSTMENOPAUSAL STATE: ICD-10-CM

## 2023-07-24 DIAGNOSIS — I12.9 TYPE 2 DM WITH CKD STAGE 3 AND HYPERTENSION (HCC): ICD-10-CM

## 2023-07-24 DIAGNOSIS — N18.30 TYPE 2 DM WITH CKD STAGE 3 AND HYPERTENSION (HCC): ICD-10-CM

## 2023-07-24 DIAGNOSIS — E11.22 TYPE 2 DM WITH CKD STAGE 3 AND HYPERTENSION (HCC): ICD-10-CM

## 2023-07-24 DIAGNOSIS — E83.42 HYPOMAGNESEMIA: ICD-10-CM

## 2023-07-24 DIAGNOSIS — Z00.00 MEDICARE ANNUAL WELLNESS VISIT, SUBSEQUENT: Primary | ICD-10-CM

## 2023-07-24 DIAGNOSIS — F51.04 CHRONIC INSOMNIA: ICD-10-CM

## 2023-07-24 DIAGNOSIS — E11.65 UNCONTROLLED TYPE 2 DIABETES MELLITUS WITH HYPERGLYCEMIA (HCC): ICD-10-CM

## 2023-07-24 LAB — HBA1C MFR BLD: 7.9 %

## 2023-07-24 PROCEDURE — G0439 PPPS, SUBSEQ VISIT: HCPCS | Performed by: FAMILY MEDICINE

## 2023-07-24 PROCEDURE — 1123F ACP DISCUSS/DSCN MKR DOCD: CPT | Performed by: FAMILY MEDICINE

## 2023-07-24 PROCEDURE — 3078F DIAST BP <80 MM HG: CPT | Performed by: FAMILY MEDICINE

## 2023-07-24 PROCEDURE — 3051F HG A1C>EQUAL 7.0%<8.0%: CPT | Performed by: FAMILY MEDICINE

## 2023-07-24 PROCEDURE — 99214 OFFICE O/P EST MOD 30 MIN: CPT | Performed by: FAMILY MEDICINE

## 2023-07-24 PROCEDURE — 83037 HB GLYCOSYLATED A1C HOME DEV: CPT | Performed by: FAMILY MEDICINE

## 2023-07-24 PROCEDURE — 3074F SYST BP LT 130 MM HG: CPT | Performed by: FAMILY MEDICINE

## 2023-07-24 RX ORDER — QUETIAPINE FUMARATE 50 MG/1
50 TABLET, FILM COATED ORAL NIGHTLY
Qty: 30 TABLET | Refills: 3 | Status: SHIPPED | OUTPATIENT
Start: 2023-07-24

## 2023-07-24 RX ORDER — LEVOFLOXACIN 500 MG/1
500 TABLET, FILM COATED ORAL DAILY
Qty: 5 TABLET | Refills: 0 | Status: SHIPPED | OUTPATIENT
Start: 2023-07-24 | End: 2023-07-29

## 2023-07-24 ASSESSMENT — PATIENT HEALTH QUESTIONNAIRE - PHQ9
6. FEELING BAD ABOUT YOURSELF - OR THAT YOU ARE A FAILURE OR HAVE LET YOURSELF OR YOUR FAMILY DOWN: 1
SUM OF ALL RESPONSES TO PHQ9 QUESTIONS 1 & 2: 4
2. FEELING DOWN, DEPRESSED OR HOPELESS: 1
SUM OF ALL RESPONSES TO PHQ QUESTIONS 1-9: 10
SUM OF ALL RESPONSES TO PHQ QUESTIONS 1-9: 10
10. IF YOU CHECKED OFF ANY PROBLEMS, HOW DIFFICULT HAVE THESE PROBLEMS MADE IT FOR YOU TO DO YOUR WORK, TAKE CARE OF THINGS AT HOME, OR GET ALONG WITH OTHER PEOPLE: 0
4. FEELING TIRED OR HAVING LITTLE ENERGY: 1
1. LITTLE INTEREST OR PLEASURE IN DOING THINGS: 3
5. POOR APPETITE OR OVEREATING: 0
SUM OF ALL RESPONSES TO PHQ QUESTIONS 1-9: 10
9. THOUGHTS THAT YOU WOULD BE BETTER OFF DEAD, OR OF HURTING YOURSELF: 0
SUM OF ALL RESPONSES TO PHQ QUESTIONS 1-9: 10
8. MOVING OR SPEAKING SO SLOWLY THAT OTHER PEOPLE COULD HAVE NOTICED. OR THE OPPOSITE, BEING SO FIGETY OR RESTLESS THAT YOU HAVE BEEN MOVING AROUND A LOT MORE THAN USUAL: 0
7. TROUBLE CONCENTRATING ON THINGS, SUCH AS READING THE NEWSPAPER OR WATCHING TELEVISION: 1
3. TROUBLE FALLING OR STAYING ASLEEP: 3

## 2023-07-24 ASSESSMENT — LIFESTYLE VARIABLES
HOW OFTEN DO YOU HAVE A DRINK CONTAINING ALCOHOL: 2-3 TIMES A WEEK
HOW MANY STANDARD DRINKS CONTAINING ALCOHOL DO YOU HAVE ON A TYPICAL DAY: 1 OR 2

## 2023-07-24 NOTE — PROGRESS NOTES
Medicare Annual Wellness Visit    Jose L Corea is here for Diabetes    Assessment & Plan   Medicare annual wellness visit, subsequent  Recommendations for Preventive Services Due: see orders and patient instructions/AVS.  Recommended screening schedule for the next 5-10 years is provided to the patient in written form: see Patient Instructions/AVS.  Uncontrolled type 2 diabetes mellitus with hyperglycemia ([address])  Discussed restarting medication as her level is uncontrolled today. I suspect recent oral steroids have played a role in her increased hemoglobin A1C. She will continue with diet and exercise control, as she declines restarting medication today. We will recheck in 3 months. Acute sinusitis with symptoms greater than 10 days  -     levoFLOXacin (LEVAQUIN) 500 MG tablet; Take 1 tablet by mouth daily for 5 days, Disp-5 tablet, R-0Normal  Call or return to clinic prn if these symptoms worsen or fail to improve as anticipated. Type 2 DM with CKD stage 3 and hypertension (HCC)  -     POCT glycosylated hemoglobin (Hb A1C)  HTN doing well. CKD improving on her last blood work with the discontinuation of Maxzide. Will continue to monitor. Hypomagnesemia  Not currently on any magnesium. She states this will be checked by cardiology next month. Discussed that as she is no longer on Maxzide, this may have improve. Postmenopausal state  -     DEXA BONE DENSITY AXIAL SKELETON; Future  Chronic insomnia  -     QUEtiapine (SEROQUEL) 50 MG tablet; Take 1 tablet by mouth at bedtime, Disp-30 tablet, R-3Normal  D/c trazodone due to lack of effectiveness. Trial of Seroquel. Would benefit from CBT-I, which she has previously declined. MDD (major depressive disorder), single episode, moderate (HCC)  Continue Paxil, add Seroquel for sleep, may also help with depressive symptoms. Return in about 3 months (around 10/24/2023) for Diabetes.      Subjective   The following acute and/or chronic problems were also

## 2023-07-25 PROBLEM — F32.1 MDD (MAJOR DEPRESSIVE DISORDER), SINGLE EPISODE, MODERATE (HCC): Status: ACTIVE | Noted: 2023-07-25

## 2023-07-27 ENCOUNTER — TELEPHONE (OUTPATIENT)
Dept: FAMILY MEDICINE CLINIC | Age: 83
End: 2023-07-27

## 2023-07-27 NOTE — TELEPHONE ENCOUNTER
----- Message from Romy Estrada sent at 7/27/2023  9:41 AM EDT -----  Subject: Medication Problem    Medication: Other - none that she remembers  Dosage: none  Ordering Provider: lucius    Question/Problem: Patient is felling better with antibiotic but has very   thick phlegm that makes her cough, not discolored. Wants something for   that.       Pharmacy: [facility] [phone] - [address] [facility] [place] [phone]    ---------------------------------------------------------------------------  --------------  Lois VITAL  [phone]; OK to leave message on voicemail  ---------------------------------------------------------------------------  --------------    SCRIPT ANSWERS  Relationship to Patient: Self

## 2023-07-27 NOTE — TELEPHONE ENCOUNTER
The best thing would be plain Mucinex or Robitussin to help break up the mucus, which she can get over-the-counter.

## 2023-07-28 ENCOUNTER — OFFICE VISIT (OUTPATIENT)
Dept: SURGERY | Age: 83
End: 2023-07-28
Payer: MEDICARE

## 2023-07-28 VITALS
DIASTOLIC BLOOD PRESSURE: 56 MMHG | HEIGHT: 63 IN | HEART RATE: 56 BPM | BODY MASS INDEX: 22.86 KG/M2 | OXYGEN SATURATION: 98 % | RESPIRATION RATE: 16 BRPM | SYSTOLIC BLOOD PRESSURE: 119 MMHG | WEIGHT: 129 LBS | TEMPERATURE: 97.3 F

## 2023-07-28 DIAGNOSIS — K60.2 ANAL FISSURE: Primary | ICD-10-CM

## 2023-07-28 PROCEDURE — 1123F ACP DISCUSS/DSCN MKR DOCD: CPT | Performed by: SURGERY

## 2023-07-28 PROCEDURE — 3078F DIAST BP <80 MM HG: CPT | Performed by: SURGERY

## 2023-07-28 PROCEDURE — 3074F SYST BP LT 130 MM HG: CPT | Performed by: SURGERY

## 2023-07-28 PROCEDURE — 99212 OFFICE O/P EST SF 10 MIN: CPT | Performed by: SURGERY

## 2023-07-28 NOTE — PROGRESS NOTES
Subjective:     Patient is a 82 y.o. woman with anal fissure     HPI: Ms. Reyna Bolaños is here to follow up regarding her anal fissure. She was started back on topical CCB about 1 month ago. Since then she reports better. Pain resolved.  She is staying regular on fiber     Patient Active Problem List    Diagnosis Date Noted    Lichen sclerosus of female genitalia 01/10/2023    Type 2 DM with CKD stage 3 and hypertension ([address]) 10/06/2022    Uncontrolled type 2 diabetes with retinopathy 08/03/2022    MDD (major depressive disorder), single episode, moderate ([address]) 07/25/2023    Right thyroid nodule 04/16/2023    Coronary artery disease involving native coronary artery of native heart without angina pectoris 01/03/2022    SOB (shortness of breath)     Abnormal ECG     Other chest pain 11/29/2021    Carotid stenosis 11/11/2021    Diabetic retinopathy of both eyes ([address])     Uncontrolled type 2 diabetes mellitus with hyperglycemia ([address]) 02/05/2019    DM type 2 with diabetic dyslipidemia ([address]) 02/05/2019    GERD (gastroesophageal reflux disease) 01/29/2019    Extrinsic asthma 01/29/2019    Hyperlipidemia 01/29/2019    Hypertension 01/29/2019    Osteopenia 01/29/2019    Insomnia     Chronic insomnia     Chronic kidney disease, stage III (moderate) ([address]) 05/17/2017    Primary osteoarthritis of right knee 03/03/2017     Past Medical History:   Diagnosis Date    Acute kidney injury superimposed on CKD ([address]) 3/8/2022    Anal fissure 01/07/2013    Back pain     Carotid stenosis 11/11/2021    Chronic insomnia     Chronic kidney disease, stage III (moderate) ([address]) 5/17/2017    Depression     Diabetic retinopathy of both eyes ([address])     DM type 2 with diabetic dyslipidemia ([address]) 2/5/2019    Fecal impaction ([address]) 3/9/2022    Fecal impaction in rectum ([address]) 3/8/2022    GERD (gastroesophageal reflux disease)     Dr. Dav Schreiber (GI)    Glossitis 03/16/2017    Hearing loss     Hypercholesteremia     Hypertension     Lumbar radiculopathy 03/03/2017

## 2023-08-07 ENCOUNTER — TELEPHONE (OUTPATIENT)
Dept: FAMILY MEDICINE CLINIC | Age: 83
End: 2023-08-07

## 2023-08-07 RX ORDER — BENZONATATE 200 MG/1
200 CAPSULE ORAL 3 TIMES DAILY PRN
Qty: 30 CAPSULE | Refills: 0 | Status: SHIPPED | OUTPATIENT
Start: 2023-08-07 | End: 2023-08-17

## 2023-08-07 RX ORDER — METHYLPREDNISOLONE 4 MG/1
TABLET ORAL
Qty: 1 KIT | Refills: 0 | Status: SHIPPED | OUTPATIENT
Start: 2023-08-07 | End: 2023-08-13

## 2023-08-07 NOTE — TELEPHONE ENCOUNTER
I sent in a Medrol pack and tessalon for her. If that doesn't help, please tell her to call us back.

## 2023-08-09 ENCOUNTER — HOSPITAL ENCOUNTER (OUTPATIENT)
Dept: WOMENS IMAGING | Age: 83
Discharge: HOME | End: 2023-08-09
Payer: MEDICARE

## 2023-08-09 DIAGNOSIS — Z78.0 POSTMENOPAUSAL STATE: ICD-10-CM

## 2023-08-09 PROCEDURE — 77080 DXA BONE DENSITY AXIAL: CPT

## 2023-08-15 ENCOUNTER — OFFICE VISIT (OUTPATIENT)
Dept: CARDIOLOGY CLINIC | Age: 83
End: 2023-08-15
Payer: MEDICARE

## 2023-08-15 VITALS
SYSTOLIC BLOOD PRESSURE: 136 MMHG | HEART RATE: 59 BPM | BODY MASS INDEX: 22.68 KG/M2 | OXYGEN SATURATION: 94 % | WEIGHT: 128 LBS | HEIGHT: 63 IN | DIASTOLIC BLOOD PRESSURE: 54 MMHG

## 2023-08-15 DIAGNOSIS — I10 PRIMARY HYPERTENSION: ICD-10-CM

## 2023-08-15 DIAGNOSIS — E78.2 MIXED HYPERLIPIDEMIA: ICD-10-CM

## 2023-08-15 DIAGNOSIS — I65.22 STENOSIS OF LEFT CAROTID ARTERY: ICD-10-CM

## 2023-08-15 DIAGNOSIS — I65.23 BILATERAL CAROTID ARTERY STENOSIS: ICD-10-CM

## 2023-08-15 DIAGNOSIS — I25.10 CORONARY ARTERY DISEASE INVOLVING NATIVE CORONARY ARTERY OF NATIVE HEART WITHOUT ANGINA PECTORIS: Primary | ICD-10-CM

## 2023-08-15 PROCEDURE — 3078F DIAST BP <80 MM HG: CPT | Performed by: INTERNAL MEDICINE

## 2023-08-15 PROCEDURE — 99214 OFFICE O/P EST MOD 30 MIN: CPT | Performed by: INTERNAL MEDICINE

## 2023-08-15 PROCEDURE — 3075F SYST BP GE 130 - 139MM HG: CPT | Performed by: INTERNAL MEDICINE

## 2023-08-15 PROCEDURE — 93000 ELECTROCARDIOGRAM COMPLETE: CPT | Performed by: INTERNAL MEDICINE

## 2023-08-15 PROCEDURE — 1123F ACP DISCUSS/DSCN MKR DOCD: CPT | Performed by: INTERNAL MEDICINE

## 2023-08-15 RX ORDER — TRAZODONE HYDROCHLORIDE 50 MG/1
50 TABLET ORAL NIGHTLY
COMMUNITY

## 2023-08-15 NOTE — PATIENT INSTRUCTIONS
Plan:  Repeat EKG today   Repeat carotid dopplers   Continue current medications   Recommend having repeat labs through Chata Hnog MD at next appointment including magnesium levels   Repeat carotid dopplers prior to next visit in 9 months   Follow up with me in 9 months       Your provider has ordered testing for further evaluation. An order/prescription has been included in your paper work. To schedule outpatient testing, contact Central Scheduling by calling [employer] ([phone]).

## 2023-08-15 NOTE — PROGRESS NOTES
[facility]   Cardiac Follow UP    Referring Provider: Rustam Velazco CNP. Chief Complaint   Patient presents with    Follow-up    Hyperlipidemia    Hypertension    Coronary Artery Disease    Other     Cough 7 weeks     Initially saw as new patient at the request of Dr. Katie Gan for fatigue, CAMILO, and abnormal EKG in 11/21. SUBJECTIVE: Ms. Shane Hernandez has PMH NOCAD, HTN, HLD, DM II, CKD, GERD; c/o baseline fatigue. History of Present Illness:   Juliet Diaz is a 82 y.o. female who has [address] dx 12/21, HTN, HLD, elevated TG, borderline DM, Left carotid disease (50-69%), and GERD. GXT myoview 8/26/2010 negative for ischemia. ECHO 12/8/2021 EF=62%-  Frequent PVC's; Due to ongoing CAMILO, multiple CAD RF's, and abnormal T inversion on EKG underwent LHC with Dr. Rosey Valdez 12/8/2021 showing NOCAD (20% and 40-50% LAD, 30% CCx, 30% RCA stenoses). She underwent sleep study and did not have sleep apnea. Her Mg+ was 1.40 3/10/2022 and same on 1/9/23. Last visit she reported having decreased energy levels. Carotid dopplers 3/14/23- bilateral internal carotid arteries reveal a 50-69% diameter reducing stenoses (prior 2/22 Left 50-60%; Right <50%)   Today her weight is down 5 lbs since her last visit. She has been sick with a cold and has been on 2 steroid packs as well as antibiotics. Her  thinks she lost weight due to being ill. She stopped taking fish oil due to causing itching. She continues to have baseline daily fatigue and is not interested in a sleep study. Patient currently denies any weight gain, edema, palpitations, chest pain, shortness of breath, dizziness, and syncope.     Weight today 128#    Past Medical History:   has a past medical history of Acute kidney injury superimposed on CKD ([address]), Anal fissure, Back pain, Carotid stenosis, Chronic insomnia, Chronic kidney disease, stage III (moderate) ([address]), Depression, Diabetic retinopathy of both eyes ([address]), DM type 2 with diabetic dyslipidemia ([address]),

## 2023-09-03 NOTE — TELEPHONE ENCOUNTER
----- Message from Dorita Gutierrez sent at 10/14/2020 11:15 AM EDT -----  Subject: Appointment Request    Reason for Call: Routine Medicare AWV    QUESTIONS  Type of Appointment? Established Patient  Reason for appointment request? No appointments available during search  Additional Information for Provider? says ecc cannot book appointment type   needs wellness physical   ---------------------------------------------------------------------------  --------------  CALL BACK INFO  What is the best way for the office to contact you? OK to leave message on   voicemail  Preferred Call Back Phone Number? [phone]  ---------------------------------------------------------------------------  --------------  SCRIPT ANSWERS  Relationship to Patient? Self  Appointment reason? Well Care/Follow Ups  Select a Well Care/Follow Ups appointment reason? Adult Physical Exam   [Medicare Annual Wellness   AWV   PAP   Pelvic]  (If the patient is Medicare / 65+ ask this question) Are you requesting a   Medicare Annual Wellness Visit? Yes   (If the patient is female   ask this question) Are you requesting a pap smear with your physical   exam? No  (Is the patient requesting their annual physical and does not need PAP or   AWV per above)? Yes   Have you been diagnosed with   tested for   or told that you are suspected of having COVID-19 (Coronavirus)? No  Have you had a fever or taken medication to treat a fever within the past   3 days? No  Have you had a cough   shortness of breath or flu-like symptoms within the past 3 days? No  Do you currently have flu-like symptoms including fever or chills   cough   shortness of breath   or difficulty breathing   or new loss of taste or smell? No  (Service Expert  click yes below to proceed with [employer] As Usual   Scheduling)?  Yes 03-Sep-2023 01:10:06

## 2023-09-07 DIAGNOSIS — I10 ESSENTIAL HYPERTENSION: ICD-10-CM

## 2023-09-08 RX ORDER — METOPROLOL SUCCINATE 100 MG/1
TABLET, EXTENDED RELEASE ORAL
Qty: 90 TABLET | Refills: 3 | Status: SHIPPED | OUTPATIENT
Start: 2023-09-08

## 2023-09-14 RX ORDER — PAROXETINE HYDROCHLORIDE 20 MG/1
20 TABLET, FILM COATED ORAL DAILY
Qty: 90 TABLET | Refills: 1 | Status: SHIPPED | OUTPATIENT
Start: 2023-09-14

## 2023-09-14 RX ORDER — LOSARTAN POTASSIUM 100 MG/1
100 TABLET ORAL DAILY
Qty: 90 TABLET | Refills: 1 | Status: SHIPPED | OUTPATIENT
Start: 2023-09-14

## 2023-09-14 NOTE — TELEPHONE ENCOUNTER
Refill Request     CONFIRM preferred pharmacy with the patient. If Mail Order Rx - Pend for 90 day refill. Last Seen: Last Seen Department: 7/24/2023  Last Seen by PCP: 7/24/2023    Last Written:   Losartan-03/23/2023 90 tab 1 refills   Paxil-03/23/2023 90 tab 1 refills     If no future appointment scheduled:  Review the last OV with PCP and review information for follow-up visit,  Route STAFF MESSAGE with patient name to the [facility] for scheduling with the following information:            -  Timing of next visit           -  Visit type ie Physical, OV, etc           -  Diagnoses/Reason ie. COPD, HTN - Do not use MEDICATION, Follow-up or CHECK UP - Give reason for visit      Next Appointment:   No future appointments. Message sent to Omkar Shipley to schedule appt with patient? YES  Return in about 3 months (around 7/13/2023) for Diabetes.     Requested Prescriptions     Pending Prescriptions Disp Refills    losartan (COZAAR) 100 MG tablet [Pharmacy Med Name: LOSARTAN POTASSIUM 100 MG TAB] 90 tablet 1     Sig: TAKE ONE TABLET BY MOUTH DAILY    PARoxetine (PAXIL) 20 MG tablet [Pharmacy Med Name: PARoxetine HCL 20 MG TABLET] 90 tablet 1     Sig: TAKE ONE TABLET BY MOUTH DAILY
Scheduled
coffee/tea

## 2023-10-09 DIAGNOSIS — E11.65 UNCONTROLLED TYPE 2 DIABETES MELLITUS WITH HYPERGLYCEMIA (HCC): ICD-10-CM

## 2023-10-09 RX ORDER — ATORVASTATIN CALCIUM 40 MG/1
TABLET, FILM COATED ORAL
Qty: 90 TABLET | Refills: 1 | OUTPATIENT
Start: 2023-10-09

## 2023-10-09 NOTE — TELEPHONE ENCOUNTER
Refill Request     CONFIRM preferred pharmacy with the patient. If Mail Order Rx - Pend for 90 day refill. Last Seen: Last Seen Department: 7/24/2023  Last Seen by PCP: 7/24/2023    Last Written: 8/25/2022 90 tab 3 refills    If no future appointment scheduled:  Review the last OV with PCP and review information for follow-up visit,  Route STAFF MESSAGE with patient name to the [facility] for scheduling with the following information:            -  Timing of next visit           -  Visit type ie Physical, OV, etc           -  Diagnoses/Reason ie. COPD, HTN - Do not use MEDICATION, Follow-up or CHECK UP - Give reason for visit      Next Appointment:   Future Appointments   Date Time Provider [address] [place] [address]   1/4/2024  1:00 PM MD TOBIAS King  [place] - [place]       Message sent to [address] [place] [address] to schedule appt with patient?   NO      Requested Prescriptions     Pending Prescriptions Disp Refills    atorvastatin (LIPITOR) 40 MG tablet [Pharmacy Med Name: ATORVASTATIN 40 MG TABLET] 90 tablet 3     Sig: TAKE ONE TABLET BY MOUTH DAILY

## 2023-10-12 DIAGNOSIS — E11.65 UNCONTROLLED TYPE 2 DIABETES MELLITUS WITH HYPERGLYCEMIA (HCC): ICD-10-CM

## 2023-10-13 RX ORDER — ATORVASTATIN CALCIUM 40 MG/1
TABLET, FILM COATED ORAL
Qty: 90 TABLET | Refills: 3 | OUTPATIENT
Start: 2023-10-13

## 2023-10-13 NOTE — TELEPHONE ENCOUNTER
Refill Request     CONFIRM preferred pharmacy with the patient. If Mail Order Rx - Pend for 90 day refill. Last Seen: Last Seen Department: 7/24/2023  Last Seen by PCP: 7/24/2023    Last Written: 08/25/2022 90 tab 3 refills     If no future appointment scheduled:  Review the last OV with PCP and review information for follow-up visit,  Route STAFF MESSAGE with patient name to the [facility] for scheduling with the following information:            -  Timing of next visit           -  Visit type ie Physical, OV, etc           -  Diagnoses/Reason ie. COPD, HTN - Do not use MEDICATION, Follow-up or CHECK UP - Give reason for visit      Next Appointment:   Future Appointments   Date Time Provider [address] [place] [address]   1/4/2024  1:00 PM MD TOBIAS Mcgee  [facility] [employer] [facility]       Message sent to [address] [place] [address] to schedule appt with patient? YES  Return in about 3 months (around 10/24/2023) for Diabetes.     Requested Prescriptions     Pending Prescriptions Disp Refills    atorvastatin (LIPITOR) 40 MG tablet [Pharmacy Med Name: ATORVASTATIN 40 MG TABLET] 90 tablet 3     Sig: TAKE ONE TABLET BY MOUTH DAILY

## 2023-10-18 RX ORDER — ALENDRONATE SODIUM 70 MG/1
70 TABLET ORAL
Qty: 12 TABLET | Refills: 1 | Status: SHIPPED | OUTPATIENT
Start: 2023-10-18

## 2023-10-23 DIAGNOSIS — E78.2 MIXED HYPERLIPIDEMIA: ICD-10-CM

## 2023-10-23 DIAGNOSIS — E11.65 UNCONTROLLED TYPE 2 DIABETES MELLITUS WITH HYPERGLYCEMIA (HCC): ICD-10-CM

## 2023-10-23 RX ORDER — ATORVASTATIN CALCIUM 40 MG/1
TABLET, FILM COATED ORAL
Qty: 90 TABLET | Refills: 3 | OUTPATIENT
Start: 2023-10-23

## 2023-10-23 RX ORDER — ATORVASTATIN CALCIUM 80 MG/1
80 TABLET, FILM COATED ORAL NIGHTLY
Qty: 90 TABLET | Refills: 3 | Status: SHIPPED | OUTPATIENT
Start: 2023-10-23

## 2023-10-23 NOTE — TELEPHONE ENCOUNTER
Refill Request     CONFIRM preferred pharmacy with the patient. If Mail Order Rx - Pend for 90 day refill. Last Seen: Last Seen Department: 7/24/2023  Last Seen by PCP: 7/24/2023    Last Written: 8/25/2022, #90, 3 refills    If no future appointment scheduled:  Review the last OV with PCP and review information for follow-up visit,  Route STAFF MESSAGE with patient name to the [facility] for scheduling with the following information:            -  Timing of next visit           -  Visit type ie Physical, OV, etc           -  Diagnoses/Reason ie. COPD, HTN - Do not use MEDICATION, Follow-up or CHECK UP - Give reason for visit      Next Appointment:   Future Appointments   Date Time Provider [address] [place] [address]   1/4/2024  1:00 PM Donal Mcintyre MD [facility] - [facility]       Message sent to [address] [place] [address] to schedule appt with patient?   N/A      Requested Prescriptions     Pending Prescriptions Disp Refills    atorvastatin (LIPITOR) 40 MG tablet [Pharmacy Med Name: ATORVASTATIN 40 MG TABLET] 90 tablet 3     Sig: TAKE ONE TABLET BY MOUTH DAILY

## 2023-10-24 ENCOUNTER — TELEPHONE (OUTPATIENT)
Dept: FAMILY MEDICINE CLINIC | Age: 83
End: 2023-10-24

## 2023-10-24 NOTE — TELEPHONE ENCOUNTER
Dose was increased to 80 mg in August of 2022, so should have been what she has been taking for over the past year. Here is note about it from Dr. Pat Nguyen:    \"----- Message from Odean Nyhan, MD sent at 8/24/2022  4:52 PM EDT -----  Please let Connie Domingo know I reviewed her lab work. Her LDL is slightly elevated, triglycerides are significantly elevated. Liver function is normal.  Magnesium very low. Looks like Inocencio Leggett was recently added for triglyceride management, lets make sure she started that. She does have coronary artery disease as well as carotid disease and recommendation will be to increase Lipitor to 80 mg nightly if tolerated. I would also recommend magnesium replacement at 400 mg once daily over-the-counter. Repeat magnesium level in 2 weeks. Repeat lipids 6 weeks. \"      My recent notes from her DEXA scan regarding the new medication, which was explained to her per staff notes:    Tom Minaya [place]   10/18/2023 10:34 AM EDT       Patient informed and verbalized good understanding. Patient will call her dentis and let us know what they advise on fosomax. Patient would like to know if she is okay to get her covid vaccine as well as her , please advise. Amparo Jimenez MD   10/18/2023 10:16 AM EDT       Bone density test with osteopenia and increased risk for hip fracture based on her scores. I suggest taking medication to increase her bone density called Fosamax. This should help lower her risk of breaking bones. She will need to discuss this with her dentist first and make sure they are ok with her taking the medication. It is a once a week pill that should help prevent the development of osteoporosis for her. We will need to repeat a DEXA scan in 2 years.

## 2023-10-24 NOTE — TELEPHONE ENCOUNTER
Patient called in with questions on the Atorvastatin and Fosamax, which dosage is she supposed to be doing with the Atorvastatin? Stated she is usually on 40mg and now it's 80mg. And with the Fosamax, she didn't remember being prescribed that. Please advise. Thanks!

## 2023-11-23 DIAGNOSIS — F51.04 CHRONIC INSOMNIA: ICD-10-CM

## 2023-11-23 NOTE — TELEPHONE ENCOUNTER
.Refill Request     CONFIRM preferred pharmacy with the patient. If Mail Order Rx - Pend for 90 day refill. Last Seen: Last Seen Department: 7/24/2023  Last Seen by PCP: 7/24/2023    Last Written: 7/24/23 30 with 3    If no future appointment scheduled:  Review the last OV with PCP and review information for follow-up visit,  Route STAFF MESSAGE with patient name to the [facility] for scheduling with the following information:            -  Timing of next visit           -  Visit type ie Physical, OV, etc           -  Diagnoses/Reason ie. COPD, HTN - Do not use MEDICATION, Follow-up or CHECK UP - Give reason for visit      Next Appointment:   Future Appointments   Date Time Provider [address]   12/12/2023 11:30 AM MD TOBIAS Short  [place] - [place]   1/4/2024  1:00 PM MD TOBIAS Short  [place] - [place]       Message sent to [address] [place] [address] to schedule appt with patient?   N/A      Requested Prescriptions     Pending Prescriptions Disp Refills    QUEtiapine (SEROQUEL) 50 MG tablet [Pharmacy Med Name: QUEtiapine FUMARATE 50 MG TAB] 90 tablet 1     Sig: TAKE 1 TABLET BY MOUTH AT BEDTIME

## 2023-11-24 RX ORDER — QUETIAPINE FUMARATE 50 MG/1
50 TABLET, FILM COATED ORAL NIGHTLY
Qty: 90 TABLET | Refills: 0 | Status: SHIPPED | OUTPATIENT
Start: 2023-11-24

## 2023-12-12 DIAGNOSIS — E83.42 HYPOMAGNESEMIA: ICD-10-CM

## 2023-12-12 LAB — MAGNESIUM SERPL-MCNC: 1.8 MG/DL (ref 1.8–2.4)

## 2024-01-04 ENCOUNTER — OFFICE VISIT (OUTPATIENT)
Dept: FAMILY MEDICINE CLINIC | Age: 84
End: 2024-01-04
Payer: MEDICARE

## 2024-01-04 VITALS
SYSTOLIC BLOOD PRESSURE: 124 MMHG | OXYGEN SATURATION: 97 % | BODY MASS INDEX: 22.5 KG/M2 | WEIGHT: 127 LBS | HEART RATE: 102 BPM | DIASTOLIC BLOOD PRESSURE: 70 MMHG

## 2024-01-04 DIAGNOSIS — I12.9 TYPE 2 DM WITH CKD STAGE 3 AND HYPERTENSION (HCC): ICD-10-CM

## 2024-01-04 DIAGNOSIS — Z00.00 ENCOUNTER FOR ANNUAL WELLNESS VISIT (AWV) IN MEDICARE PATIENT: Primary | ICD-10-CM

## 2024-01-04 DIAGNOSIS — E11.65 UNCONTROLLED TYPE 2 DIABETES MELLITUS WITH HYPERGLYCEMIA (HCC): ICD-10-CM

## 2024-01-04 DIAGNOSIS — E11.22 TYPE 2 DM WITH CKD STAGE 3 AND HYPERTENSION (HCC): ICD-10-CM

## 2024-01-04 DIAGNOSIS — N18.32 STAGE 3B CHRONIC KIDNEY DISEASE (HCC): ICD-10-CM

## 2024-01-04 DIAGNOSIS — N18.30 TYPE 2 DM WITH CKD STAGE 3 AND HYPERTENSION (HCC): ICD-10-CM

## 2024-01-04 DIAGNOSIS — F32.1 MDD (MAJOR DEPRESSIVE DISORDER), SINGLE EPISODE, MODERATE (HCC): ICD-10-CM

## 2024-01-04 LAB — HBA1C MFR BLD: 9.1 %

## 2024-01-04 PROCEDURE — 1123F ACP DISCUSS/DSCN MKR DOCD: CPT | Performed by: FAMILY MEDICINE

## 2024-01-04 PROCEDURE — 3046F HEMOGLOBIN A1C LEVEL >9.0%: CPT | Performed by: FAMILY MEDICINE

## 2024-01-04 PROCEDURE — 3078F DIAST BP <80 MM HG: CPT | Performed by: FAMILY MEDICINE

## 2024-01-04 PROCEDURE — G0439 PPPS, SUBSEQ VISIT: HCPCS | Performed by: FAMILY MEDICINE

## 2024-01-04 PROCEDURE — 83036 HEMOGLOBIN GLYCOSYLATED A1C: CPT | Performed by: FAMILY MEDICINE

## 2024-01-04 PROCEDURE — 99214 OFFICE O/P EST MOD 30 MIN: CPT | Performed by: FAMILY MEDICINE

## 2024-01-04 PROCEDURE — 3074F SYST BP LT 130 MM HG: CPT | Performed by: FAMILY MEDICINE

## 2024-01-04 RX ORDER — SAXAGLIPTIN 2.5 MG/1
2.5 TABLET, FILM COATED ORAL DAILY
Qty: 90 TABLET | Refills: 0 | Status: SHIPPED | OUTPATIENT
Start: 2024-01-04

## 2024-01-04 ASSESSMENT — PATIENT HEALTH QUESTIONNAIRE - PHQ9
3. TROUBLE FALLING OR STAYING ASLEEP: 1
5. POOR APPETITE OR OVEREATING: 1
SUM OF ALL RESPONSES TO PHQ QUESTIONS 1-9: 7
10. IF YOU CHECKED OFF ANY PROBLEMS, HOW DIFFICULT HAVE THESE PROBLEMS MADE IT FOR YOU TO DO YOUR WORK, TAKE CARE OF THINGS AT HOME, OR GET ALONG WITH OTHER PEOPLE: 0
8. MOVING OR SPEAKING SO SLOWLY THAT OTHER PEOPLE COULD HAVE NOTICED. OR THE OPPOSITE, BEING SO FIGETY OR RESTLESS THAT YOU HAVE BEEN MOVING AROUND A LOT MORE THAN USUAL: 0
SUM OF ALL RESPONSES TO PHQ QUESTIONS 1-9: 7
9. THOUGHTS THAT YOU WOULD BE BETTER OFF DEAD, OR OF HURTING YOURSELF: 0
4. FEELING TIRED OR HAVING LITTLE ENERGY: 2
2. FEELING DOWN, DEPRESSED OR HOPELESS: 1
6. FEELING BAD ABOUT YOURSELF - OR THAT YOU ARE A FAILURE OR HAVE LET YOURSELF OR YOUR FAMILY DOWN: 1
SUM OF ALL RESPONSES TO PHQ QUESTIONS 1-9: 7
SUM OF ALL RESPONSES TO PHQ9 QUESTIONS 1 & 2: 2
SUM OF ALL RESPONSES TO PHQ QUESTIONS 1-9: 7
1. LITTLE INTEREST OR PLEASURE IN DOING THINGS: 1
7. TROUBLE CONCENTRATING ON THINGS, SUCH AS READING THE NEWSPAPER OR WATCHING TELEVISION: 0

## 2024-01-04 ASSESSMENT — LIFESTYLE VARIABLES
HOW OFTEN DO YOU HAVE A DRINK CONTAINING ALCOHOL: 2-4 TIMES A MONTH
HOW MANY STANDARD DRINKS CONTAINING ALCOHOL DO YOU HAVE ON A TYPICAL DAY: 1 OR 2

## 2024-01-04 NOTE — PATIENT INSTRUCTIONS
day. Make sure you take aspirin and not another kind of pain reliever, such as acetaminophen (Tylenol).   When should you call for help?   Call 911 if you have symptoms of a heart attack. These may include:    Chest pain or pressure, or a strange feeling in the chest.     Sweating.     Shortness of breath.     Pain, pressure, or a strange feeling in the back, neck, jaw, or upper belly or in one or both shoulders or arms.     Lightheadedness or sudden weakness.     A fast or irregular heartbeat.   After you call 911, the  may tell you to chew 1 adult-strength or 2 to 4 low-dose aspirin. Wait for an ambulance. Do not try to drive yourself.  Watch closely for changes in your health, and be sure to contact your doctor if you have any problems.  Where can you learn more?  Go to https://www.[employer]/patientEd and enter F075 to learn more about \"A Healthy Heart: Care Instructions.\"  Current as of: June 25, 2023               Content Version: 13.9  © 2795-6824 [employer].   Care instructions adapted under license by [employer]. If you have questions about a medical condition or this instruction, always ask your healthcare professional. [employer] disclaims any warranty or liability for your use of this information.      Personalized Preventive Plan for Jada Crane - 1/4/2024  Medicare offers a range of preventive health benefits. Some of the tests and screenings are paid in full while other may be subject to a deductible, co-insurance, and/or copay.    Some of these benefits include a comprehensive review of your medical history including lifestyle, illnesses that may run in your family, and various assessments and screenings as appropriate.    After reviewing your medical record and screening and assessments performed today your provider may have ordered immunizations, labs, imaging, and/or referrals for you.  A list of these orders (if applicable) as well as your Preventive  "I am the same, nothing is going to work"

## 2024-01-04 NOTE — PROGRESS NOTES
Medicare Annual Wellness Visit    Jada Crane is here for Diabetes and Medicare AWV    Assessment & Plan     Jada was seen today for diabetes and medicare awv.    Diagnoses and all orders for this visit:    Encounter for annual wellness visit (AWV) in Medicare patient  Recommendations for Preventive Services Due: see orders and patient instructions/AVS.  Recommended screening schedule for the next 5-10 years is provided to the patient in written form: see Patient Instructions/AVS.  Uncontrolled type 2 diabetes mellitus with hyperglycemia (HCC)  -     POCT glycosylated hemoglobin (Hb A1C)  -     sAXagliptin (ONGLYZA) 2.5 MG TABS tablet; Take 1 tablet by mouth daily  Very uncontrolled and worsening today.   Start Onglyza per orders, renally dosed. Could not tolerate Januvia due to itching. Not a candidate for metformin due to CKD.   MDD (major depressive disorder), single episode, moderate (HCC)  Stable on paroxetine, will continue.   Type 2 DM with CKD stage 3 and hypertension (HCC)  Last GFR was improved at 50. Will recheck BUN, creatinine, and GFR at next visit in 3 months. HTN is well controlled today.   Stage 3b chronic kidney disease (HCC)  Stable. Will monitor at least yearly. Avoiding metformin for diabetes due to renal dysfunction. Choice of lower dose DPP4 made to help control glucose without risk to renal function.          Return in about 3 months (around 4/4/2024) for Diabetes.     Subjective   The following acute and/or chronic problems were also addressed today:  Diabetes Mellitus Type 2: Current symptoms/problems include  fatigue .    Medication compliance:   she is not currently on any medication for diabetes  Weight trend: stable  Current exercise: no regular exercise  Barriers: impairment:  fatigue    Home blood sugar records: patient does not test  Any episodes of hypoglycemia? no  Eye exam current (within one year): yes   reports that she has never smoked. She has never used smokeless

## 2024-02-02 ENCOUNTER — OFFICE VISIT (OUTPATIENT)
Dept: FAMILY MEDICINE CLINIC | Age: 84
End: 2024-02-02
Payer: MEDICARE

## 2024-02-02 VITALS
DIASTOLIC BLOOD PRESSURE: 70 MMHG | BODY MASS INDEX: 22.85 KG/M2 | OXYGEN SATURATION: 93 % | SYSTOLIC BLOOD PRESSURE: 132 MMHG | WEIGHT: 129 LBS | HEART RATE: 60 BPM | RESPIRATION RATE: 18 BRPM

## 2024-02-02 DIAGNOSIS — M77.8 TENDONITIS OF ELBOW, RIGHT: Primary | ICD-10-CM

## 2024-02-02 PROCEDURE — 99213 OFFICE O/P EST LOW 20 MIN: CPT | Performed by: NURSE PRACTITIONER

## 2024-02-02 PROCEDURE — 3078F DIAST BP <80 MM HG: CPT | Performed by: NURSE PRACTITIONER

## 2024-02-02 PROCEDURE — 3075F SYST BP GE 130 - 139MM HG: CPT | Performed by: NURSE PRACTITIONER

## 2024-02-02 PROCEDURE — 1123F ACP DISCUSS/DSCN MKR DOCD: CPT | Performed by: NURSE PRACTITIONER

## 2024-02-02 RX ORDER — NABUMETONE 500 MG/1
500 TABLET, FILM COATED ORAL 2 TIMES DAILY
Qty: 30 TABLET | Refills: 0 | Status: SHIPPED | OUTPATIENT
Start: 2024-02-02

## 2024-02-02 NOTE — PROGRESS NOTES
Jada Crane (:  1940) is a 83 y.o. female,Established patient, here for evaluation of the following chief complaint(s):  Shoulder Pain (2 weeks R arm)         ASSESSMENT/PLAN:  1. Tendonitis of elbow, right  -     nabumetone (RELAFEN) 500 MG tablet; Take 1 tablet by mouth 2 times daily, Disp-30 tablet, R-0Normal    Apply ice pack 4 times daily for 15-20 minutes. Wrap in towel, etc to avoid the coldness directly to the skin.    If fails to improve will refer to ortho    Discussed felt to be related to tendon and not bone. Xray not likely to be revealing of the cause     Avoid repetitive lifting motion for 2 weeks      No follow-ups on file.         Subjective   SUBJECTIVE/OBJECTIVE:  HPI    Hx of arthritis right hand. Has had pain for the past few months. Using voltaren gel. Now with pain lower arm. Pain to upper arm and shoulder when picking up items for the past 2-3 weeks. Last night elbow to upper arm painful when trying to sleep. Feels it is movement of the arm rather than picking up items. No OTC tx.     Had a fall 11 months ago but this type of pain started well after that.  is concerned she will need an xray of the arm.       Review of Systems   All other systems reviewed and are negative.         Objective   Physical Exam  Constitutional:       Appearance: Normal appearance. She is well-developed.   HENT:      Head: Normocephalic and atraumatic.   Neck:      Thyroid: No thyromegaly.   Cardiovascular:      Rate and Rhythm: Normal rate and regular rhythm.   Pulmonary:      Effort: Pulmonary effort is normal.      Breath sounds: Normal breath sounds.   Abdominal:      General: Bowel sounds are normal.      Palpations: Abdomen is soft.   Musculoskeletal:         General: Tenderness present. No swelling or deformity. Normal range of motion.      Cervical back: Normal range of motion and neck supple.      Comments: Tenderness noted lateral epicondyle to palpation. Neg for redness, warmth.

## 2024-03-08 DIAGNOSIS — E78.2 MIXED HYPERLIPIDEMIA: Primary | ICD-10-CM

## 2024-03-08 RX ORDER — PAROXETINE HYDROCHLORIDE 20 MG/1
20 TABLET, FILM COATED ORAL DAILY
Qty: 90 TABLET | Refills: 1 | Status: SHIPPED | OUTPATIENT
Start: 2024-03-08

## 2024-03-08 RX ORDER — LOSARTAN POTASSIUM 100 MG/1
100 TABLET ORAL DAILY
Qty: 90 TABLET | Refills: 1 | Status: SHIPPED | OUTPATIENT
Start: 2024-03-08

## 2024-03-08 NOTE — TELEPHONE ENCOUNTER
Refill Request     CONFIRM preferred pharmacy with the patient.    If Mail Order Rx - Pend for 90 day refill.      Last Seen: Last Seen Department: 2/2/2024  Last Seen by PCP: 1/4/2024    Last Written:   Paxil-09/14/2023 90 tab 1 refills   Losartan-09/14/2023 90 tab 1 refills     If no future appointment scheduled:  Review the last OV with PCP and review information for follow-up visit,  Route STAFF MESSAGE with patient name to the  Pool for scheduling with the following information:            -  Timing of next visit           -  Visit type ie Physical, OV, etc           -  Diagnoses/Reason ie. COPD, HTN - Do not use MEDICATION, Follow-up or CHECK UP - Give reason for visit      Next Appointment:   Future Appointments   Date Time Provider Department Center   3/18/2024 11:00 AM [facility], VASCULAR LAB ROOM [address] [place], [place] [id number]   4/4/2024  2:00 PM Lynda Blank MD [facility] [place] [facility]       Message sent to  to schedule appt with patient?  NO      Requested Prescriptions     Pending Prescriptions Disp Refills    PARoxetine (PAXIL) 20 MG tablet [Pharmacy Med Name: PARoxetine HCL 20 MG TABLET] 90 tablet 1     Sig: TAKE 1 TABLET BY MOUTH DAILY    losartan (COZAAR) 100 MG tablet [Pharmacy Med Name: LOSARTAN POTASSIUM 100 MG TAB] 90 tablet 1     Sig: TAKE 1 TABLET BY MOUTH DAILY

## 2024-03-09 NOTE — TELEPHONE ENCOUNTER
Refill Request     CONFIRM preferred pharmacy with the patient.    If Mail Order Rx - Pend for 90 day refill.      Last Seen: Last Seen Department: 2/2/2024  Last Seen by PCP: 1/4/2024    Last Written: 3/20/2023    If no future appointment scheduled:  Review the last OV with PCP and review information for follow-up visit,  Route STAFF MESSAGE with patient name to the  Pool for scheduling with the following information:            -  Timing of next visit           -  Visit type ie Physical, OV, etc           -  Diagnoses/Reason ie. COPD, HTN - Do not use MEDICATION, Follow-up or CHECK UP - Give reason for visit      Next Appointment:   Future Appointments   Date Time Provider Department Center   3/18/2024 11:00 AM [facility]   4/4/2024  2:00 PM Lynda Blank MD [facility] [place] [facility] [place]       Message sent to  to schedule appt with patient?  NO      Requested Prescriptions     Pending Prescriptions Disp Refills    amLODIPine (NORVASC) 10 MG tablet [Pharmacy Med Name: amLODIPine BESYLATE 10 MG TAB] 90 tablet 3     Sig: TAKE ONE TABLET BY MOUTH DAILY       
Inability to Tolerate Liquids or Foods/Pain not relieved by Medications/Persistent Nausea and Vomiting/Bleeding that does not stop/GYN Fever>100.4

## 2024-03-11 DIAGNOSIS — E78.2 MIXED HYPERLIPIDEMIA: ICD-10-CM

## 2024-03-11 RX ORDER — AMLODIPINE BESYLATE 10 MG/1
TABLET ORAL
Qty: 90 TABLET | Refills: 3 | Status: SHIPPED | OUTPATIENT
Start: 2024-03-11

## 2024-03-12 DIAGNOSIS — F51.04 CHRONIC INSOMNIA: ICD-10-CM

## 2024-03-12 RX ORDER — QUETIAPINE FUMARATE 50 MG/1
25 TABLET, FILM COATED ORAL NIGHTLY
Qty: 90 TABLET | Refills: 0 | Status: SHIPPED | OUTPATIENT
Start: 2024-03-12

## 2024-03-12 NOTE — TELEPHONE ENCOUNTER
Refill Request     CONFIRM preferred pharmacy with the patient.    If Mail Order Rx - Pend for 90 day refill.      Last Seen: Last Seen Department: 2/2/2024  Last Seen by PCP: 1/4/2024    Last Written: 12/12/2023 90 tab 0 refills     If no future appointment scheduled:  Review the last OV with PCP and review information for follow-up visit,  Route STAFF MESSAGE with patient name to the  Pool for scheduling with the following information:            -  Timing of next visit           -  Visit type ie Physical, OV, etc           -  Diagnoses/Reason ie. COPD, HTN - Do not use MEDICATION, Follow-up or CHECK UP - Give reason for visit      Next Appointment:   Future Appointments   Date Time Provider Department Center   3/18/2024 11:00 AM [facility]   4/4/2024  2:00 PM Lynda Blank MD [facility] [place] [facility] [place]       Message sent to  to schedule appt with patient?  NO      Requested Prescriptions     Pending Prescriptions Disp Refills    QUEtiapine (SEROQUEL) 50 MG tablet 90 tablet 0     Sig: Take 0.5 tablets by mouth at bedtime

## 2024-03-13 RX ORDER — FENOFIBRATE 160 MG/1
TABLET ORAL
Qty: 90 TABLET | Refills: 3 | Status: SHIPPED | OUTPATIENT
Start: 2024-03-13

## 2024-03-18 ENCOUNTER — HOSPITAL ENCOUNTER (OUTPATIENT)
Dept: VASCULAR LAB | Age: 84
Discharge: HOME | End: 2024-03-18
Payer: MEDICARE

## 2024-03-18 DIAGNOSIS — I65.23 BILATERAL CAROTID ARTERY STENOSIS: ICD-10-CM

## 2024-03-18 PROCEDURE — 93880 EXTRACRANIAL BILAT STUDY: CPT

## 2024-03-20 ENCOUNTER — TELEPHONE (OUTPATIENT)
Dept: CARDIOLOGY CLINIC | Age: 84
End: 2024-03-20

## 2024-03-20 NOTE — TELEPHONE ENCOUNTER
----- Message from Chalo Newton MD sent at 3/20/2024 10:09 AM EDT -----  Carotid doppler with left < 50% blockage and right 50-69% blockage (no change 2/22 study). Good news and no intervention needed until > 80% blockage. cpm

## 2024-03-26 ENCOUNTER — OFFICE VISIT (OUTPATIENT)
Dept: FAMILY MEDICINE CLINIC | Age: 84
End: 2024-03-26
Payer: MEDICARE

## 2024-03-26 ENCOUNTER — TELEPHONE (OUTPATIENT)
Dept: FAMILY MEDICINE CLINIC | Age: 84
End: 2024-03-26

## 2024-03-26 VITALS
OXYGEN SATURATION: 97 % | DIASTOLIC BLOOD PRESSURE: 64 MMHG | WEIGHT: 131 LBS | BODY MASS INDEX: 23.21 KG/M2 | HEIGHT: 63 IN | HEART RATE: 63 BPM | TEMPERATURE: 98.1 F | SYSTOLIC BLOOD PRESSURE: 134 MMHG

## 2024-03-26 DIAGNOSIS — Z01.818 PRE-OP EXAMINATION: ICD-10-CM

## 2024-03-26 DIAGNOSIS — N18.30 TYPE 2 DM WITH CKD STAGE 3 AND HYPERTENSION (HCC): ICD-10-CM

## 2024-03-26 DIAGNOSIS — I12.9 TYPE 2 DM WITH CKD STAGE 3 AND HYPERTENSION (HCC): ICD-10-CM

## 2024-03-26 DIAGNOSIS — H25.9 AGE-RELATED CATARACT OF BOTH EYES, UNSPECIFIED AGE-RELATED CATARACT TYPE: Primary | ICD-10-CM

## 2024-03-26 DIAGNOSIS — E11.22 TYPE 2 DM WITH CKD STAGE 3 AND HYPERTENSION (HCC): ICD-10-CM

## 2024-03-26 PROCEDURE — 3046F HEMOGLOBIN A1C LEVEL >9.0%: CPT | Performed by: STUDENT IN AN ORGANIZED HEALTH CARE EDUCATION/TRAINING PROGRAM

## 2024-03-26 PROCEDURE — 99214 OFFICE O/P EST MOD 30 MIN: CPT | Performed by: STUDENT IN AN ORGANIZED HEALTH CARE EDUCATION/TRAINING PROGRAM

## 2024-03-26 PROCEDURE — 3075F SYST BP GE 130 - 139MM HG: CPT | Performed by: STUDENT IN AN ORGANIZED HEALTH CARE EDUCATION/TRAINING PROGRAM

## 2024-03-26 PROCEDURE — 3078F DIAST BP <80 MM HG: CPT | Performed by: STUDENT IN AN ORGANIZED HEALTH CARE EDUCATION/TRAINING PROGRAM

## 2024-03-26 PROCEDURE — 1123F ACP DISCUSS/DSCN MKR DOCD: CPT | Performed by: STUDENT IN AN ORGANIZED HEALTH CARE EDUCATION/TRAINING PROGRAM

## 2024-03-26 NOTE — PROGRESS NOTES
Invasive hemodynamic monitoring perioperatively: not indicated   5. Deep vein thrombosis prophylaxis postoperatively:regimen to be chosen by surgical team   6. Other measures: none      1. Age-related cataract of both eyes, unspecified age-related cataract type  2. Pre-op examination  Plan for procedure as above    3. Type 2 DM with CKD stage 3 and hypertension (HCC)  Blood sugar currently uncontrolled with most recent A1c 9.1.  Encourage follow-up with PCP for medication management.  Will check to see if she has started Onglyza at home although unsure.  Recommend starting medication.          While assessing care for this patient, I have reviewed all pertinent lab work/imaging/ specialist notes and care in reference to those problems addressed above in detail. Appropriate medical decision making was based on this.     Please note that portions of this note may have been completed with a voice recognition program. Efforts were made to edit the dictations but occasionally words are mis-transcribed.      Pt is at an acceptable risk for planned procedure    Please call with any questions  Jt Joe, DO

## 2024-03-26 NOTE — TELEPHONE ENCOUNTER
Spoke to Dr. Joe and he stated that he didn't want patient to see Dr. Blank before her surgery but to see Dr. Blank before her appt in July to follow up on her DM. I spoke to patient and informed her to call the office back to schedule an appt with Dr. Blank for her DM.     Dr. Blank doesn't have any opening till July. Can patient be squeezed in one day? Please Advise

## 2024-03-26 NOTE — TELEPHONE ENCOUNTER
Please advise as Dr. Blank is out this week pt is aware when her pre op appt was scheduled. Pt is scheduled in July

## 2024-03-26 NOTE — TELEPHONE ENCOUNTER
Says she was recommended to see PCP before her surgery April 2nd. Did have Pre-op with Dr. Joe 3/26/2024. Please advise if she needs to be seen again and when there is availability.

## 2024-04-23 ENCOUNTER — OFFICE VISIT (OUTPATIENT)
Dept: FAMILY MEDICINE CLINIC | Age: 84
End: 2024-04-23
Payer: MEDICARE

## 2024-04-23 VITALS
BODY MASS INDEX: 23.32 KG/M2 | OXYGEN SATURATION: 98 % | HEIGHT: 63 IN | SYSTOLIC BLOOD PRESSURE: 118 MMHG | HEART RATE: 56 BPM | WEIGHT: 131.6 LBS | TEMPERATURE: 97 F | DIASTOLIC BLOOD PRESSURE: 58 MMHG

## 2024-04-23 DIAGNOSIS — N18.30 TYPE 2 DM WITH CKD STAGE 3 AND HYPERTENSION (HCC): ICD-10-CM

## 2024-04-23 DIAGNOSIS — E83.42 HYPOMAGNESEMIA: ICD-10-CM

## 2024-04-23 DIAGNOSIS — N18.32 STAGE 3B CHRONIC KIDNEY DISEASE (HCC): ICD-10-CM

## 2024-04-23 DIAGNOSIS — H91.93 BILATERAL HEARING LOSS, UNSPECIFIED HEARING LOSS TYPE: ICD-10-CM

## 2024-04-23 DIAGNOSIS — E11.69 DM TYPE 2 WITH DIABETIC DYSLIPIDEMIA (HCC): ICD-10-CM

## 2024-04-23 DIAGNOSIS — E78.5 DM TYPE 2 WITH DIABETIC DYSLIPIDEMIA (HCC): ICD-10-CM

## 2024-04-23 DIAGNOSIS — E11.22 TYPE 2 DM WITH CKD STAGE 3 AND HYPERTENSION (HCC): ICD-10-CM

## 2024-04-23 DIAGNOSIS — I12.9 TYPE 2 DM WITH CKD STAGE 3 AND HYPERTENSION (HCC): ICD-10-CM

## 2024-04-23 DIAGNOSIS — F51.04 CHRONIC INSOMNIA: ICD-10-CM

## 2024-04-23 DIAGNOSIS — E11.65 UNCONTROLLED TYPE 2 DIABETES MELLITUS WITH HYPERGLYCEMIA (HCC): Primary | ICD-10-CM

## 2024-04-23 DIAGNOSIS — I10 PRIMARY HYPERTENSION: ICD-10-CM

## 2024-04-23 LAB
CREATININE URINE POCT: 100
HBA1C MFR BLD: 8.5 %
MICROALBUMIN/CREAT 24H UR: 30 MG/G{CREAT}
MICROALBUMIN/CREAT UR-RTO: <30

## 2024-04-23 PROCEDURE — 83036 HEMOGLOBIN GLYCOSYLATED A1C: CPT | Performed by: FAMILY MEDICINE

## 2024-04-23 PROCEDURE — 99214 OFFICE O/P EST MOD 30 MIN: CPT | Performed by: FAMILY MEDICINE

## 2024-04-23 PROCEDURE — 82044 UR ALBUMIN SEMIQUANTITATIVE: CPT | Performed by: FAMILY MEDICINE

## 2024-04-23 PROCEDURE — 1123F ACP DISCUSS/DSCN MKR DOCD: CPT | Performed by: FAMILY MEDICINE

## 2024-04-23 PROCEDURE — 3078F DIAST BP <80 MM HG: CPT | Performed by: FAMILY MEDICINE

## 2024-04-23 PROCEDURE — 3052F HG A1C>EQUAL 8.0%<EQUAL 9.0%: CPT | Performed by: FAMILY MEDICINE

## 2024-04-23 PROCEDURE — 3074F SYST BP LT 130 MM HG: CPT | Performed by: FAMILY MEDICINE

## 2024-04-23 RX ORDER — QUETIAPINE FUMARATE 25 MG/1
25 TABLET, FILM COATED ORAL NIGHTLY
Qty: 90 TABLET | Refills: 1 | Status: SHIPPED | OUTPATIENT
Start: 2024-04-23

## 2024-04-23 RX ORDER — ORAL SEMAGLUTIDE 3 MG/1
3 TABLET ORAL DAILY
Qty: 30 TABLET | Refills: 0 | Status: SHIPPED | OUTPATIENT
Start: 2024-04-23

## 2024-04-23 SDOH — ECONOMIC STABILITY: FOOD INSECURITY: WITHIN THE PAST 12 MONTHS, THE FOOD YOU BOUGHT JUST DIDN'T LAST AND YOU DIDN'T HAVE MONEY TO GET MORE.: NEVER TRUE

## 2024-04-23 SDOH — ECONOMIC STABILITY: FOOD INSECURITY: WITHIN THE PAST 12 MONTHS, YOU WORRIED THAT YOUR FOOD WOULD RUN OUT BEFORE YOU GOT MONEY TO BUY MORE.: NEVER TRUE

## 2024-04-23 SDOH — ECONOMIC STABILITY: INCOME INSECURITY: HOW HARD IS IT FOR YOU TO PAY FOR THE VERY BASICS LIKE FOOD, HOUSING, MEDICAL CARE, AND HEATING?: NOT HARD AT ALL

## 2024-04-23 ASSESSMENT — PATIENT HEALTH QUESTIONNAIRE - PHQ9
SUM OF ALL RESPONSES TO PHQ QUESTIONS 1-9: 0
4. FEELING TIRED OR HAVING LITTLE ENERGY: NOT AT ALL
SUM OF ALL RESPONSES TO PHQ QUESTIONS 1-9: 0
SUM OF ALL RESPONSES TO PHQ QUESTIONS 1-9: 0
9. THOUGHTS THAT YOU WOULD BE BETTER OFF DEAD, OR OF HURTING YOURSELF: NOT AT ALL
1. LITTLE INTEREST OR PLEASURE IN DOING THINGS: NOT AT ALL
SUM OF ALL RESPONSES TO PHQ QUESTIONS 1-9: 0
3. TROUBLE FALLING OR STAYING ASLEEP: NOT AT ALL
5. POOR APPETITE OR OVEREATING: NOT AT ALL
6. FEELING BAD ABOUT YOURSELF - OR THAT YOU ARE A FAILURE OR HAVE LET YOURSELF OR YOUR FAMILY DOWN: NOT AT ALL
SUM OF ALL RESPONSES TO PHQ9 QUESTIONS 1 & 2: 0
10. IF YOU CHECKED OFF ANY PROBLEMS, HOW DIFFICULT HAVE THESE PROBLEMS MADE IT FOR YOU TO DO YOUR WORK, TAKE CARE OF THINGS AT HOME, OR GET ALONG WITH OTHER PEOPLE: NOT DIFFICULT AT ALL
8. MOVING OR SPEAKING SO SLOWLY THAT OTHER PEOPLE COULD HAVE NOTICED. OR THE OPPOSITE, BEING SO FIGETY OR RESTLESS THAT YOU HAVE BEEN MOVING AROUND A LOT MORE THAN USUAL: NOT AT ALL
2. FEELING DOWN, DEPRESSED OR HOPELESS: NOT AT ALL
7. TROUBLE CONCENTRATING ON THINGS, SUCH AS READING THE NEWSPAPER OR WATCHING TELEVISION: NOT AT ALL

## 2024-04-23 ASSESSMENT — ANXIETY QUESTIONNAIRES
1. FEELING NERVOUS, ANXIOUS, OR ON EDGE: NOT AT ALL
2. NOT BEING ABLE TO STOP OR CONTROL WORRYING: NOT AT ALL
GAD7 TOTAL SCORE: 0
7. FEELING AFRAID AS IF SOMETHING AWFUL MIGHT HAPPEN: NOT AT ALL
4. TROUBLE RELAXING: NOT AT ALL
3. WORRYING TOO MUCH ABOUT DIFFERENT THINGS: NOT AT ALL
5. BEING SO RESTLESS THAT IT IS HARD TO SIT STILL: NOT AT ALL
6. BECOMING EASILY ANNOYED OR IRRITABLE: NOT AT ALL
IF YOU CHECKED OFF ANY PROBLEMS ON THIS QUESTIONNAIRE, HOW DIFFICULT HAVE THESE PROBLEMS MADE IT FOR YOU TO DO YOUR WORK, TAKE CARE OF THINGS AT HOME, OR GET ALONG WITH OTHER PEOPLE: NOT DIFFICULT AT ALL

## 2024-04-25 NOTE — PROGRESS NOTES
Jada Crane (:  1940) is a 84 y.o. female,Established patient, here for evaluation of the following chief complaint(s):  Diabetes      Assessment & Plan   1. Uncontrolled type 2 diabetes mellitus with hyperglycemia (HCC)  -     Semaglutide (RYBELSUS) 3 MG TABS; Take 3 mg by mouth daily, Disp-30 tablet, R-0Normal  -     CBC with Auto Differential; Future  -     Comprehensive Metabolic Panel; Future  -     Lipid Panel; Future  -     TSH with Reflex; Future  -     Magnesium; Future  Will get her started on Rybelsus. She cannot take several DM medications due to CKD. She is due for yearly labs for monitoring as well, which have been ordered. Recheck hemoglobin A1C in 3 months.   2. Type 2 DM with CKD stage 3 and hypertension (HCC)  -     POCT glycosylated hemoglobin (Hb A1C)  -     POCT microalbumin  -     Semaglutide (RYBELSUS) 3 MG TABS; Take 3 mg by mouth daily, Disp-30 tablet, R-0Normal  -     CBC with Auto Differential; Future  -     Comprehensive Metabolic Panel; Future  -     Lipid Panel; Future  -     TSH with Reflex; Future  -     Magnesium; Future  HTN is controlled,  CKD was stable on last check over a year ago. Discussed it is time to monitor again. Blood work ordered. Begin Rybelsus for uncontrolled DM. Goal for hemoglobin A1C is 7.5% or less given her age.   3. Stage 3b chronic kidney disease (HCC)  -     Semaglutide (RYBELSUS) 3 MG TABS; Take 3 mg by mouth daily, Disp-30 tablet, R-0Normal  -     CBC with Auto Differential; Future  -     Comprehensive Metabolic Panel; Future  -     Lipid Panel; Future  -     TSH with Reflex; Future  -     Magnesium; Future  Will get her started on Rybelsus. She cannot take several DM medications due to CKD. She is due for yearly labs for monitoring as well, especially renal function, which have been ordered. Recheck hemoglobin A1C in 3 months.   4. Primary hypertension  -     CBC with Auto Differential; Future  -     Comprehensive Metabolic Panel; Future  -

## 2024-04-29 ENCOUNTER — TELEPHONE (OUTPATIENT)
Dept: FAMILY MEDICINE CLINIC | Age: 84
End: 2024-04-29

## 2024-04-29 RX ORDER — ALENDRONATE SODIUM 70 MG/1
TABLET ORAL
Qty: 12 TABLET | Refills: 3 | Status: SHIPPED | OUTPATIENT
Start: 2024-04-29

## 2024-04-29 NOTE — TELEPHONE ENCOUNTER
Refill Request     CONFIRM preferred pharmacy with the patient.    If Mail Order Rx - Pend for 90 day refill.      Last Seen: Last Seen Department: 4/23/2024  Last Seen by PCP: 4/23/2024    Last Written: 10/18/23 12 with 1 refill     If no future appointment scheduled:  Review the last OV with PCP and review information for follow-up visit,  Route STAFF MESSAGE with patient name to the  Pool for scheduling with the following information:            -  Timing of next visit           -  Visit type ie Physical, OV, etc           -  Diagnoses/Reason ie. COPD, HTN - Do not use MEDICATION, Follow-up or CHECK UP - Give reason for visit      Next Appointment:   Future Appointments   Date Time Provider Department Center   6/4/2024  2:30 PM Chalo Newton MD [facility]   7/23/2024  1:30 PM Lynda Blank MD [facility] [place] [facility] [place]       Message sent to  to schedule appt with patient?  NO      Requested Prescriptions     Pending Prescriptions Disp Refills    alendronate (FOSAMAX) 70 MG tablet [Pharmacy Med Name: ALENDRONATE SODIUM 70 MG TAB] 12 tablet 1     Sig: TAKE 1 TABLET BY MOUTH ONCE WEEKLY ON AN EMPTY STOMACH BEFORE BREAKFAST. REMAIN UPRIGHT FOR 30 MINUTES AND TAKE WITH 8 OUNCES OF WATER

## 2024-04-29 NOTE — TELEPHONE ENCOUNTER
Patient calling in stating you started her on rybelsus  and she she like blah and is very fatigued. States she an not sleep either. She only took it for 2 days has not taking it today. Also reports a lot of indigestion as well. Please advise

## 2024-05-06 ENCOUNTER — OFFICE VISIT (OUTPATIENT)
Dept: FAMILY MEDICINE CLINIC | Age: 84
End: 2024-05-06
Payer: MEDICARE

## 2024-05-06 VITALS
WEIGHT: 128 LBS | BODY MASS INDEX: 22.68 KG/M2 | SYSTOLIC BLOOD PRESSURE: 100 MMHG | HEIGHT: 63 IN | DIASTOLIC BLOOD PRESSURE: 60 MMHG | TEMPERATURE: 97.6 F | HEART RATE: 66 BPM

## 2024-05-06 DIAGNOSIS — I12.9 TYPE 2 DM WITH CKD STAGE 3 AND HYPERTENSION (HCC): Primary | ICD-10-CM

## 2024-05-06 DIAGNOSIS — D64.9 ANEMIA, UNSPECIFIED TYPE: ICD-10-CM

## 2024-05-06 DIAGNOSIS — N18.30 TYPE 2 DM WITH CKD STAGE 3 AND HYPERTENSION (HCC): Primary | ICD-10-CM

## 2024-05-06 DIAGNOSIS — R53.83 FATIGUE, UNSPECIFIED TYPE: ICD-10-CM

## 2024-05-06 DIAGNOSIS — I25.10 CORONARY ARTERY DISEASE INVOLVING NATIVE CORONARY ARTERY OF NATIVE HEART WITHOUT ANGINA PECTORIS: ICD-10-CM

## 2024-05-06 DIAGNOSIS — E11.22 TYPE 2 DM WITH CKD STAGE 3 AND HYPERTENSION (HCC): Primary | ICD-10-CM

## 2024-05-06 DIAGNOSIS — M85.852 OSTEOPENIA OF NECK OF LEFT FEMUR: ICD-10-CM

## 2024-05-06 DIAGNOSIS — I10 PRIMARY HYPERTENSION: ICD-10-CM

## 2024-05-06 PROCEDURE — 99214 OFFICE O/P EST MOD 30 MIN: CPT | Performed by: NURSE PRACTITIONER

## 2024-05-06 PROCEDURE — 3052F HG A1C>EQUAL 8.0%<EQUAL 9.0%: CPT | Performed by: NURSE PRACTITIONER

## 2024-05-06 PROCEDURE — 3078F DIAST BP <80 MM HG: CPT | Performed by: NURSE PRACTITIONER

## 2024-05-06 PROCEDURE — 3074F SYST BP LT 130 MM HG: CPT | Performed by: NURSE PRACTITIONER

## 2024-05-06 PROCEDURE — 1123F ACP DISCUSS/DSCN MKR DOCD: CPT | Performed by: NURSE PRACTITIONER

## 2024-05-06 RX ORDER — BROMFENAC 0.76 MG/ML
SOLUTION/ DROPS OPHTHALMIC
COMMUNITY
Start: 2024-03-20

## 2024-05-06 RX ORDER — OFLOXACIN 3 MG/ML
SOLUTION/ DROPS OPHTHALMIC
COMMUNITY
Start: 2024-03-20

## 2024-05-06 SDOH — ECONOMIC STABILITY: FOOD INSECURITY: WITHIN THE PAST 12 MONTHS, YOU WORRIED THAT YOUR FOOD WOULD RUN OUT BEFORE YOU GOT MONEY TO BUY MORE.: NEVER TRUE

## 2024-05-06 SDOH — ECONOMIC STABILITY: INCOME INSECURITY: HOW HARD IS IT FOR YOU TO PAY FOR THE VERY BASICS LIKE FOOD, HOUSING, MEDICAL CARE, AND HEATING?: NOT HARD AT ALL

## 2024-05-06 ASSESSMENT — ENCOUNTER SYMPTOMS
NAUSEA: 0
VOMITING: 0
COLOR CHANGE: 0
RHINORRHEA: 0
ABDOMINAL DISTENTION: 0
SORE THROAT: 0
COUGH: 0
ABDOMINAL PAIN: 0
RESPIRATORY NEGATIVE: 1
SHORTNESS OF BREATH: 0
DIARRHEA: 0
TROUBLE SWALLOWING: 0
CHEST TIGHTNESS: 0
GASTROINTESTINAL NEGATIVE: 1
CONSTIPATION: 0
SINUS PRESSURE: 0

## 2024-05-06 ASSESSMENT — PATIENT HEALTH QUESTIONNAIRE - PHQ9: DEPRESSION UNABLE TO ASSESS: PT REFUSES

## 2024-05-06 NOTE — PROGRESS NOTES
[facility]  2024    Jada Crane (:  1940) is a 84 y.o. female  Established patient   Chief Complaint   Patient presents with    Nausea     Feeling blah   States she is just not feeling well in general     brain fog     Patient states she just feels foggy         ASSESSMENT/ PLAN  1. Type 2 DM with CKD stage 3 and hypertension (HCC)  - - Placed on Rybelsus on 24 for hgb A1c of 8.5 down from 9.1 on 24  - Switched to linagliptin on 24 due to the rybelsus causing worsening indigestion  - uncontrolled DM- Goal for hemoglobin A1C is 7.5% or less given her age.   - F/u to recheck hgb A1c in 3 months   - Recommended that pt get blood work completed that was ordered from 24: CBC, Mg, CMP, lipid, TSH    2. Primary hypertension  - HTN well controlled- Continue taking amlodipine, losartan, and metoprolol   - Recommended lifestyle modifications, such as healthy eating (decreasing sodium intake to less than 3019-0817 mg/day) and exercise three times a week as tolerated    -Recommended that pt get blood work completed that was ordered from 24: CBC, Mg, CMP, lipid, TSH    3. Fatigue, unspecified type  - Vitamin D 25 Hydroxy; Future  - Iron and TIBC; Future  - Ferritin; Future  - Will check iron studies due to low RBC, hemoglobin and hematocrit results in 2023  - Recommended that pt get the blood work completed that was ordered from 24 (CBC, Mg, CMP, lipid, and TSH) in addition to labs ordered today  - postop cataract surgery: right eye 24 and left eye 24: no acute complications  - May need to consider sleep apnea workup in the future- since pt is getting a sufficient amount of sleep during the night but is still napping during the day     4. Coronary artery disease involving native coronary artery of native heart without angina pectoris  - Followed by cardiology  -ECHO in : EF 62%  -Dr. Corona left heart cath on 2021- recommended medical management

## 2024-05-07 DIAGNOSIS — E11.65 UNCONTROLLED TYPE 2 DIABETES MELLITUS WITH HYPERGLYCEMIA (HCC): ICD-10-CM

## 2024-05-07 DIAGNOSIS — E83.42 HYPOMAGNESEMIA: ICD-10-CM

## 2024-05-07 DIAGNOSIS — N18.30 TYPE 2 DM WITH CKD STAGE 3 AND HYPERTENSION (HCC): ICD-10-CM

## 2024-05-07 DIAGNOSIS — E11.69 DM TYPE 2 WITH DIABETIC DYSLIPIDEMIA (HCC): ICD-10-CM

## 2024-05-07 DIAGNOSIS — R53.83 FATIGUE, UNSPECIFIED TYPE: ICD-10-CM

## 2024-05-07 DIAGNOSIS — E11.22 TYPE 2 DM WITH CKD STAGE 3 AND HYPERTENSION (HCC): ICD-10-CM

## 2024-05-07 DIAGNOSIS — E78.5 DM TYPE 2 WITH DIABETIC DYSLIPIDEMIA (HCC): ICD-10-CM

## 2024-05-07 DIAGNOSIS — N18.32 STAGE 3B CHRONIC KIDNEY DISEASE (HCC): ICD-10-CM

## 2024-05-07 DIAGNOSIS — F51.04 CHRONIC INSOMNIA: ICD-10-CM

## 2024-05-07 DIAGNOSIS — I10 PRIMARY HYPERTENSION: ICD-10-CM

## 2024-05-07 DIAGNOSIS — I12.9 TYPE 2 DM WITH CKD STAGE 3 AND HYPERTENSION (HCC): ICD-10-CM

## 2024-05-07 LAB
25(OH)D3 SERPL-MCNC: 30.5 NG/ML
ALBUMIN SERPL-MCNC: 4.4 G/DL (ref 3.4–5)
ALBUMIN/GLOB SERPL: 2.1 {RATIO} (ref 1.1–2.2)
ALP SERPL-CCNC: 64 U/L (ref 40–129)
ALT SERPL-CCNC: 23 U/L (ref 10–40)
ANION GAP SERPL CALCULATED.3IONS-SCNC: 12 MMOL/L (ref 3–16)
AST SERPL-CCNC: 28 U/L (ref 15–37)
BASOPHILS # BLD: 0.1 K/UL (ref 0–0.2)
BASOPHILS NFR BLD: 1.4 %
BILIRUB SERPL-MCNC: 0.3 MG/DL (ref 0–1)
BUN SERPL-MCNC: 25 MG/DL (ref 7–20)
CALCIUM SERPL-MCNC: 10 MG/DL (ref 8.3–10.6)
CHLORIDE SERPL-SCNC: 104 MMOL/L (ref 99–110)
CHOLEST SERPL-MCNC: 130 MG/DL (ref 0–199)
CO2 SERPL-SCNC: 27 MMOL/L (ref 21–32)
CREAT SERPL-MCNC: 1 MG/DL (ref 0.6–1.2)
DEPRECATED RDW RBC AUTO: 14.3 % (ref 12.4–15.4)
EOSINOPHIL # BLD: 0.2 K/UL (ref 0–0.6)
EOSINOPHIL NFR BLD: 3.9 %
FERRITIN SERPL IA-MCNC: 286.6 NG/ML (ref 15–150)
GFR SERPLBLD CREATININE-BSD FMLA CKD-EPI: 56 ML/MIN/{1.73_M2}
GLUCOSE SERPL-MCNC: 156 MG/DL (ref 70–99)
HCT VFR BLD AUTO: 40.6 % (ref 36–48)
HDLC SERPL-MCNC: 30 MG/DL (ref 40–60)
HGB BLD-MCNC: 13.9 G/DL (ref 12–16)
IRON SATN MFR SERPL: 21 % (ref 15–50)
IRON SERPL-MCNC: 80 UG/DL (ref 37–145)
LDLC SERPL CALC-MCNC: 53 MG/DL
LYMPHOCYTES # BLD: 1.8 K/UL (ref 1–5.1)
LYMPHOCYTES NFR BLD: 39.7 %
MAGNESIUM SERPL-MCNC: 1.2 MG/DL (ref 1.8–2.4)
MCH RBC QN AUTO: 30.2 PG (ref 26–34)
MCHC RBC AUTO-ENTMCNC: 34.3 G/DL (ref 31–36)
MCV RBC AUTO: 88.2 FL (ref 80–100)
MONOCYTES # BLD: 0.3 K/UL (ref 0–1.3)
MONOCYTES NFR BLD: 7.1 %
NEUTROPHILS # BLD: 2.1 K/UL (ref 1.7–7.7)
NEUTROPHILS NFR BLD: 47.9 %
PLATELET # BLD AUTO: 229 K/UL (ref 135–450)
PMV BLD AUTO: 7.9 FL (ref 5–10.5)
POTASSIUM SERPL-SCNC: 3.8 MMOL/L (ref 3.5–5.1)
PROT SERPL-MCNC: 6.5 G/DL (ref 6.4–8.2)
RBC # BLD AUTO: 4.6 M/UL (ref 4–5.2)
SODIUM SERPL-SCNC: 143 MMOL/L (ref 136–145)
TIBC SERPL-MCNC: 375 UG/DL (ref 260–445)
TRIGL SERPL-MCNC: 235 MG/DL (ref 0–150)
TSH SERPL DL<=0.005 MIU/L-ACNC: 2.04 UIU/ML (ref 0.27–4.2)
VLDLC SERPL CALC-MCNC: 47 MG/DL
WBC # BLD AUTO: 4.4 K/UL (ref 4–11)

## 2024-05-08 DIAGNOSIS — E83.42 HYPOMAGNESEMIA: Primary | ICD-10-CM

## 2024-05-08 RX ORDER — MAGNESIUM OXIDE 400 MG/1
400 TABLET ORAL DAILY
Qty: 30 TABLET | Refills: 1 | Status: SHIPPED | OUTPATIENT
Start: 2024-05-08

## 2024-05-21 NOTE — TELEPHONE ENCOUNTER
Refill Request     CONFIRM preferred pharmacy with the patient.    If Mail Order Rx - Pend for 90 day refill.      Last Seen: Last Seen Department: 5/6/2024  Last Seen by PCP: 4/23/2024    Last Written: Tradjenta 4/29/24 30 tabs 2 refills     If no future appointment scheduled:  Review the last OV with PCP and review information for follow-up visit,  Route STAFF MESSAGE with patient name to the  Pool for scheduling with the following information:            -  Timing of next visit           -  Visit type ie Physical, OV, etc           -  Diagnoses/Reason ie. COPD, HTN - Do not use MEDICATION, Follow-up or CHECK UP - Give reason for visit      Next Appointment:   Future Appointments   Date Time Provider Department Center   6/4/2024  2:30 PM Chalo Newton MD [facility]   7/23/2024  1:30 PM Lynda Blank MD [facility] [place] [facility] [place]       Message sent to  to schedule appt with patient?  NO      Requested Prescriptions     Pending Prescriptions Disp Refills    linagliptin (TRADJENTA) 5 MG tablet 30 tablet 2     Sig: Take 1 tablet by mouth daily

## 2024-06-04 ENCOUNTER — OFFICE VISIT (OUTPATIENT)
Dept: CARDIOLOGY CLINIC | Age: 84
End: 2024-06-04
Payer: MEDICARE

## 2024-06-04 VITALS
WEIGHT: 129 LBS | SYSTOLIC BLOOD PRESSURE: 106 MMHG | OXYGEN SATURATION: 96 % | BODY MASS INDEX: 22.86 KG/M2 | HEIGHT: 63 IN | HEART RATE: 68 BPM | DIASTOLIC BLOOD PRESSURE: 66 MMHG

## 2024-06-04 DIAGNOSIS — I10 PRIMARY HYPERTENSION: ICD-10-CM

## 2024-06-04 DIAGNOSIS — I65.23 BILATERAL CAROTID ARTERY STENOSIS: Primary | ICD-10-CM

## 2024-06-04 DIAGNOSIS — E78.2 MIXED HYPERLIPIDEMIA: ICD-10-CM

## 2024-06-04 DIAGNOSIS — I25.10 CORONARY ARTERY DISEASE INVOLVING NATIVE CORONARY ARTERY OF NATIVE HEART WITHOUT ANGINA PECTORIS: ICD-10-CM

## 2024-06-04 PROCEDURE — 1123F ACP DISCUSS/DSCN MKR DOCD: CPT | Performed by: INTERNAL MEDICINE

## 2024-06-04 PROCEDURE — 3078F DIAST BP <80 MM HG: CPT | Performed by: INTERNAL MEDICINE

## 2024-06-04 PROCEDURE — 99214 OFFICE O/P EST MOD 30 MIN: CPT | Performed by: INTERNAL MEDICINE

## 2024-06-04 PROCEDURE — 3074F SYST BP LT 130 MM HG: CPT | Performed by: INTERNAL MEDICINE

## 2024-06-04 NOTE — PROGRESS NOTES
[facility]   Cardiac Follow UP    Referring Provider: MISHEL Michael.     Chief Complaint   Patient presents with    Follow-up    Coronary Artery Disease    Hypertension    Hyperlipidemia    Fatigue     Initially saw her at the request of Dr. Blank for fatigue, CAMILO, and abnormal EKG 11/21.     SUBJECTIVE: Ms. Crane has PMH NOCAD, HTN, HLD, DM II, CKD, GERD; no complaints today     History of Present Illness:   Jada Crane is a 84 y.o. female who has PMH NOCAD dx 12/21, HTN, HLD, elevated TG, borderline DM, Left carotid disease (50-69%), and GERD. GXT nuc 8/26/2010 negative. ECHO 12/8/21 EF=62%--Frequent PVC's; Due to CAMILO, CAD RF's, and abnormal T inversion on EKG underwent LHC with Dr. Corona 12/8/2021 showing NOCAD (20% and 40-50% LAD, 30% CCx, 30% RCA stenoses). She underwent sleep study and did not have sleep apnea. Her Mg+ was 1.40 3/10/2022 and same on 1/9/23. She stopped taking fish oil due to causing itching.  Most recent VL Carotids 3/20/24 showed Carotid doppler with left < 50% blockage and right 50-69% blockage (no sig change 3/23 or 2/22). EKG 8/15/23 SB 59bpm; NST change.   Today she reports feeling good.  The patient is compliant with medications.  Cost of medications is affordable.  No endorsed side effects. She states she has felt her heart race in the past.  Denies any recent episodes. Very infrequent.  Patient denies chest pain, sob, dizziness or syncope.  She plans on going to [place], [place] for vacation this summer.     Weight today 129#  (up 1 lb from 8/2023)    Past Medical History:   has a past medical history of Acute kidney injury superimposed on CKD (HCC), Anal fissure, Back pain, Carotid stenosis, Chronic insomnia, Chronic kidney disease, stage III (moderate) ([facility]), Depression, Diabetic retinopathy of both eyes ([facility]), DM type 2 with diabetic dyslipidemia ([facility]), Fecal impaction ([facility]), Fecal impaction in rectum ([facility]), GERD (gastroesophageal reflux disease),

## 2024-06-04 NOTE — PATIENT INSTRUCTIONS
Plan:  Recommend checking with Dr. Blank regarding your Vit D level and also have your magnesium level rechecked.    No change in medications today.  Continue current regimen  Continue conservative measures ankle swelling: Elevate your legs to heart level while at rest, low salt diet, compression stockings as tolerated.    No cardiac testing warranted at this time.   Follow up in 1 year

## 2024-06-19 DIAGNOSIS — D64.9 ANEMIA, UNSPECIFIED TYPE: ICD-10-CM

## 2024-06-19 DIAGNOSIS — E83.42 HYPOMAGNESEMIA: ICD-10-CM

## 2024-06-20 LAB
FOLATE SERPL-MCNC: 16.18 NG/ML (ref 4.78–24.2)
MAGNESIUM SERPL-MCNC: 1.8 MG/DL (ref 1.8–2.4)
VIT B12 SERPL-MCNC: 498 PG/ML (ref 211–911)

## 2024-07-23 ENCOUNTER — OFFICE VISIT (OUTPATIENT)
Dept: FAMILY MEDICINE CLINIC | Age: 84
End: 2024-07-23
Payer: MEDICARE

## 2024-07-23 VITALS
OXYGEN SATURATION: 96 % | TEMPERATURE: 97 F | WEIGHT: 132 LBS | DIASTOLIC BLOOD PRESSURE: 60 MMHG | HEIGHT: 63 IN | BODY MASS INDEX: 23.39 KG/M2 | SYSTOLIC BLOOD PRESSURE: 116 MMHG | HEART RATE: 70 BPM

## 2024-07-23 DIAGNOSIS — I12.9 TYPE 2 DM WITH CKD STAGE 3 AND HYPERTENSION (HCC): ICD-10-CM

## 2024-07-23 DIAGNOSIS — F51.04 CHRONIC INSOMNIA: ICD-10-CM

## 2024-07-23 DIAGNOSIS — N18.32 STAGE 3B CHRONIC KIDNEY DISEASE (HCC): ICD-10-CM

## 2024-07-23 DIAGNOSIS — E11.22 TYPE 2 DM WITH CKD STAGE 3 AND HYPERTENSION (HCC): ICD-10-CM

## 2024-07-23 DIAGNOSIS — E11.65 UNCONTROLLED TYPE 2 DIABETES MELLITUS WITH HYPERGLYCEMIA (HCC): Primary | ICD-10-CM

## 2024-07-23 DIAGNOSIS — N18.30 TYPE 2 DM WITH CKD STAGE 3 AND HYPERTENSION (HCC): ICD-10-CM

## 2024-07-23 LAB — HBA1C MFR BLD: 8 %

## 2024-07-23 PROCEDURE — 3074F SYST BP LT 130 MM HG: CPT | Performed by: FAMILY MEDICINE

## 2024-07-23 PROCEDURE — 1123F ACP DISCUSS/DSCN MKR DOCD: CPT | Performed by: FAMILY MEDICINE

## 2024-07-23 PROCEDURE — 99214 OFFICE O/P EST MOD 30 MIN: CPT | Performed by: FAMILY MEDICINE

## 2024-07-23 PROCEDURE — G2211 COMPLEX E/M VISIT ADD ON: HCPCS | Performed by: FAMILY MEDICINE

## 2024-07-23 PROCEDURE — 83036 HEMOGLOBIN GLYCOSYLATED A1C: CPT | Performed by: FAMILY MEDICINE

## 2024-07-23 PROCEDURE — 3078F DIAST BP <80 MM HG: CPT | Performed by: FAMILY MEDICINE

## 2024-07-23 PROCEDURE — 3052F HG A1C>EQUAL 8.0%<EQUAL 9.0%: CPT | Performed by: FAMILY MEDICINE

## 2024-07-23 RX ORDER — PAROXETINE HYDROCHLORIDE 20 MG/1
TABLET, FILM COATED ORAL
COMMUNITY
Start: 2024-06-06 | End: 2024-07-23

## 2024-07-23 RX ORDER — METFORMIN HYDROCHLORIDE 500 MG/1
500 TABLET, EXTENDED RELEASE ORAL
Qty: 30 TABLET | Refills: 2 | Status: SHIPPED | OUTPATIENT
Start: 2024-07-23

## 2024-07-23 RX ORDER — QUETIAPINE FUMARATE 50 MG/1
TABLET, FILM COATED ORAL
COMMUNITY
Start: 2024-06-08

## 2024-07-24 ASSESSMENT — ENCOUNTER SYMPTOMS: CONSTIPATION: 1

## 2024-07-24 NOTE — PROGRESS NOTES
Jada Crane (:  1940) is a 84 y.o. female,Established patient, here for evaluation of the following chief complaint(s):  Diabetes      Assessment & Plan   1. Uncontrolled type 2 diabetes mellitus with hyperglycemia (HCC)  -     POCT glycosylated hemoglobin (Hb A1C)  -     metFORMIN (GLUCOPHAGE-XR) 500 MG extended release tablet; Take 1 tablet by mouth daily (with breakfast), Disp-30 tablet, R-2Normal  Improved on linagliptin 5 mg daily, but not yet controlled. Goal for hemoglobin A1C is <7.5% given her age. Will add a small amount of metformin as her GFR is currently improved. Discussed we will need to monitor GFR closely and will recheck at next visit. Continue linagliptin at 5 mg daily. Recheck hemoglobin A1C in 3 months.   2. Type 2 DM with CKD stage 3 and hypertension (HCC)  -     POCT glycosylated hemoglobin (Hb A1C)  -     metFORMIN (GLUCOPHAGE-XR) 500 MG extended release tablet; Take 1 tablet by mouth daily (with breakfast), Disp-30 tablet, R-2Normal  HTN well controlled, renal function improving now off HCTZ. Will continue losartan 100 mg daily. Will start small dose of metformin for uncontrolled DM type 2, recheck renal panel at next visit.   3. Stage 3b chronic kidney disease (HCC)  Improving. Continue losartan. Adding metformin for DM, will recheck GFR at next visit to ensure it is remains stable.   4. Chronic insomnia  We discussed other options for insomnia. She tried CBT-I once, does not wish try it again. She feels she would like to continue on Seroquel, perhaps trying 75 mg nightly for now. Call or return to clinic prn if these symptoms worsen or fail to improve as anticipated.     Return in about 3 months (around 10/23/2024) for Diabetes.       Subjective    Chief Complaint   Patient presents with    Diabetes        Diabetes Mellitus Type 2: Current symptoms/problems include  fatigue .    Medication compliance:  compliant all of the time  Diabetic diet compliance:  compliant most of

## 2024-08-13 NOTE — TELEPHONE ENCOUNTER
Refill Request     CONFIRM preferred pharmacy with the patient.    If Mail Order Rx - Pend for 90 day refill.      Last Seen: Last Seen Department: 7/23/2024  Last Seen by PCP: 7/23/2024    Last Written: 05/08/24 30 with 1 refill    If no future appointment scheduled:  Review the last OV with PCP and review information for follow-up visit,  Route STAFF MESSAGE with patient name to the  Pool for scheduling with the following information:            -  Timing of next visit           -  Visit type ie Physical, OV, etc           -  Diagnoses/Reason ie. COPD, HTN - Do not use MEDICATION, Follow-up or CHECK UP - Give reason for visit      Next Appointment:   No future appointments.    Message sent to  to schedule appt with patient?  NO      Requested Prescriptions     Pending Prescriptions Disp Refills    magnesium oxide (MAG-OX) 400 (240 Mg) MG tablet [Pharmacy Med Name: MAGNESIUM OXIDE 400 MG TABLET] 30 tablet      Sig: TAKE 1 TABLET BY MOUTH DAILY

## 2024-08-14 RX ORDER — LANOLIN ALCOHOL/MO/W.PET/CERES
400 CREAM (GRAM) TOPICAL DAILY
Qty: 30 TABLET | Refills: 5 | Status: SHIPPED | OUTPATIENT
Start: 2024-08-14

## 2024-08-20 DIAGNOSIS — F51.04 CHRONIC INSOMNIA: ICD-10-CM

## 2024-08-20 RX ORDER — QUETIAPINE FUMARATE 50 MG/1
TABLET, FILM COATED ORAL
Qty: 45 TABLET | Refills: 3 | Status: SHIPPED | OUTPATIENT
Start: 2024-08-20

## 2024-08-20 NOTE — TELEPHONE ENCOUNTER
Refill Request     CONFIRM preferred pharmacy with the patient.    If Mail Order Rx - Pend for 90 day refill.      Last Seen: Last Seen Department: 7/23/2024  Last Seen by PCP: 7/23/2024    Last Written: 06/08/24     If no future appointment scheduled:  Review the last OV with PCP and review information for follow-up visit,  Route STAFF MESSAGE with patient name to the  Pool for scheduling with the following information:            -  Timing of next visit           -  Visit type ie Physical, OV, etc           -  Diagnoses/Reason ie. COPD, HTN - Do not use MEDICATION, Follow-up or CHECK UP - Give reason for visit      Next Appointment:   No future appointments.    Message sent to  to schedule appt with patient?  NO      Requested Prescriptions     Pending Prescriptions Disp Refills    QUEtiapine (SEROQUEL) 50 MG tablet [Pharmacy Med Name: QUETIAPINE FUMARATE 50 MG TAB] 45 tablet      Sig: TAKE 1/2 TABLET BY MOUTH EVERY NIGHT AT BEDTIME

## 2024-09-03 ENCOUNTER — TELEPHONE (OUTPATIENT)
Dept: FAMILY MEDICINE CLINIC | Age: 84
End: 2024-09-03

## 2024-09-03 NOTE — TELEPHONE ENCOUNTER
Patient called into office stating that she has no energy, and would like to be seen by Dr. Blank.  Increased fatigue, patient denies fever, nausea.  Patient states that she has poor appetite, indegestion, and burping a lot from the indigestion and has been going on for a week.  Patient scheduled for 09/05/24.

## 2024-09-04 NOTE — TELEPHONE ENCOUNTER
Refill Request     CONFIRM preferred pharmacy with the patient.    If Mail Order Rx - Pend for 90 day refill.      Last Seen: Last Seen Department: 7/23/2024  Last Seen by PCP: 7/23/2024    Last Written: 3/8/2024    If no future appointment scheduled:  Review the last OV with PCP and review information for follow-up visit,  Route STAFF MESSAGE with patient name to the  Pool for scheduling with the following information:            -  Timing of next visit           -  Visit type ie Physical, OV, etc           -  Diagnoses/Reason ie. COPD, HTN - Do not use MEDICATION, Follow-up or CHECK UP - Give reason for visit      Next Appointment:   Future Appointments   Date Time Provider Department Center   9/5/2024 11:30 AM Lynda Blank MD [facility] DEP       Message sent to  to schedule appt with patient?  NO      Requested Prescriptions     Pending Prescriptions Disp Refills    PARoxetine (PAXIL) 20 MG tablet [Pharmacy Med Name: PAROXETINE HCL 20 MG TABLET] 90 tablet 1     Sig: TAKE 1 TABLET BY MOUTH DAILY    losartan (COZAAR) 100 MG tablet [Pharmacy Med Name: LOSARTAN POTASSIUM 100 MG TAB] 90 tablet 1     Sig: TAKE 1 TABLET BY MOUTH DAILY

## 2024-09-05 ENCOUNTER — OFFICE VISIT (OUTPATIENT)
Dept: FAMILY MEDICINE CLINIC | Age: 84
End: 2024-09-05

## 2024-09-05 VITALS
HEART RATE: 70 BPM | DIASTOLIC BLOOD PRESSURE: 62 MMHG | SYSTOLIC BLOOD PRESSURE: 110 MMHG | WEIGHT: 127.6 LBS | TEMPERATURE: 98 F | OXYGEN SATURATION: 95 % | HEIGHT: 69 IN | BODY MASS INDEX: 18.9 KG/M2

## 2024-09-05 DIAGNOSIS — R68.81 EARLY SATIETY: ICD-10-CM

## 2024-09-05 DIAGNOSIS — R53.82 CHRONIC FATIGUE: Primary | ICD-10-CM

## 2024-09-05 DIAGNOSIS — R10.13 DYSPEPSIA: ICD-10-CM

## 2024-09-05 DIAGNOSIS — R07.9 CHEST PAIN, UNSPECIFIED TYPE: ICD-10-CM

## 2024-09-05 DIAGNOSIS — K59.00 CONSTIPATION, UNSPECIFIED CONSTIPATION TYPE: ICD-10-CM

## 2024-09-05 DIAGNOSIS — F51.04 CHRONIC INSOMNIA: ICD-10-CM

## 2024-09-05 DIAGNOSIS — R10.11 RUQ ABDOMINAL PAIN: ICD-10-CM

## 2024-09-05 SDOH — ECONOMIC STABILITY: FOOD INSECURITY: WITHIN THE PAST 12 MONTHS, YOU WORRIED THAT YOUR FOOD WOULD RUN OUT BEFORE YOU GOT MONEY TO BUY MORE.: NEVER TRUE

## 2024-09-05 SDOH — ECONOMIC STABILITY: FOOD INSECURITY: WITHIN THE PAST 12 MONTHS, THE FOOD YOU BOUGHT JUST DIDN'T LAST AND YOU DIDN'T HAVE MONEY TO GET MORE.: NEVER TRUE

## 2024-09-05 SDOH — ECONOMIC STABILITY: INCOME INSECURITY: HOW HARD IS IT FOR YOU TO PAY FOR THE VERY BASICS LIKE FOOD, HOUSING, MEDICAL CARE, AND HEATING?: NOT VERY HARD

## 2024-09-05 ASSESSMENT — PATIENT HEALTH QUESTIONNAIRE - PHQ9
5. POOR APPETITE OR OVEREATING: NEARLY EVERY DAY
9. THOUGHTS THAT YOU WOULD BE BETTER OFF DEAD, OR OF HURTING YOURSELF: NOT AT ALL
2. FEELING DOWN, DEPRESSED OR HOPELESS: SEVERAL DAYS
4. FEELING TIRED OR HAVING LITTLE ENERGY: MORE THAN HALF THE DAYS
10. IF YOU CHECKED OFF ANY PROBLEMS, HOW DIFFICULT HAVE THESE PROBLEMS MADE IT FOR YOU TO DO YOUR WORK, TAKE CARE OF THINGS AT HOME, OR GET ALONG WITH OTHER PEOPLE: NOT DIFFICULT AT ALL
3. TROUBLE FALLING OR STAYING ASLEEP: NEARLY EVERY DAY
8. MOVING OR SPEAKING SO SLOWLY THAT OTHER PEOPLE COULD HAVE NOTICED. OR THE OPPOSITE, BEING SO FIGETY OR RESTLESS THAT YOU HAVE BEEN MOVING AROUND A LOT MORE THAN USUAL: NOT AT ALL
1. LITTLE INTEREST OR PLEASURE IN DOING THINGS: NOT AT ALL
7. TROUBLE CONCENTRATING ON THINGS, SUCH AS READING THE NEWSPAPER OR WATCHING TELEVISION: NOT AT ALL
SUM OF ALL RESPONSES TO PHQ QUESTIONS 1-9: 9
SUM OF ALL RESPONSES TO PHQ QUESTIONS 1-9: 9
6. FEELING BAD ABOUT YOURSELF - OR THAT YOU ARE A FAILURE OR HAVE LET YOURSELF OR YOUR FAMILY DOWN: NOT AT ALL
SUM OF ALL RESPONSES TO PHQ9 QUESTIONS 1 & 2: 1
SUM OF ALL RESPONSES TO PHQ QUESTIONS 1-9: 9
SUM OF ALL RESPONSES TO PHQ QUESTIONS 1-9: 9

## 2024-09-05 ASSESSMENT — ANXIETY QUESTIONNAIRES
1. FEELING NERVOUS, ANXIOUS, OR ON EDGE: NOT AT ALL
3. WORRYING TOO MUCH ABOUT DIFFERENT THINGS: NOT AT ALL
GAD7 TOTAL SCORE: 0
7. FEELING AFRAID AS IF SOMETHING AWFUL MIGHT HAPPEN: NOT AT ALL
6. BECOMING EASILY ANNOYED OR IRRITABLE: NOT AT ALL
4. TROUBLE RELAXING: NOT AT ALL
2. NOT BEING ABLE TO STOP OR CONTROL WORRYING: NOT AT ALL
5. BEING SO RESTLESS THAT IT IS HARD TO SIT STILL: NOT AT ALL
IF YOU CHECKED OFF ANY PROBLEMS ON THIS QUESTIONNAIRE, HOW DIFFICULT HAVE THESE PROBLEMS MADE IT FOR YOU TO DO YOUR WORK, TAKE CARE OF THINGS AT HOME, OR GET ALONG WITH OTHER PEOPLE: NOT DIFFICULT AT ALL

## 2024-09-06 RX ORDER — PAROXETINE 20 MG/1
20 TABLET, FILM COATED ORAL DAILY
Qty: 90 TABLET | Refills: 1 | Status: SHIPPED | OUTPATIENT
Start: 2024-09-06

## 2024-09-06 RX ORDER — LOSARTAN POTASSIUM 100 MG/1
100 TABLET ORAL DAILY
Qty: 90 TABLET | Refills: 1 | Status: SHIPPED | OUTPATIENT
Start: 2024-09-06

## 2024-09-09 ENCOUNTER — TELEPHONE (OUTPATIENT)
Dept: FAMILY MEDICINE CLINIC | Age: 84
End: 2024-09-09

## 2024-09-09 DIAGNOSIS — I10 ESSENTIAL HYPERTENSION: ICD-10-CM

## 2024-09-09 RX ORDER — METOPROLOL SUCCINATE 100 MG/1
TABLET, EXTENDED RELEASE ORAL
Qty: 90 TABLET | Refills: 2 | Status: SHIPPED | OUTPATIENT
Start: 2024-09-09

## 2024-09-09 ASSESSMENT — ENCOUNTER SYMPTOMS
COUGH: 0
NAUSEA: 0
CHANGE IN BOWEL HABIT: 1
CHOKING: 0
HEARTBURN: 1
HOARSE VOICE: 0
ABDOMINAL PAIN: 1
VOMITING: 0

## 2024-09-11 ENCOUNTER — HOSPITAL ENCOUNTER (OUTPATIENT)
Dept: CT IMAGING | Age: 84
Discharge: HOME | End: 2024-09-11
Payer: MEDICARE

## 2024-09-11 ENCOUNTER — TELEPHONE (OUTPATIENT)
Dept: FAMILY MEDICINE CLINIC | Age: 84
End: 2024-09-11

## 2024-09-11 DIAGNOSIS — R10.11 RUQ ABDOMINAL PAIN: ICD-10-CM

## 2024-09-11 DIAGNOSIS — R68.81 EARLY SATIETY: ICD-10-CM

## 2024-09-11 DIAGNOSIS — K59.00 CONSTIPATION, UNSPECIFIED CONSTIPATION TYPE: ICD-10-CM

## 2024-09-11 DIAGNOSIS — R10.13 DYSPEPSIA: ICD-10-CM

## 2024-09-11 LAB
PERFORMED ON: ABNORMAL
POC CREATININE: 1.5 MG/DL (ref 0.6–1.2)
POC SAMPLE TYPE: ABNORMAL

## 2024-09-11 PROCEDURE — 82565 ASSAY OF CREATININE: CPT

## 2024-09-11 PROCEDURE — 74177 CT ABD & PELVIS W/CONTRAST: CPT

## 2024-09-11 PROCEDURE — 6360000004 HC RX CONTRAST MEDICATION: Performed by: FAMILY MEDICINE

## 2024-09-11 RX ORDER — IOPAMIDOL 755 MG/ML
75 INJECTION, SOLUTION INTRAVASCULAR
Status: COMPLETED | OUTPATIENT
Start: 2024-09-11 | End: 2024-09-11

## 2024-09-11 RX ADMIN — DIATRIZOATE MEGLUMINE AND DIATRIZOATE SODIUM 12 ML: 660; 100 LIQUID ORAL; RECTAL at 11:14

## 2024-09-11 RX ADMIN — IOPAMIDOL 75 ML: 755 INJECTION, SOLUTION INTRAVENOUS at 11:14

## 2024-09-13 DIAGNOSIS — R68.81 EARLY SATIETY: ICD-10-CM

## 2024-09-13 DIAGNOSIS — R10.13 DYSPEPSIA: ICD-10-CM

## 2024-09-13 DIAGNOSIS — K59.00 CONSTIPATION, UNSPECIFIED CONSTIPATION TYPE: ICD-10-CM

## 2024-09-13 DIAGNOSIS — R10.11 RUQ ABDOMINAL PAIN: Primary | ICD-10-CM

## 2024-10-01 ENCOUNTER — TELEPHONE (OUTPATIENT)
Dept: FAMILY MEDICINE CLINIC | Age: 84
End: 2024-10-01

## 2024-10-01 DIAGNOSIS — F51.04 CHRONIC INSOMNIA: ICD-10-CM

## 2024-10-01 RX ORDER — QUETIAPINE FUMARATE 50 MG/1
100 TABLET, FILM COATED ORAL NIGHTLY
Qty: 180 TABLET | Refills: 1 | Status: SHIPPED | OUTPATIENT
Start: 2024-10-01

## 2024-10-01 NOTE — TELEPHONE ENCOUNTER
The patient has been notified of this information and all questions answered.  Patient will call Dr. Colindres.

## 2024-10-01 NOTE — TELEPHONE ENCOUNTER
Received phone call from the patient in regards to   Omeprazole and Linzee is causing her to have diarrhea. She stated that she is uinsure if she is to continue taking them or to lay off and to see.   She stated that she is just starting having today to have the diarrhea she stated that she could not go anywhere even if she wanted to it is so bad.

## 2024-10-03 DIAGNOSIS — E78.2 MIXED HYPERLIPIDEMIA: ICD-10-CM

## 2024-10-04 RX ORDER — ATORVASTATIN CALCIUM 80 MG/1
80 TABLET, FILM COATED ORAL NIGHTLY
Qty: 90 TABLET | Refills: 1 | Status: SHIPPED | OUTPATIENT
Start: 2024-10-04

## 2024-10-04 NOTE — TELEPHONE ENCOUNTER
Refill Request     CONFIRM preferred pharmacy with the patient.    If Mail Order Rx - Pend for 90 day refill.      Last Seen: Last Seen Department: 9/5/2024  Last Seen by PCP: 9/5/2024    Last Written: 10/23/23 90 with 3 refills     If no future appointment scheduled:  Review the last OV with PCP and review information for follow-up visit,  Route STAFF MESSAGE with patient name to the  Pool for scheduling with the following information:            -  Timing of next visit           -  Visit type ie Physical, OV, etc           -  Diagnoses/Reason ie. COPD, HTN - Do not use MEDICATION, Follow-up or CHECK UP - Give reason for visit      Next Appointment:   No future appointments.    Message sent to  to schedule appt with patient?  YES Return in about 3 months (around 10/23/2024) for Diabetes.       Requested Prescriptions     Pending Prescriptions Disp Refills    atorvastatin (LIPITOR) 80 MG tablet [Pharmacy Med Name: ATORVASTATIN 80 MG TABLET] 90 tablet 3     Sig: TAKE ONE TABLET BY MOUTH EVERY NIGHT AT BEDTIME

## 2024-10-11 ENCOUNTER — TELEPHONE (OUTPATIENT)
Dept: FAMILY MEDICINE CLINIC | Age: 84
End: 2024-10-11

## 2024-10-11 NOTE — PROGRESS NOTES
Jada H Burns    Age 84 y.o.    female    1940    N [id number]    10/14/2024  Arrival Time_____________  OR Time____________30 Min     Procedure(s):  ESOPHAGOGASTRODUODENOSCOPY                      Monitor Anesthesia Care    Surgeon(s):  Bernadine Miguel, MD       Phone [phone] (home)     [facility]  Date  Info Source  Home  Cell         Work  _____________________________________________________________________  _____________________________________________________________________  _____________________________________________________________________  _____________________________________________________________________  _____________________________________________________________________    PCP _____________________________ Phone_________________     H&P  ________________  Bringing      Chart              Epic      DOS      Called________  EKG ________________   Bringing      Chart              Epic      DOS      Called________  LABS________________   Bringing     Chart              Epic      DOS      Called________  Cardiac Clearance ______ Bringing      Chart              Epic      DOS      Called________  Pulmonary Clearance____ Bringing      Chart              Epic      DOS      Called________    Cardiologist________________________ Phone___________________________  Pulmonologist_______________________Phone___________________________    ? Advance Directives   ? Alevism concerns / Waiver on Chart            PAT Communications________________  ? Pre-op Instructions Given /Understood          _________________________________  ? Directions to Surgery Center                          _________________________________  ? Transportation Home_______________      __________________________________  ? Crutches/Walker__________________        __________________________________    Orders: Hard copy/ EPIC                 Transcribed/ EPIC              _______Wt.    ________Pharmacy

## 2024-10-11 NOTE — H&P
[facility]   Pre-operative History and Physical    Patient: Jada Crane  : 1940  Acct#:     HISTORY OF PRESENT ILLNESS:    Indications: GERD    The patient is a 84-year-old female with medical history of diabetes mellitus type 2, CKD stage III, hypertension, GERD, chronic constipation, cholecystecomy follows for right mid abdominal pain and constipation. Reports constipation is improved on linzess 72 mcg orally 3-4 times weekly. Reports soft bowel movement 3-4 times weekly. Linzess 72 mcg daily caused diarrhea. Heartburn and belching have also resolved with increased to omeprazole 40mg daily. Denies nausea, vomiting, fever, chills,melena or hematochezia.         Labs on 2024 reviewed with unremarkable liver test, CBC and BMP except BUN 25. Abnormal lipid panel with elevated triglycerides 235. Normal total cholesterol 130, LDL 53, low HDL 30. TSH 2.04, HbA1c 8%.         CT abdomen and pelvis on 2024 noted no acute abnormality. Surgically absent gallbladder.         Last EGD 2016 Dr. Minor: normal, no evidence of Shaffer's esophagus         Last Colonoscopy 2016 Dr. Minor: normal.         Past Medical History:        Diagnosis Date    Acute kidney injury superimposed on CKD ([facility]) 2022    Anal fissure 2013    Arthritis     Back pain     Carotid stenosis 2021    Carotid stenosis     left    Chronic insomnia     Chronic kidney disease, stage III (moderate) ([facility]) 2017    Depression     Diabetic retinopathy of both eyes ([facility])     DM type 2 with diabetic dyslipidemia ([facility]) 2019    Fecal impaction ([facility]) 2022    Fecal impaction in rectum ([facility]) 2022    GERD (gastroesophageal reflux disease)     Dr. Minor (GI)    Glossitis 2017    Hearing loss     Hypercholesteremia     Hypertension     Lumbar radiculopathy 2017    Osteopenia 2019    Patellofemoral stress syndrome of right knee 2017    Uncontrolled type 2 diabetes with

## 2024-10-11 NOTE — PROGRESS NOTES
Date and time of surgery : 10/14/24 at 1230             Arrival Time:  1130     Bring Picture ID and insurance card.  Please wear simple, loose fitting clothing to the hospital.   Do not bring valuables (money, credit cards, checkbooks, etc.)   Do not wear any makeup (including  eye makeup) and no nail polish or artificial nails on your fingers or toes.  DO NOT wear any jewelry or piercings on day of surgery.  All body piercing jewelry must be removed.  If you have dentures, they will be removed before going to the OR; we will provide you a container.  If you wear contact lenses or glasses, they will be removed; please bring a case for them.  Shower the evening before or morning of surgery with antibacterial soap.  Nothing to eat or drink after midnight the day before surgery.   You may brush your teeth and gargle the morning of surgery.  DO NOT SWALLOW WATER.   The morning of your procedure take only Amlodipine with a sip of water  Aspirin, Ibuprofen, Advil, Naproxen, Vitamin E and other Anti-inflammatory products and supplements should be stopped for 5 -7days before surgery or as directed by your physician.  Do not smoke or drink any alcoholic beverages 24 hours prior to surgery.  This includes NA Beer. Refrain from the usage of any recreational drugs, including non-prescribed prescription drugs.   You MUST plan for a responsible adult to stay on site while you are here and take you home after your surgery. You will not be allowed to leave alone or drive yourself home. It is strongly suggested someone stay with you the first 24 hrs. Your surgery will be cancelled if you do not have a ride home.  To help prevent infection, change your sheets the night before surgery.   If you  have a Living Will and Durable Power of  for Healthcare, please bring in a copy.  Notify your Surgeon if you develop any illness between now and time of surgery. Cough, cold, fever, sore throat, nausea, vomiting, etc.  Please notify

## 2024-10-11 NOTE — TELEPHONE ENCOUNTER
Patient is scheduled for an EGD on 10/14/24 and was instructed to call our office to see if she needs to stop any medications prior to the procedure and which medications to take after the procedure as she will not be eating until the following day. Please advise, thanks.

## 2024-10-14 ENCOUNTER — HOSPITAL ENCOUNTER (OUTPATIENT)
Age: 84
Setting detail: OUTPATIENT SURGERY
Discharge: HOME | End: 2024-10-14
Attending: INTERNAL MEDICINE | Admitting: INTERNAL MEDICINE
Payer: MEDICARE

## 2024-10-14 ENCOUNTER — ANESTHESIA EVENT (OUTPATIENT)
Dept: ENDOSCOPY | Age: 84
End: 2024-10-14
Payer: MEDICARE

## 2024-10-14 ENCOUNTER — ANESTHESIA (OUTPATIENT)
Dept: ENDOSCOPY | Age: 84
End: 2024-10-14
Payer: MEDICARE

## 2024-10-14 VITALS
HEART RATE: 68 BPM | SYSTOLIC BLOOD PRESSURE: 133 MMHG | BODY MASS INDEX: 22.5 KG/M2 | TEMPERATURE: 97.9 F | HEIGHT: 63 IN | WEIGHT: 127 LBS | DIASTOLIC BLOOD PRESSURE: 62 MMHG | OXYGEN SATURATION: 96 % | RESPIRATION RATE: 15 BRPM

## 2024-10-14 DIAGNOSIS — K21.9 GASTROESOPHAGEAL REFLUX DISEASE, UNSPECIFIED WHETHER ESOPHAGITIS PRESENT: ICD-10-CM

## 2024-10-14 LAB
GLUCOSE BLD-MCNC: 128 MG/DL (ref 70–99)
PERFORMED ON: ABNORMAL

## 2024-10-14 PROCEDURE — 3609012400 HC EGD TRANSORAL BIOPSY SINGLE/MULTIPLE: Performed by: INTERNAL MEDICINE

## 2024-10-14 PROCEDURE — 3700000001 HC ADD 15 MINUTES (ANESTHESIA): Performed by: INTERNAL MEDICINE

## 2024-10-14 PROCEDURE — 3700000000 HC ANESTHESIA ATTENDED CARE: Performed by: INTERNAL MEDICINE

## 2024-10-14 PROCEDURE — 2709999900 HC NON-CHARGEABLE SUPPLY: Performed by: INTERNAL MEDICINE

## 2024-10-14 PROCEDURE — 88305 TISSUE EXAM BY PATHOLOGIST: CPT

## 2024-10-14 PROCEDURE — 2500000003 HC RX 250 WO HCPCS: Performed by: NURSE ANESTHETIST, CERTIFIED REGISTERED

## 2024-10-14 PROCEDURE — 6360000002 HC RX W HCPCS: Performed by: NURSE ANESTHETIST, CERTIFIED REGISTERED

## 2024-10-14 PROCEDURE — 7100000011 HC PHASE II RECOVERY - ADDTL 15 MIN: Performed by: INTERNAL MEDICINE

## 2024-10-14 PROCEDURE — 7100000010 HC PHASE II RECOVERY - FIRST 15 MIN: Performed by: INTERNAL MEDICINE

## 2024-10-14 RX ORDER — LABETALOL HYDROCHLORIDE 5 MG/ML
5 INJECTION, SOLUTION INTRAVENOUS EVERY 10 MIN PRN
Status: DISCONTINUED | OUTPATIENT
Start: 2024-10-14 | End: 2024-10-14 | Stop reason: HOSPADM

## 2024-10-14 RX ORDER — DIPHENHYDRAMINE HYDROCHLORIDE 50 MG/ML
12.5 INJECTION INTRAMUSCULAR; INTRAVENOUS
Status: DISCONTINUED | OUTPATIENT
Start: 2024-10-14 | End: 2024-10-14 | Stop reason: HOSPADM

## 2024-10-14 RX ORDER — OXYCODONE HYDROCHLORIDE 5 MG/1
10 TABLET ORAL PRN
Status: DISCONTINUED | OUTPATIENT
Start: 2024-10-14 | End: 2024-10-14 | Stop reason: HOSPADM

## 2024-10-14 RX ORDER — SODIUM CHLORIDE 9 MG/ML
INJECTION, SOLUTION INTRAVENOUS PRN
Status: DISCONTINUED | OUTPATIENT
Start: 2024-10-14 | End: 2024-10-14 | Stop reason: HOSPADM

## 2024-10-14 RX ORDER — OXYCODONE HYDROCHLORIDE 5 MG/1
5 TABLET ORAL PRN
Status: DISCONTINUED | OUTPATIENT
Start: 2024-10-14 | End: 2024-10-14 | Stop reason: HOSPADM

## 2024-10-14 RX ORDER — SODIUM CHLORIDE 0.9 % (FLUSH) 0.9 %
5-40 SYRINGE (ML) INJECTION EVERY 12 HOURS SCHEDULED
Status: DISCONTINUED | OUTPATIENT
Start: 2024-10-14 | End: 2024-10-14 | Stop reason: HOSPADM

## 2024-10-14 RX ORDER — NALOXONE HYDROCHLORIDE 0.4 MG/ML
INJECTION, SOLUTION INTRAMUSCULAR; INTRAVENOUS; SUBCUTANEOUS PRN
Status: DISCONTINUED | OUTPATIENT
Start: 2024-10-14 | End: 2024-10-14 | Stop reason: HOSPADM

## 2024-10-14 RX ORDER — SODIUM CHLORIDE 0.9 % (FLUSH) 0.9 %
5-40 SYRINGE (ML) INJECTION PRN
Status: DISCONTINUED | OUTPATIENT
Start: 2024-10-14 | End: 2024-10-14 | Stop reason: HOSPADM

## 2024-10-14 RX ORDER — LIDOCAINE HYDROCHLORIDE 20 MG/ML
INJECTION, SOLUTION EPIDURAL; INFILTRATION; INTRACAUDAL; PERINEURAL
Status: DISCONTINUED | OUTPATIENT
Start: 2024-10-14 | End: 2024-10-14 | Stop reason: SDUPTHER

## 2024-10-14 RX ORDER — ONDANSETRON 2 MG/ML
4 INJECTION INTRAMUSCULAR; INTRAVENOUS
Status: DISCONTINUED | OUTPATIENT
Start: 2024-10-14 | End: 2024-10-14 | Stop reason: HOSPADM

## 2024-10-14 RX ORDER — PROPOFOL 10 MG/ML
INJECTION, EMULSION INTRAVENOUS
Status: DISCONTINUED | OUTPATIENT
Start: 2024-10-14 | End: 2024-10-14 | Stop reason: SDUPTHER

## 2024-10-14 RX ADMIN — PROPOFOL 40 MG: 10 INJECTION, EMULSION INTRAVENOUS at 12:06

## 2024-10-14 RX ADMIN — PROPOFOL 60 MG: 10 INJECTION, EMULSION INTRAVENOUS at 12:04

## 2024-10-14 RX ADMIN — LIDOCAINE HYDROCHLORIDE 50 MG: 20 INJECTION, SOLUTION EPIDURAL; INFILTRATION; INTRACAUDAL; PERINEURAL at 12:04

## 2024-10-14 ASSESSMENT — PAIN - FUNCTIONAL ASSESSMENT
PAIN_FUNCTIONAL_ASSESSMENT: NONE - DENIES PAIN
PAIN_FUNCTIONAL_ASSESSMENT: 0-10

## 2024-10-14 NOTE — ANESTHESIA PRE PROCEDURE
Pulmonary:normal exam  breath sounds clear to auscultation  (+)           asthma:     (-) COPD and sleep apnea                           Cardiovascular:  Exercise tolerance: good (>4 METS)  (+) hypertension:, CAD: non-obstructive    (-)  angina and  CAMILO      Rhythm: regular  Rate: normal                    Neuro/Psych:   (+) neuromuscular disease:, psychiatric history:   (-) seizures and TIA           GI/Hepatic/Renal:   (+) GERD:, renal disease: CRI     (-) liver disease       Endo/Other:    (+) Diabetes, : arthritis:..                 Abdominal:             Vascular:   + PVD, aortic or cerebral.       Other Findings:             Anesthesia Plan      TIVA     ASA 3                                   Yudelka Kiser MD   10/14/2024

## 2024-10-14 NOTE — DISCHARGE INSTRUCTIONS
PATIENT INSTRUCTIONS  POST-SEDATION        IMMEDIATELY FOLLOWING PROCEDURE:    Do not drive or operate machinery for the first twenty four hours after surgery.     Do not make any important decisions for twenty four hours after surgery or while taking narcotic pain medications or sedatives.     You should NOT BE LEFT UNATTENDED OR ALONE. A responsible adult should be with you for the rest of the day of your procedure and also during the night for your protection and safety.    You may experience some light headedness. Rest at home with activity as tolerated. You may not need to go to bed, but it is important to rest for the next 24 hours. You should not engage in athletic sports such as basketball, volleyball, jogging, skating, or activities requiring refined motor skills for 24 hours.   If you develop intractable nausea and vomiting or a severe headache please notify your doctor immediately.   You are not expected to have any fever, but if you feel warm, take your temperature. If you have a fever 101 degrees or higher, call your doctor.     If you have had an Endoscopy:   *Eat lightly for your first meal and gradually resume your normal / prescribed diet. DO NOT eat or drink until your gag reflex returns.   *If you have a sore throat you may use lozenges, or salt water gargles.      ONCE YOU ARE HOME, IF YOU SHOULD HAVE:  Difficulty in breathing, persistent nausea or vomiting, bleeding you feel is excessive, or pain that is unusual, increased abdominal bloating, or any swelling, fever / chills, call your physician. If you cannot contact your physician, but feel that your signs and symptoms need a physician's attention, go to the Emergency Department.      FOLLOW-UP:    Please follow up with your Primary Care Provider as scheduled or needed.    Call Miguel Colon MD if there are any GI concerns. [phone]      You may be receiving a follow up phone call to ask about your care.         Upper GI Endoscopy:

## 2024-10-14 NOTE — ANESTHESIA POSTPROCEDURE EVALUATION
CO2                      27                  05/07/2024 08:26 AM        BUN                      25 (H)              05/07/2024 08:26 AM        CREATININE               1.5 (H)             09/11/2024 10:28 AM        CREATININE               1.0                 05/07/2024 08:26 AM        GLUCOSE                  156 (H)             05/07/2024 08:26 AM   COAGS  Lab Results       Component                Value               Date/Time                  PROTIME                  14.4                03/23/2023 08:25 AM        INR                      1.13                03/23/2023 08:25 AM        APTT                     27.4                03/23/2023 08:25 AM     Intake & Output:  @24HRIO@    Nausea & Vomiting:  No    Level of Consciousness:  Awake    Pain Assessment:  Adequate analgesia    Anesthesia Complications:  No apparent anesthetic complications    SUMMARY      Vital signs stable  OK to discharge from Stage I post anesthesia care.  Care transferred from Anesthesiology department on discharge from perioperative area     There were no known notable events for this encounter.

## 2024-10-18 DIAGNOSIS — I12.9 TYPE 2 DM WITH CKD STAGE 3 AND HYPERTENSION (HCC): ICD-10-CM

## 2024-10-18 DIAGNOSIS — E11.65 UNCONTROLLED TYPE 2 DIABETES MELLITUS WITH HYPERGLYCEMIA (HCC): ICD-10-CM

## 2024-10-18 DIAGNOSIS — E11.22 TYPE 2 DM WITH CKD STAGE 3 AND HYPERTENSION (HCC): ICD-10-CM

## 2024-10-18 DIAGNOSIS — N18.30 TYPE 2 DM WITH CKD STAGE 3 AND HYPERTENSION (HCC): ICD-10-CM

## 2024-10-18 RX ORDER — LINAGLIPTIN 5 MG/1
5 TABLET, FILM COATED ORAL DAILY
Qty: 30 TABLET | Refills: 1 | Status: SHIPPED | OUTPATIENT
Start: 2024-10-18

## 2024-10-18 RX ORDER — METFORMIN HCL 500 MG
500 TABLET, EXTENDED RELEASE 24 HR ORAL DAILY
Qty: 30 TABLET | Refills: 2 | Status: SHIPPED | OUTPATIENT
Start: 2024-10-18

## 2024-10-18 NOTE — TELEPHONE ENCOUNTER
Refill Request     CONFIRM preferred pharmacy with the patient.    If Mail Order Rx - Pend for 90 day refill.      Last Seen: Last Seen Department: 9/5/2024  Last Seen by PCP: 9/5/2024    Last Written: 7/23/24 30 with 2 refills     If no future appointment scheduled:  Review the last OV with PCP and review information for follow-up visit,  Route STAFF MESSAGE with patient name to the  Pool for scheduling with the following information:            -  Timing of next visit           -  Visit type ie Physical, OV, etc           -  Diagnoses/Reason ie. COPD, HTN - Do not use MEDICATION, Follow-up or CHECK UP - Give reason for visit      Next Appointment:   No future appointments.    Message sent to  to schedule appt with patient?  YES Return in about 3 months (around 10/23/2024) for Diabetes.       Requested Prescriptions     Pending Prescriptions Disp Refills    metFORMIN (GLUCOPHAGE-XR) 500 MG extended release tablet [Pharmacy Med Name: METFORMIN HCL  MG TABLET] 30 tablet 2     Sig: TAKE 1 TABLET BY MOUTH DAILY WITH BREAKFAST

## 2024-10-18 NOTE — TELEPHONE ENCOUNTER
Future Appointments   Date Time Provider Department Center   11/5/2024  1:00 PM [facility]     Appt scheduled with the patient.

## 2024-10-18 NOTE — TELEPHONE ENCOUNTER
Refill Request     CONFIRM preferred pharmacy with the patient.    If Mail Order Rx - Pend for 90 day refill.      Last Seen: Last Seen Department: 9/5/2024  Last Seen by PCP: 7/17/2023    Last Written: 05/22/2024 30 tab 2 refills     If no future appointment scheduled:  Review the last OV with PCP and review information for follow-up visit,  Route STAFF MESSAGE with patient name to the  Pool for scheduling with the following information:            -  Timing of next visit           -  Visit type ie Physical, OV, etc           -  Diagnoses/Reason ie. COPD, HTN - Do not use MEDICATION, Follow-up or CHECK UP - Give reason for visit      Next Appointment:   No future appointments.    Message sent to  to schedule appt with patient?  YES  Return in about 3 months (around 10/23/2024) for Diabetes.       Requested Prescriptions     Pending Prescriptions Disp Refills    TRADJENTA 5 MG tablet [Pharmacy Med Name: TRADJENTA 5 MG TABLET] 30 tablet 2     Sig: TAKE 1 TABLET BY MOUTH DAILY

## 2024-11-05 ENCOUNTER — OFFICE VISIT (OUTPATIENT)
Dept: FAMILY MEDICINE CLINIC | Age: 84
End: 2024-11-05

## 2024-11-05 VITALS
WEIGHT: 128 LBS | HEIGHT: 63 IN | BODY MASS INDEX: 22.68 KG/M2 | SYSTOLIC BLOOD PRESSURE: 124 MMHG | RESPIRATION RATE: 16 BRPM | DIASTOLIC BLOOD PRESSURE: 74 MMHG | HEART RATE: 73 BPM

## 2024-11-05 DIAGNOSIS — E78.5 DM TYPE 2 WITH DIABETIC DYSLIPIDEMIA (HCC): ICD-10-CM

## 2024-11-05 DIAGNOSIS — I10 PRIMARY HYPERTENSION: ICD-10-CM

## 2024-11-05 DIAGNOSIS — E11.69 DM TYPE 2 WITH DIABETIC DYSLIPIDEMIA (HCC): Primary | ICD-10-CM

## 2024-11-05 DIAGNOSIS — M15.9 OSTEOARTHRITIS OF MULTIPLE JOINTS, UNSPECIFIED OSTEOARTHRITIS TYPE: ICD-10-CM

## 2024-11-05 DIAGNOSIS — E11.69 DM TYPE 2 WITH DIABETIC DYSLIPIDEMIA (HCC): ICD-10-CM

## 2024-11-05 DIAGNOSIS — E78.5 DM TYPE 2 WITH DIABETIC DYSLIPIDEMIA (HCC): Primary | ICD-10-CM

## 2024-11-05 LAB — HBA1C MFR BLD: 6.6 %

## 2024-11-05 RX ORDER — OMEPRAZOLE 40 MG/1
CAPSULE, DELAYED RELEASE ORAL
COMMUNITY
Start: 2024-10-30

## 2024-11-05 RX ORDER — LINACLOTIDE 72 UG/1
CAPSULE, GELATIN COATED ORAL
COMMUNITY
Start: 2024-10-30

## 2024-11-05 RX ORDER — ALENDRONATE SODIUM 70 MG/1
70 TABLET ORAL
COMMUNITY

## 2024-11-05 ASSESSMENT — ENCOUNTER SYMPTOMS
DIARRHEA: 0
COUGH: 0
BACK PAIN: 1
ABDOMINAL DISTENTION: 0
ABDOMINAL PAIN: 0
COLOR CHANGE: 0
TROUBLE SWALLOWING: 0
NAUSEA: 0
RESPIRATORY NEGATIVE: 1
GASTROINTESTINAL NEGATIVE: 1
CHEST TIGHTNESS: 0
SHORTNESS OF BREATH: 0
VOMITING: 0
CONSTIPATION: 0
SORE THROAT: 0
WHEEZING: 0

## 2024-11-05 NOTE — PROGRESS NOTES
for ear pain, hearing loss, sore throat and trouble swallowing.    Respiratory: Negative.  Negative for cough, chest tightness, shortness of breath and wheezing.    Cardiovascular: Negative.  Negative for chest pain, palpitations and leg swelling.   Gastrointestinal: Negative.  Negative for abdominal distention, abdominal pain, constipation, diarrhea, nausea and vomiting.   Genitourinary:  Negative for difficulty urinating.   Musculoskeletal:  Positive for arthralgias (generalized joint aches) and back pain (left lower back). Negative for gait problem, joint swelling and myalgias.   Skin: Negative.  Negative for color change, rash and wound.   Neurological: Negative.  Negative for dizziness, syncope, facial asymmetry, weakness, light-headedness, numbness and headaches.   Hematological:  Negative for adenopathy.        HISTORIES  Current Outpatient Medications on File Prior to Visit   Medication Sig Dispense Refill    metFORMIN (GLUCOPHAGE-XR) 500 MG extended release tablet TAKE 1 TABLET BY MOUTH DAILY WITH BREAKFAST 30 tablet 2    linagliptin (TRADJENTA) 5 MG tablet TAKE 1 TABLET BY MOUTH DAILY 30 tablet 1    atorvastatin (LIPITOR) 80 MG tablet TAKE ONE TABLET BY MOUTH EVERY NIGHT AT BEDTIME 90 tablet 1    QUEtiapine (SEROQUEL) 50 MG tablet Take 2 tablets by mouth at bedtime 180 tablet 1    metoprolol succinate (TOPROL XL) 100 MG extended release tablet TAKE ONE TABLET BY MOUTH ONCE NIGHTLY 90 tablet 2    losartan (COZAAR) 100 MG tablet TAKE 1 TABLET BY MOUTH DAILY 90 tablet 1    magnesium oxide (MAG-OX) 400 (240 Mg) MG tablet TAKE 1 TABLET BY MOUTH DAILY 30 tablet 5    fenofibrate (TRIGLIDE) 160 MG tablet TAKE ONE TABLET BY MOUTH DAILY 90 tablet 3    amLODIPine (NORVASC) 10 MG tablet TAKE ONE TABLET BY MOUTH DAILY 90 tablet 3    clobetasol (TEMOVATE) 0.05 % cream Apply topically 2 times daily. (Patient taking differently: as needed) 30 g 2    diclofenac sodium (VOLTAREN) 1 % GEL Apply 2 g topically 4 times daily

## 2024-11-06 DIAGNOSIS — N18.32 STAGE 3B CHRONIC KIDNEY DISEASE (HCC): Primary | ICD-10-CM

## 2024-11-06 LAB
ALBUMIN SERPL-MCNC: 4.5 G/DL (ref 3.4–5)
ANION GAP SERPL CALCULATED.3IONS-SCNC: 14 MMOL/L (ref 3–16)
BUN SERPL-MCNC: 22 MG/DL (ref 7–20)
CALCIUM SERPL-MCNC: 10.4 MG/DL (ref 8.3–10.6)
CHLORIDE SERPL-SCNC: 98 MMOL/L (ref 99–110)
CO2 SERPL-SCNC: 25 MMOL/L (ref 21–32)
CREAT SERPL-MCNC: 1.4 MG/DL (ref 0.6–1.2)
GFR SERPLBLD CREATININE-BSD FMLA CKD-EPI: 37 ML/MIN/{1.73_M2}
GLUCOSE SERPL-MCNC: 144 MG/DL (ref 70–99)
PHOSPHATE SERPL-MCNC: 3.6 MG/DL (ref 2.5–4.9)
POTASSIUM SERPL-SCNC: 3.7 MMOL/L (ref 3.5–5.1)
SODIUM SERPL-SCNC: 137 MMOL/L (ref 136–145)

## 2024-11-20 DIAGNOSIS — N18.32 STAGE 3B CHRONIC KIDNEY DISEASE (HCC): ICD-10-CM

## 2024-11-21 LAB
ALBUMIN SERPL-MCNC: 4.6 G/DL (ref 3.4–5)
ANION GAP SERPL CALCULATED.3IONS-SCNC: 19 MMOL/L (ref 3–16)
BUN SERPL-MCNC: 19 MG/DL (ref 7–20)
CALCIUM SERPL-MCNC: 10.3 MG/DL (ref 8.3–10.6)
CHLORIDE SERPL-SCNC: 99 MMOL/L (ref 99–110)
CO2 SERPL-SCNC: 25 MMOL/L (ref 21–32)
CREAT SERPL-MCNC: 1.2 MG/DL (ref 0.6–1.2)
GFR SERPLBLD CREATININE-BSD FMLA CKD-EPI: 45 ML/MIN/{1.73_M2}
GLUCOSE SERPL-MCNC: 115 MG/DL (ref 70–99)
PHOSPHATE SERPL-MCNC: 3.4 MG/DL (ref 2.5–4.9)
POTASSIUM SERPL-SCNC: 4 MMOL/L (ref 3.5–5.1)
SODIUM SERPL-SCNC: 143 MMOL/L (ref 136–145)

## 2024-11-27 DIAGNOSIS — R19.7 DIARRHEA, UNSPECIFIED TYPE: Primary | ICD-10-CM

## 2024-12-07 NOTE — TELEPHONE ENCOUNTER
----- Message from Huy Nunez MD sent at 12/9/2021 12:49 PM EST -----  Labs good except for elevated . Want < 200. Is she taking fenofibrate 160mg daily? Needs to watch diet closely and check with PCP to make sure not diabetic. Elevated TG can occur in diabetics. Orientation: alert and oriented x4    Vitals/Tele: 6LPM NC, bradycardic at times (50's), BP elevated, remains afebrile    IV Access/drains: PIV SL, purewick    Diet: renal- 1200ml fluid restriction    Mobility: R AKA- can transition to wheelchair and reposition in bed by herself.     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

## 2025-01-15 DIAGNOSIS — E11.22 TYPE 2 DM WITH CKD STAGE 3 AND HYPERTENSION (HCC): ICD-10-CM

## 2025-01-15 DIAGNOSIS — I12.9 TYPE 2 DM WITH CKD STAGE 3 AND HYPERTENSION (HCC): ICD-10-CM

## 2025-01-15 DIAGNOSIS — N18.30 TYPE 2 DM WITH CKD STAGE 3 AND HYPERTENSION (HCC): ICD-10-CM

## 2025-01-15 DIAGNOSIS — E11.65 UNCONTROLLED TYPE 2 DIABETES MELLITUS WITH HYPERGLYCEMIA (HCC): ICD-10-CM

## 2025-01-15 RX ORDER — METFORMIN HYDROCHLORIDE 500 MG/1
500 TABLET, EXTENDED RELEASE ORAL DAILY
Qty: 30 TABLET | Refills: 2 | Status: SHIPPED | OUTPATIENT
Start: 2025-01-15

## 2025-01-15 NOTE — TELEPHONE ENCOUNTER
Refill Request     CONFIRM preferred pharmacy with the patient.    If Mail Order Rx - Pend for 90 day refill.      Last Seen: Last Seen Department: 11/5/2024  Last Seen by PCP: 9/5/2024    Last Written: 10/18/24 30 with 2 refills     If no future appointment scheduled:  Review the last OV with PCP and review information for follow-up visit,  Route STAFF MESSAGE with patient name to the  Pool for scheduling with the following information:            -  Timing of next visit           -  Visit type ie Physical, OV, etc           -  Diagnoses/Reason ie. COPD, HTN - Do not use MEDICATION, Follow-up or CHECK UP - Give reason for visit      Next Appointment:   Future Appointments   Date Time Provider Department Center   5/6/2025 10:00 AM Lynda Blank MD [facility] E[facility] DEP       Message sent to  to schedule appt with patient?  NO      Requested Prescriptions     Pending Prescriptions Disp Refills    metFORMIN (GLUCOPHAGE-XR) 500 MG extended release tablet [Pharmacy Med Name: METFORMIN HCL  MG TABLET] 30 tablet 2     Sig: TAKE 1 TABLET BY MOUTH DAILY WITH BREAKFAST

## 2025-01-20 DIAGNOSIS — E78.2 MIXED HYPERLIPIDEMIA: ICD-10-CM

## 2025-01-21 RX ORDER — ALENDRONATE SODIUM 70 MG/1
70 TABLET ORAL
Qty: 12 TABLET | Refills: 1 | Status: SHIPPED | OUTPATIENT
Start: 2025-01-21

## 2025-01-21 RX ORDER — OMEPRAZOLE 40 MG/1
40 CAPSULE, DELAYED RELEASE ORAL DAILY
Qty: 90 CAPSULE | Refills: 1 | Status: SHIPPED | OUTPATIENT
Start: 2025-01-21 | End: 2025-07-20

## 2025-01-21 RX ORDER — LOSARTAN POTASSIUM 100 MG/1
100 TABLET ORAL DAILY
Qty: 90 TABLET | Refills: 1 | Status: SHIPPED | OUTPATIENT
Start: 2025-01-21

## 2025-01-21 RX ORDER — AMLODIPINE BESYLATE 10 MG/1
TABLET ORAL
Qty: 90 TABLET | Refills: 1 | Status: SHIPPED | OUTPATIENT
Start: 2025-01-21

## 2025-01-24 ENCOUNTER — OFFICE VISIT (OUTPATIENT)
Dept: FAMILY MEDICINE CLINIC | Age: 85
End: 2025-01-24
Payer: MEDICARE

## 2025-01-24 VITALS
WEIGHT: 123.6 LBS | HEIGHT: 63 IN | RESPIRATION RATE: 16 BRPM | DIASTOLIC BLOOD PRESSURE: 60 MMHG | HEART RATE: 74 BPM | OXYGEN SATURATION: 97 % | SYSTOLIC BLOOD PRESSURE: 100 MMHG | TEMPERATURE: 97 F | BODY MASS INDEX: 21.9 KG/M2

## 2025-01-24 DIAGNOSIS — N18.30 TYPE 2 DM WITH CKD STAGE 3 AND HYPERTENSION (HCC): ICD-10-CM

## 2025-01-24 DIAGNOSIS — R12 HEARTBURN: Primary | ICD-10-CM

## 2025-01-24 DIAGNOSIS — E11.22 TYPE 2 DM WITH CKD STAGE 3 AND HYPERTENSION (HCC): ICD-10-CM

## 2025-01-24 DIAGNOSIS — I12.9 TYPE 2 DM WITH CKD STAGE 3 AND HYPERTENSION (HCC): ICD-10-CM

## 2025-01-24 PROCEDURE — 3074F SYST BP LT 130 MM HG: CPT | Performed by: NURSE PRACTITIONER

## 2025-01-24 PROCEDURE — 1123F ACP DISCUSS/DSCN MKR DOCD: CPT | Performed by: NURSE PRACTITIONER

## 2025-01-24 PROCEDURE — 3078F DIAST BP <80 MM HG: CPT | Performed by: NURSE PRACTITIONER

## 2025-01-24 PROCEDURE — 1159F MED LIST DOCD IN RCRD: CPT | Performed by: NURSE PRACTITIONER

## 2025-01-24 PROCEDURE — 99214 OFFICE O/P EST MOD 30 MIN: CPT | Performed by: NURSE PRACTITIONER

## 2025-01-24 RX ORDER — FAMOTIDINE 20 MG/1
20 TABLET, FILM COATED ORAL DAILY
Qty: 30 TABLET | Refills: 0 | Status: SHIPPED | OUTPATIENT
Start: 2025-01-24

## 2025-01-24 RX ORDER — POLYETHYLENE GLYCOL 3350 17 G/17G
17 POWDER, FOR SOLUTION ORAL DAILY PRN
COMMUNITY

## 2025-01-24 SDOH — ECONOMIC STABILITY: FOOD INSECURITY: WITHIN THE PAST 12 MONTHS, THE FOOD YOU BOUGHT JUST DIDN'T LAST AND YOU DIDN'T HAVE MONEY TO GET MORE.: NEVER TRUE

## 2025-01-24 SDOH — ECONOMIC STABILITY: FOOD INSECURITY: WITHIN THE PAST 12 MONTHS, YOU WORRIED THAT YOUR FOOD WOULD RUN OUT BEFORE YOU GOT MONEY TO BUY MORE.: NEVER TRUE

## 2025-01-24 ASSESSMENT — PATIENT HEALTH QUESTIONNAIRE - PHQ9
4. FEELING TIRED OR HAVING LITTLE ENERGY: NOT AT ALL
1. LITTLE INTEREST OR PLEASURE IN DOING THINGS: NOT AT ALL
7. TROUBLE CONCENTRATING ON THINGS, SUCH AS READING THE NEWSPAPER OR WATCHING TELEVISION: NOT AT ALL
3. TROUBLE FALLING OR STAYING ASLEEP: NOT AT ALL
SUM OF ALL RESPONSES TO PHQ QUESTIONS 1-9: 0
9. THOUGHTS THAT YOU WOULD BE BETTER OFF DEAD, OR OF HURTING YOURSELF: NOT AT ALL
6. FEELING BAD ABOUT YOURSELF - OR THAT YOU ARE A FAILURE OR HAVE LET YOURSELF OR YOUR FAMILY DOWN: NOT AT ALL
5. POOR APPETITE OR OVEREATING: NOT AT ALL
SUM OF ALL RESPONSES TO PHQ9 QUESTIONS 1 & 2: 0
2. FEELING DOWN, DEPRESSED OR HOPELESS: NOT AT ALL
8. MOVING OR SPEAKING SO SLOWLY THAT OTHER PEOPLE COULD HAVE NOTICED. OR THE OPPOSITE, BEING SO FIGETY OR RESTLESS THAT YOU HAVE BEEN MOVING AROUND A LOT MORE THAN USUAL: NOT AT ALL
10. IF YOU CHECKED OFF ANY PROBLEMS, HOW DIFFICULT HAVE THESE PROBLEMS MADE IT FOR YOU TO DO YOUR WORK, TAKE CARE OF THINGS AT HOME, OR GET ALONG WITH OTHER PEOPLE: NOT DIFFICULT AT ALL
SUM OF ALL RESPONSES TO PHQ QUESTIONS 1-9: 0

## 2025-01-24 ASSESSMENT — ANXIETY QUESTIONNAIRES
3. WORRYING TOO MUCH ABOUT DIFFERENT THINGS: NOT AT ALL
IF YOU CHECKED OFF ANY PROBLEMS ON THIS QUESTIONNAIRE, HOW DIFFICULT HAVE THESE PROBLEMS MADE IT FOR YOU TO DO YOUR WORK, TAKE CARE OF THINGS AT HOME, OR GET ALONG WITH OTHER PEOPLE: NOT DIFFICULT AT ALL
2. NOT BEING ABLE TO STOP OR CONTROL WORRYING: NOT AT ALL
1. FEELING NERVOUS, ANXIOUS, OR ON EDGE: NOT AT ALL
4. TROUBLE RELAXING: NOT AT ALL
7. FEELING AFRAID AS IF SOMETHING AWFUL MIGHT HAPPEN: NOT AT ALL
6. BECOMING EASILY ANNOYED OR IRRITABLE: NOT AT ALL
5. BEING SO RESTLESS THAT IT IS HARD TO SIT STILL: NOT AT ALL
GAD7 TOTAL SCORE: 0

## 2025-01-24 NOTE — PROGRESS NOTES
[facility]  Established care visit   2025    Jada Crane (:  1940) is a 84 y.o. female    Chief Complaint   Patient presents with    stomach issues      Patient co acid reflux with everything she eats.   Patient states last nigh she had an episode where she could feel from her stomach to mouth and states her mouth was on fire.   Fatigue         ASSESSMENT/ PLAN  1. Heartburn    - famotidine (PEPCID) 20 MG tablet; Take 1 tablet by mouth daily  Dispense: 30 tablet; Refill: 0    Recommend:  Try changing your eating habits.  Try eating several small meals instead of two or three large meals.  After you eat, wait 2 to 3 hours before you lie down. Snacking close to bedtime might make your symptoms worse.  Avoid foods that make your symptoms worse. These may include chocolate, mint, alcohol, pepper, spicy foods, high-fat foods, or drinks with caffeine in them, such as tea, coffee, julia, or energy drinks.  If you get dyspepsia at night, you can try raising the head of your bed 6 to 8 inches by putting the frame on blocks or placing a foam wedge under the head of your mattress. (Adding extra pillows usually won't help.)  Try to avoid wearing tight clothing around your middle.    Advised to seek immediate medical care if:    You have new or worse belly pain.     Your stools are black and look like tar or have streaks of blood.   Watch closely for changes in your health, and be sure to contact your doctor if:    You are vomiting.     You have new or worse symptoms of indigestion.     You have trouble or pain swallowing.     You are losing weight.     You do not get better as expected.     -Advised to f/u with Dr. Colindres with GI if symptoms persist     2. 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

## 2025-02-04 ENCOUNTER — OFFICE VISIT (OUTPATIENT)
Dept: FAMILY MEDICINE CLINIC | Age: 85
End: 2025-02-04

## 2025-02-04 ENCOUNTER — HOSPITAL ENCOUNTER (OUTPATIENT)
Dept: GENERAL RADIOLOGY | Age: 85
Discharge: HOME | End: 2025-02-04
Payer: MEDICARE

## 2025-02-04 VITALS
RESPIRATION RATE: 18 BRPM | TEMPERATURE: 98 F | BODY MASS INDEX: 21.83 KG/M2 | DIASTOLIC BLOOD PRESSURE: 58 MMHG | HEIGHT: 63 IN | WEIGHT: 123.2 LBS | OXYGEN SATURATION: 97 % | HEART RATE: 87 BPM | SYSTOLIC BLOOD PRESSURE: 112 MMHG

## 2025-02-04 DIAGNOSIS — I12.9 TYPE 2 DM WITH CKD STAGE 3 AND HYPERTENSION (HCC): ICD-10-CM

## 2025-02-04 DIAGNOSIS — R05.8 PRODUCTIVE COUGH: ICD-10-CM

## 2025-02-04 DIAGNOSIS — E11.22 TYPE 2 DM WITH CKD STAGE 3 AND HYPERTENSION (HCC): ICD-10-CM

## 2025-02-04 DIAGNOSIS — J06.9 VIRAL URI: ICD-10-CM

## 2025-02-04 DIAGNOSIS — N18.30 TYPE 2 DM WITH CKD STAGE 3 AND HYPERTENSION (HCC): ICD-10-CM

## 2025-02-04 DIAGNOSIS — R05.8 PRODUCTIVE COUGH: Primary | ICD-10-CM

## 2025-02-04 LAB
INFLUENZA A ANTIBODY: NEGATIVE
INFLUENZA B ANTIBODY: NEGATIVE

## 2025-02-04 PROCEDURE — 71046 X-RAY EXAM CHEST 2 VIEWS: CPT

## 2025-02-04 SDOH — ECONOMIC STABILITY: FOOD INSECURITY: WITHIN THE PAST 12 MONTHS, YOU WORRIED THAT YOUR FOOD WOULD RUN OUT BEFORE YOU GOT MONEY TO BUY MORE.: NEVER TRUE

## 2025-02-04 SDOH — ECONOMIC STABILITY: FOOD INSECURITY: WITHIN THE PAST 12 MONTHS, THE FOOD YOU BOUGHT JUST DIDN'T LAST AND YOU DIDN'T HAVE MONEY TO GET MORE.: NEVER TRUE

## 2025-02-04 ASSESSMENT — ENCOUNTER SYMPTOMS
SINUS PRESSURE: 0
FACIAL SWELLING: 0
VOICE CHANGE: 0
SINUS PAIN: 0
ABDOMINAL PAIN: 0
SORE THROAT: 0
COUGH: 1
CHEST TIGHTNESS: 0
DIARRHEA: 0
RHINORRHEA: 1
VOMITING: 0
NAUSEA: 0
GASTROINTESTINAL NEGATIVE: 1
WHEEZING: 0
COLOR CHANGE: 0
TROUBLE SWALLOWING: 0
CONSTIPATION: 0
ABDOMINAL DISTENTION: 0
BLOOD IN STOOL: 0
SHORTNESS OF BREATH: 0

## 2025-02-04 ASSESSMENT — PATIENT HEALTH QUESTIONNAIRE - PHQ9
SUM OF ALL RESPONSES TO PHQ QUESTIONS 1-9: 0
1. LITTLE INTEREST OR PLEASURE IN DOING THINGS: NOT AT ALL
SUM OF ALL RESPONSES TO PHQ QUESTIONS 1-9: 0
SUM OF ALL RESPONSES TO PHQ QUESTIONS 1-9: 0
2. FEELING DOWN, DEPRESSED OR HOPELESS: NOT AT ALL
SUM OF ALL RESPONSES TO PHQ QUESTIONS 1-9: 0
SUM OF ALL RESPONSES TO PHQ9 QUESTIONS 1 & 2: 0

## 2025-02-04 NOTE — PROGRESS NOTES
\"Have you been to the ER, urgent care clinic since your last visit?  Hospitalized since your last visit?\"    NO    “Have you seen or consulted any other health care providers outside our system since your last visit?”    NO             
reviewed.    Emile Meza, APRN - CNP

## 2025-02-05 LAB — SARS-COV-2 RNA RESP QL NAA+PROBE: NOT DETECTED

## 2025-02-19 DIAGNOSIS — R12 HEARTBURN: ICD-10-CM

## 2025-02-19 RX ORDER — FAMOTIDINE 20 MG/1
20 TABLET, FILM COATED ORAL DAILY
Qty: 90 TABLET | Refills: 1 | Status: SHIPPED | OUTPATIENT
Start: 2025-02-19

## 2025-02-19 NOTE — TELEPHONE ENCOUNTER
Refill Request     CONFIRM preferred pharmacy with the patient.    If Mail Order Rx - Pend for 90 day refill.      Last Seen: Last Seen Department: 2/4/2025  Last Seen by PCP: 2/4/2025    Last Written: 01/24/2025 30 tab 0 refills     If no future appointment scheduled:  Review the last OV with PCP and review information for follow-up visit,  Route STAFF MESSAGE with patient name to the  Pool for scheduling with the following information:            -  Timing of next visit           -  Visit type ie Physical, OV, etc           -  Diagnoses/Reason ie. COPD, HTN - Do not use MEDICATION, Follow-up or CHECK UP - Give reason for visit      Next Appointment:   Future Appointments   Date Time Provider Department Center   5/6/2025 10:00 AM Lynda Blank MD [facility] DEP       Message sent to  to schedule appt with patient?  NO      Requested Prescriptions     Pending Prescriptions Disp Refills    famotidine (PEPCID) 20 MG tablet [Pharmacy Med Name: FAMOTIDINE 20 MG TABLET] 30 tablet 0     Sig: TAKE 1 TABLET BY MOUTH DAILY

## 2025-03-04 RX ORDER — LOSARTAN POTASSIUM 100 MG/1
100 TABLET ORAL DAILY
Qty: 90 TABLET | Refills: 1 | Status: SHIPPED | OUTPATIENT
Start: 2025-03-04

## 2025-03-04 NOTE — TELEPHONE ENCOUNTER
Refill Request     CONFIRM preferred pharmacy with the patient.    If Mail Order Rx - Pend for 90 day refill.      Last Seen: Last Seen Department: 2/4/2025  Last Seen by PCP: 9/5/2024    Last Written: 1/21/25 #90 - 1 refill     If no future appointment scheduled:  Review the last OV with PCP and review information for follow-up visit,  Route STAFF MESSAGE with patient name to the  Pool for scheduling with the following information:            -  Timing of next visit           -  Visit type ie Physical, OV, etc           -  Diagnoses/Reason ie. COPD, HTN - Do not use MEDICATION, Follow-up or CHECK UP - Give reason for visit      Next Appointment:   Future Appointments   Date Time Provider Department Center   5/6/2025 10:00 AM Lynda Blank MD [facility] DEP       Message sent to  to schedule appt with patient?  NO      Requested Prescriptions     Pending Prescriptions Disp Refills    losartan (COZAAR) 100 MG tablet [Pharmacy Med Name: LOSARTAN POTASSIUM 100 MG TAB] 90 tablet 1     Sig: TAKE 1 TABLET BY MOUTH DAILY

## 2025-03-26 DIAGNOSIS — E78.2 MIXED HYPERLIPIDEMIA: ICD-10-CM

## 2025-03-26 RX ORDER — FENOFIBRATE 160 MG/1
160 TABLET ORAL DAILY
Qty: 90 TABLET | Refills: 0 | Status: SHIPPED | OUTPATIENT
Start: 2025-03-26

## 2025-03-26 NOTE — TELEPHONE ENCOUNTER
Attempted to call pt, no answer. LMOVM for pt to return call back. Pt needs follow up with [facility].       Last Office Visit: 6/4/2024 Provider: YESSENIA  **Is provider OOT? No    Next Office Visit: Visit date not found Provider: YESSENIA  **If no OV, when does pt need to be seen? N/a  **Has patient already had 30 day supply? No    Lab orders needed? no   Encounter provider correct? Yes If not, change provider  Script changes since last refill? no    LAST LABS:   Lipid:   Lab Results   Component Value Date    CHOL 130 05/07/2024    TRIG 235 (H) 05/07/2024    HDL 30 (L) 05/07/2024    LDL 53 05/07/2024    VLDL 47 05/07/2024

## 2025-04-04 DIAGNOSIS — F51.04 CHRONIC INSOMNIA: ICD-10-CM

## 2025-04-04 RX ORDER — QUETIAPINE FUMARATE 50 MG/1
100 TABLET, FILM COATED ORAL NIGHTLY
Qty: 180 TABLET | Refills: 0 | Status: SHIPPED | OUTPATIENT
Start: 2025-04-04

## 2025-04-04 NOTE — TELEPHONE ENCOUNTER
Refill Request - Controlled Substance    CONFIRM preferred pharmacy with the patient.    If Mail Order Rx - Pend for 90 day refill.        Last Seen Department: 2/4/2025  Last Seen by PCP: 9/5/2024    Last Written:  10/01/24, quetiapine 50 mg, 180 tab, refills 1    Last UDS:  N/A    Med Agreement Signed On: N/A    If no future appointment scheduled:  Review the last OV with PCP and review information for follow-up visit,  Route STAFF MESSAGE with patient name to the  Pool for scheduling with the following information:            -  Timing of next visit           -  Visit type ie Physical, OV, etc           -  Diagnoses/Reason ie. COPD, HTN - Do not use MEDICATION, Follow-up or CHECK UP - Give reason for visit        Next Appointment:   Future Appointments   Date Time Provider Department Center   5/6/2025 10:00 AM Lynda Blank MD [facility]   9/4/2025 10:15 AM Chalo Newton MD [facility]       Message sent to  to schedule appt with patient?  NO      Requested Prescriptions     Pending Prescriptions Disp Refills    QUEtiapine (SEROQUEL) 50 MG tablet 180 tablet 1     Sig: Take 2 tablets by mouth at bedtime

## 2025-04-07 DIAGNOSIS — E78.2 MIXED HYPERLIPIDEMIA: ICD-10-CM

## 2025-04-07 RX ORDER — ATORVASTATIN CALCIUM 80 MG/1
80 TABLET, FILM COATED ORAL NIGHTLY
Qty: 90 TABLET | Refills: 0 | Status: SHIPPED | OUTPATIENT
Start: 2025-04-07

## 2025-04-07 NOTE — TELEPHONE ENCOUNTER
Refill Request     CONFIRM preferred pharmacy with the patient.    If Mail Order Rx - Pend for 90 day refill.      Last Seen: Last Seen Department: 2/4/2025  Last Seen by PCP: 9/5/2024    Last Written: 10/4/24 #90 - 1 refill    If no future appointment scheduled:  Review the last OV with PCP and review information for follow-up visit,  Route STAFF MESSAGE with patient name to the  Pool for scheduling with the following information:            -  Timing of next visit           -  Visit type ie Physical, OV, etc           -  Diagnoses/Reason ie. COPD, HTN - Do not use MEDICATION, Follow-up or CHECK UP - Give reason for visit      Next Appointment:   Future Appointments   Date Time Provider Department Center   5/6/2025 10:00 AM Lynda Blank MD [facility]   9/4/2025 10:15 AM Chalo [facility] MD [facility]       Message sent to  to schedule appt with patient?  NO      Requested Prescriptions     Pending Prescriptions Disp Refills    atorvastatin (LIPITOR) 80 MG tablet 90 tablet 1     Sig: Take 1 tablet by mouth nightly

## 2025-04-30 DIAGNOSIS — E11.22 TYPE 2 DM WITH CKD STAGE 3 AND HYPERTENSION (HCC): ICD-10-CM

## 2025-04-30 DIAGNOSIS — N18.30 TYPE 2 DM WITH CKD STAGE 3 AND HYPERTENSION (HCC): ICD-10-CM

## 2025-04-30 DIAGNOSIS — I12.9 TYPE 2 DM WITH CKD STAGE 3 AND HYPERTENSION (HCC): ICD-10-CM

## 2025-04-30 DIAGNOSIS — E11.65 UNCONTROLLED TYPE 2 DIABETES MELLITUS WITH HYPERGLYCEMIA (HCC): ICD-10-CM

## 2025-04-30 NOTE — TELEPHONE ENCOUNTER
Refill Request     CONFIRM preferred pharmacy with the patient.    If Mail Order Rx - Pend for 90 day refill.      Last Seen: Last Seen Department: 2/4/2025  Last Seen by PCP: 9/5/2024    Last Written: 1/15/25 #30 - 2 refills     If no future appointment scheduled:  Review the last OV with PCP and review information for follow-up visit,  Route STAFF MESSAGE with patient name to the  Pool for scheduling with the following information:            -  Timing of next visit           -  Visit type ie Physical, OV, etc           -  Diagnoses/Reason ie. COPD, HTN - Do not use MEDICATION, Follow-up or CHECK UP - Give reason for visit      Next Appointment:   Future Appointments   Date Time Provider Department Center   5/6/2025 10:00 AM Lynda Blank MD [facility]   9/4/2025 10:15 AM Chalo Newton MD [facility]       Message sent to  to schedule appt with patient?  NO      Requested Prescriptions     Pending Prescriptions Disp Refills    metFORMIN (GLUCOPHAGE-XR) 500 MG extended release tablet 30 tablet 2     Sig: Take 1 tablet by mouth daily

## 2025-05-02 RX ORDER — METFORMIN HYDROCHLORIDE 500 MG/1
500 TABLET, EXTENDED RELEASE ORAL DAILY
Qty: 30 TABLET | Refills: 2 | Status: SHIPPED | OUTPATIENT
Start: 2025-05-02

## 2025-05-05 RX ORDER — ASPIRIN 81 MG/1
1 TABLET ORAL
COMMUNITY
End: 2025-05-06 | Stop reason: SDUPTHER

## 2025-05-05 RX ORDER — PAROXETINE 20 MG/1
TABLET, FILM COATED ORAL
COMMUNITY
End: 2025-05-06

## 2025-05-06 ENCOUNTER — OFFICE VISIT (OUTPATIENT)
Dept: FAMILY MEDICINE CLINIC | Age: 85
End: 2025-05-06
Payer: MEDICARE

## 2025-05-06 VITALS
WEIGHT: 124.4 LBS | BODY MASS INDEX: 22.04 KG/M2 | HEIGHT: 63 IN | SYSTOLIC BLOOD PRESSURE: 100 MMHG | DIASTOLIC BLOOD PRESSURE: 52 MMHG | OXYGEN SATURATION: 97 % | TEMPERATURE: 96.9 F | HEART RATE: 60 BPM

## 2025-05-06 DIAGNOSIS — M85.852 OSTEOPENIA OF NECK OF LEFT FEMUR: ICD-10-CM

## 2025-05-06 DIAGNOSIS — E11.22 TYPE 2 DM WITH CKD STAGE 3 AND HYPERTENSION (HCC): ICD-10-CM

## 2025-05-06 DIAGNOSIS — I65.22 STENOSIS OF LEFT CAROTID ARTERY: ICD-10-CM

## 2025-05-06 DIAGNOSIS — K21.9 GASTROESOPHAGEAL REFLUX DISEASE WITHOUT ESOPHAGITIS: ICD-10-CM

## 2025-05-06 DIAGNOSIS — E11.22 TYPE 2 DM WITH CKD STAGE 3 AND HYPERTENSION (HCC): Primary | ICD-10-CM

## 2025-05-06 DIAGNOSIS — I25.10 CORONARY ARTERY DISEASE INVOLVING NATIVE CORONARY ARTERY OF NATIVE HEART WITHOUT ANGINA PECTORIS: ICD-10-CM

## 2025-05-06 DIAGNOSIS — E78.2 MIXED HYPERLIPIDEMIA: ICD-10-CM

## 2025-05-06 DIAGNOSIS — N18.32 STAGE 3B CHRONIC KIDNEY DISEASE (HCC): ICD-10-CM

## 2025-05-06 DIAGNOSIS — N18.30 TYPE 2 DM WITH CKD STAGE 3 AND HYPERTENSION (HCC): ICD-10-CM

## 2025-05-06 DIAGNOSIS — K59.09 CHRONIC CONSTIPATION: ICD-10-CM

## 2025-05-06 DIAGNOSIS — I12.9 TYPE 2 DM WITH CKD STAGE 3 AND HYPERTENSION (HCC): Primary | ICD-10-CM

## 2025-05-06 DIAGNOSIS — I12.9 TYPE 2 DM WITH CKD STAGE 3 AND HYPERTENSION (HCC): ICD-10-CM

## 2025-05-06 DIAGNOSIS — N18.30 TYPE 2 DM WITH CKD STAGE 3 AND HYPERTENSION (HCC): Primary | ICD-10-CM

## 2025-05-06 DIAGNOSIS — M25.471 RIGHT ANKLE SWELLING: ICD-10-CM

## 2025-05-06 LAB — HBA1C MFR BLD: 5.7 %

## 2025-05-06 PROCEDURE — 1160F RVW MEDS BY RX/DR IN RCRD: CPT | Performed by: FAMILY MEDICINE

## 2025-05-06 PROCEDURE — 3044F HG A1C LEVEL LT 7.0%: CPT | Performed by: FAMILY MEDICINE

## 2025-05-06 PROCEDURE — 99214 OFFICE O/P EST MOD 30 MIN: CPT | Performed by: FAMILY MEDICINE

## 2025-05-06 PROCEDURE — 1123F ACP DISCUSS/DSCN MKR DOCD: CPT | Performed by: FAMILY MEDICINE

## 2025-05-06 PROCEDURE — 3074F SYST BP LT 130 MM HG: CPT | Performed by: FAMILY MEDICINE

## 2025-05-06 PROCEDURE — G2211 COMPLEX E/M VISIT ADD ON: HCPCS | Performed by: FAMILY MEDICINE

## 2025-05-06 PROCEDURE — 1159F MED LIST DOCD IN RCRD: CPT | Performed by: FAMILY MEDICINE

## 2025-05-06 PROCEDURE — 3078F DIAST BP <80 MM HG: CPT | Performed by: FAMILY MEDICINE

## 2025-05-06 PROCEDURE — 83036 HEMOGLOBIN GLYCOSYLATED A1C: CPT | Performed by: FAMILY MEDICINE

## 2025-05-06 RX ORDER — ASPIRIN 81 MG/1
81 TABLET ORAL DAILY
COMMUNITY
Start: 2025-05-06

## 2025-05-06 SDOH — ECONOMIC STABILITY: FOOD INSECURITY: WITHIN THE PAST 12 MONTHS, THE FOOD YOU BOUGHT JUST DIDN'T LAST AND YOU DIDN'T HAVE MONEY TO GET MORE.: NEVER TRUE

## 2025-05-06 SDOH — ECONOMIC STABILITY: FOOD INSECURITY: WITHIN THE PAST 12 MONTHS, YOU WORRIED THAT YOUR FOOD WOULD RUN OUT BEFORE YOU GOT MONEY TO BUY MORE.: NEVER TRUE

## 2025-05-06 ASSESSMENT — PATIENT HEALTH QUESTIONNAIRE - PHQ9
SUM OF ALL RESPONSES TO PHQ QUESTIONS 1-9: 0
SUM OF ALL RESPONSES TO PHQ QUESTIONS 1-9: 0
3. TROUBLE FALLING OR STAYING ASLEEP: NOT AT ALL
7. TROUBLE CONCENTRATING ON THINGS, SUCH AS READING THE NEWSPAPER OR WATCHING TELEVISION: NOT AT ALL
5. POOR APPETITE OR OVEREATING: NOT AT ALL
10. IF YOU CHECKED OFF ANY PROBLEMS, HOW DIFFICULT HAVE THESE PROBLEMS MADE IT FOR YOU TO DO YOUR WORK, TAKE CARE OF THINGS AT HOME, OR GET ALONG WITH OTHER PEOPLE: NOT DIFFICULT AT ALL
2. FEELING DOWN, DEPRESSED OR HOPELESS: NOT AT ALL
1. LITTLE INTEREST OR PLEASURE IN DOING THINGS: NOT AT ALL
SUM OF ALL RESPONSES TO PHQ QUESTIONS 1-9: 0
SUM OF ALL RESPONSES TO PHQ QUESTIONS 1-9: 0
8. MOVING OR SPEAKING SO SLOWLY THAT OTHER PEOPLE COULD HAVE NOTICED. OR THE OPPOSITE, BEING SO FIGETY OR RESTLESS THAT YOU HAVE BEEN MOVING AROUND A LOT MORE THAN USUAL: NOT AT ALL
4. FEELING TIRED OR HAVING LITTLE ENERGY: NOT AT ALL
6. FEELING BAD ABOUT YOURSELF - OR THAT YOU ARE A FAILURE OR HAVE LET YOURSELF OR YOUR FAMILY DOWN: NOT AT ALL
9. THOUGHTS THAT YOU WOULD BE BETTER OFF DEAD, OR OF HURTING YOURSELF: NOT AT ALL

## 2025-05-06 ASSESSMENT — ANXIETY QUESTIONNAIRES
6. BECOMING EASILY ANNOYED OR IRRITABLE: NOT AT ALL
5. BEING SO RESTLESS THAT IT IS HARD TO SIT STILL: NOT AT ALL
1. FEELING NERVOUS, ANXIOUS, OR ON EDGE: NOT AT ALL
IF YOU CHECKED OFF ANY PROBLEMS ON THIS QUESTIONNAIRE, HOW DIFFICULT HAVE THESE PROBLEMS MADE IT FOR YOU TO DO YOUR WORK, TAKE CARE OF THINGS AT HOME, OR GET ALONG WITH OTHER PEOPLE: NOT DIFFICULT AT ALL
7. FEELING AFRAID AS IF SOMETHING AWFUL MIGHT HAPPEN: NOT AT ALL
2. NOT BEING ABLE TO STOP OR CONTROL WORRYING: NOT AT ALL
GAD7 TOTAL SCORE: 0
4. TROUBLE RELAXING: NOT AT ALL
3. WORRYING TOO MUCH ABOUT DIFFERENT THINGS: NOT AT ALL

## 2025-05-06 ASSESSMENT — LIFESTYLE VARIABLES
HOW MANY STANDARD DRINKS CONTAINING ALCOHOL DO YOU HAVE ON A TYPICAL DAY: 1 OR 2
HOW OFTEN DO YOU HAVE A DRINK CONTAINING ALCOHOL: 2-3 TIMES A WEEK

## 2025-05-06 NOTE — ASSESSMENT & PLAN NOTE
Chronic, at goal (stable), changes made today: restart 81 mg aspirin daily    Orders:    CBC with Auto Differential; Future    Comprehensive Metabolic Panel; Future    Lipid Panel; Future    Magnesium; Future    Vitamin B12; Future    TSH reflex to FT4; Future    Vitamin D 25 Hydroxy; Future

## 2025-05-06 NOTE — ASSESSMENT & PLAN NOTE
Chronic, at goal (stable), continue current treatment plan with alendronate    Orders:    Magnesium; Future    Vitamin D 25 Hydroxy; Future

## 2025-05-06 NOTE — ASSESSMENT & PLAN NOTE
Chronic, at goal (stable), changes made today: STOP METFORMIN    Orders:    CBC with Auto Differential; Future    Comprehensive Metabolic Panel; Future    Lipid Panel; Future    Magnesium; Future    Vitamin B12; Future    TSH reflex to FT4; Future    Vitamin D 25 Hydroxy; Future

## 2025-05-06 NOTE — PROGRESS NOTES
Jada Crane (:  1940) is a 85 y.o. 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)   Chronic, at goal (stable), changes made today:    - A1c level at 5.7, indicating well-controlled blood glucose levels  - Discontinue metformin while maintaining daily Tradjenta regimen  - Comprehensive panel of laboratory tests to assess cholesterol, kidney function, liver function, and complete blood count to be conducted today  - Foot examination performed today    Orders:    POCT glycosylated hemoglobin (Hb A1C)    Albumin/Creatinine Ratio, Urine    HM DIABETES FOOT EXAM    CBC with Auto Differential; Future    Comprehensive Metabolic Panel; Future    Lipid Panel; Future    Magnesium; Future    Vitamin B12; Future    TSH reflex to FT4; Future    Vitamin D 25 Hydroxy; Future    Mixed hyperlipidemia   Chronic, at goal (stable), continue current plan pending work up below    Orders:    CBC with Auto Differential; Future    Comprehensive Metabolic Panel; Future    Lipid Panel; Future    Magnesium; Future    Vitamin B12; Future    TSH reflex to FT4; Future    Vitamin D 25 Hydroxy; Future    Stage 3b chronic kidney disease (HCC)   Chronic, at goal (stable), changes made today: STOP METFORMIN    Orders:    CBC with Auto Differential; Future    Comprehensive Metabolic Panel; Future    Lipid Panel; Future    Magnesium; Future    Vitamin B12; Future    TSH reflex to FT4; Future    Vitamin D 25 Hydroxy; Future    Coronary artery disease involving native coronary artery of native heart without angina pectoris   Chronic, at goal (stable), changes made today: restart 81 mg aspirin daily    Orders:    CBC with Auto Differential; Future    Comprehensive Metabolic Panel; Future

## 2025-05-06 NOTE — PROGRESS NOTES
Patient came back and dropped urine   Urine micro colloected and sent to the lab for testing.   Labeled and placed in bag with orders.   (ALL URINE CX TO BE PLACED IN THE FRIDGE)

## 2025-05-06 NOTE — ASSESSMENT & PLAN NOTE
Chronic, at goal (stable), continue current plan pending work up below    Orders:    CBC with Auto Differential; Future    Comprehensive Metabolic Panel; Future    Lipid Panel; Future    Magnesium; Future    Vitamin B12; Future    TSH reflex to FT4; Future    Vitamin D 25 Hydroxy; Future

## 2025-05-06 NOTE — ASSESSMENT & PLAN NOTE
Chronic, at goal (stable), changes made today:    - A1c level at 5.7, indicating well-controlled blood glucose levels  - Discontinue metformin while maintaining daily Tradjenta regimen  - Comprehensive panel of laboratory tests to assess cholesterol, kidney function, liver function, and complete blood count to be conducted today  - Foot examination performed today    Orders:    POCT glycosylated hemoglobin (Hb A1C)    Albumin/Creatinine Ratio, Urine    HM DIABETES FOOT EXAM    CBC with Auto Differential; Future    Comprehensive Metabolic Panel; Future    Lipid Panel; Future    Magnesium; Future    Vitamin B12; Future    TSH reflex to FT4; Future    Vitamin D 25 Hydroxy; Future

## 2025-05-07 ENCOUNTER — RESULTS FOLLOW-UP (OUTPATIENT)
Dept: FAMILY MEDICINE CLINIC | Age: 85
End: 2025-05-07

## 2025-05-07 LAB
25(OH)D3 SERPL-MCNC: 34.7 NG/ML
ALBUMIN SERPL-MCNC: 4.2 G/DL (ref 3.4–5)
ALBUMIN/GLOB SERPL: 2.1 {RATIO} (ref 1.1–2.2)
ALP SERPL-CCNC: 46 U/L (ref 40–129)
ALT SERPL-CCNC: 22 U/L (ref 10–40)
ANION GAP SERPL CALCULATED.3IONS-SCNC: 10 MMOL/L (ref 3–16)
AST SERPL-CCNC: 30 U/L (ref 15–37)
BASOPHILS # BLD: 0 K/UL (ref 0–0.2)
BASOPHILS NFR BLD: 0.7 %
BILIRUB SERPL-MCNC: 0.4 MG/DL (ref 0–1)
BUN SERPL-MCNC: 25 MG/DL (ref 7–20)
CALCIUM SERPL-MCNC: 10.2 MG/DL (ref 8.3–10.6)
CHLORIDE SERPL-SCNC: 105 MMOL/L (ref 99–110)
CHOLEST SERPL-MCNC: 144 MG/DL (ref 0–199)
CO2 SERPL-SCNC: 27 MMOL/L (ref 21–32)
CREAT SERPL-MCNC: 1.3 MG/DL (ref 0.6–1.2)
CREAT UR-MCNC: 130 MG/DL (ref 28–259)
DEPRECATED RDW RBC AUTO: 15.2 % (ref 12.4–15.4)
EOSINOPHIL # BLD: 0.2 K/UL (ref 0–0.6)
EOSINOPHIL NFR BLD: 3.6 %
GFR SERPLBLD CREATININE-BSD FMLA CKD-EPI: 41 ML/MIN/{1.73_M2}
GLUCOSE SERPL-MCNC: 137 MG/DL (ref 70–99)
HCT VFR BLD AUTO: 37.8 % (ref 36–48)
HDLC SERPL-MCNC: 23 MG/DL (ref 40–60)
HGB BLD-MCNC: 12.9 G/DL (ref 12–16)
LDLC SERPL CALC-MCNC: 64 MG/DL
LYMPHOCYTES # BLD: 1.4 K/UL (ref 1–5.1)
LYMPHOCYTES NFR BLD: 29.5 %
MAGNESIUM SERPL-MCNC: 1.8 MG/DL (ref 1.8–2.4)
MCH RBC QN AUTO: 29.8 PG (ref 26–34)
MCHC RBC AUTO-ENTMCNC: 34.1 G/DL (ref 31–36)
MCV RBC AUTO: 87.5 FL (ref 80–100)
MICROALBUMIN UR DL<=1MG/L-MCNC: 3.85 MG/DL
MICROALBUMIN/CREAT UR: 29.6 MG/G (ref 0–30)
MONOCYTES # BLD: 0.3 K/UL (ref 0–1.3)
MONOCYTES NFR BLD: 6.1 %
NEUTROPHILS # BLD: 2.8 K/UL (ref 1.7–7.7)
NEUTROPHILS NFR BLD: 60.1 %
PLATELET # BLD AUTO: 228 K/UL (ref 135–450)
PMV BLD AUTO: 8.2 FL (ref 5–10.5)
POTASSIUM SERPL-SCNC: 4.5 MMOL/L (ref 3.5–5.1)
PROT SERPL-MCNC: 6.2 G/DL (ref 6.4–8.2)
RBC # BLD AUTO: 4.32 M/UL (ref 4–5.2)
SODIUM SERPL-SCNC: 142 MMOL/L (ref 136–145)
TRIGL SERPL-MCNC: 283 MG/DL (ref 0–150)
TSH SERPL DL<=0.005 MIU/L-ACNC: 2.41 UIU/ML (ref 0.27–4.2)
VIT B12 SERPL-MCNC: 351 PG/ML (ref 211–911)
VLDLC SERPL CALC-MCNC: 57 MG/DL
WBC # BLD AUTO: 4.7 K/UL (ref 4–11)

## 2025-05-16 ENCOUNTER — TELEPHONE (OUTPATIENT)
Dept: FAMILY MEDICINE CLINIC | Age: 85
End: 2025-05-16

## 2025-05-16 NOTE — TELEPHONE ENCOUNTER
Incoming call regarding edema, this is not a new problem as reported by patient. She was recently seen and did have some edema noted by pcp, right ankle has worsening.     Denies pain or redness. States this has happened in the past. Declined same day appointment with a different provider, patient only wishes to see Dr. Blank.    Scheduled for next available.    Advised to use her compression socks, rest and elevate as much as possible. Advised if worsens, develops redness or pain needs treatment in ED. Voiced understanding.

## 2025-05-22 ENCOUNTER — OFFICE VISIT (OUTPATIENT)
Dept: FAMILY MEDICINE CLINIC | Age: 85
End: 2025-05-22
Payer: MEDICARE

## 2025-05-22 VITALS
BODY MASS INDEX: 22.61 KG/M2 | SYSTOLIC BLOOD PRESSURE: 128 MMHG | OXYGEN SATURATION: 98 % | WEIGHT: 127.6 LBS | TEMPERATURE: 97.9 F | HEIGHT: 63 IN | HEART RATE: 66 BPM | DIASTOLIC BLOOD PRESSURE: 42 MMHG

## 2025-05-22 DIAGNOSIS — R60.0 LEG EDEMA: Primary | ICD-10-CM

## 2025-05-22 PROCEDURE — 3078F DIAST BP <80 MM HG: CPT | Performed by: FAMILY MEDICINE

## 2025-05-22 PROCEDURE — 99212 OFFICE O/P EST SF 10 MIN: CPT | Performed by: FAMILY MEDICINE

## 2025-05-22 PROCEDURE — 1160F RVW MEDS BY RX/DR IN RCRD: CPT | Performed by: FAMILY MEDICINE

## 2025-05-22 PROCEDURE — 3074F SYST BP LT 130 MM HG: CPT | Performed by: FAMILY MEDICINE

## 2025-05-22 PROCEDURE — 1123F ACP DISCUSS/DSCN MKR DOCD: CPT | Performed by: FAMILY MEDICINE

## 2025-05-22 PROCEDURE — 1159F MED LIST DOCD IN RCRD: CPT | Performed by: FAMILY MEDICINE

## 2025-05-22 NOTE — PROGRESS NOTES
Jada Crane (:  1940) is a 85 y.o. female established patient, here for evaluation of the following chief complaint(s):  Chief Complaint   Patient presents with    Edema     Right leg   States her legs has been off and on  She states she has noticed weight gain over last few weeks  States this swelling is her entire right lower leg , knee down  Denies warmth or redness , SOB           Assessment & Plan  Leg edema  - Symptoms do not align with a diagnosis of deep vein thrombosis (DVT)  - Possible causes include:    - Venous insufficiency    - Lymphatic system dysfunction    - Damage to lymphatic channels from previous injury    - Increased salt intake, excessive water consumption, exposure to heat, or prolonged standing  - Advised use of compression stockings during episodes of edema  - If edema recurs:    - May prescribe a diuretic for a few days to facilitate fluid excretion - consider low dose torsemide if needed given her CKD    - Contact clinic if edema persists          Jada Crane is a 85 y.o. female here today for had concerns including Edema (Right leg /States her legs has been off and on/She states she has noticed weight gain over last few weeks/States this swelling is her entire right lower leg , knee down/Denies warmth or redness , SOB/).    History of Present Illness  The patient presents for evaluation of right leg swelling.    She has been experiencing intermittent episodes of right leg swelling, with the most recent episode lasting from Thursday to . The severity of the swelling was such that it extended to the side of her leg, prompting her to seek medical attention due to concerns about a potential blood clot. She reports a sensation of tightness in the affected leg but does not experience any associated tingling. The swelling is predominantly localized to the right leg, with no significant involvement of the left leg. She also reports persistent coldness in her hands and

## 2025-06-05 DIAGNOSIS — I10 ESSENTIAL HYPERTENSION: ICD-10-CM

## 2025-06-05 RX ORDER — METOPROLOL SUCCINATE 100 MG/1
100 TABLET, EXTENDED RELEASE ORAL NIGHTLY
Qty: 90 TABLET | Refills: 2 | Status: SHIPPED | OUTPATIENT
Start: 2025-06-05

## 2025-06-21 DIAGNOSIS — E78.2 MIXED HYPERLIPIDEMIA: ICD-10-CM

## 2025-06-23 RX ORDER — FENOFIBRATE 160 MG/1
160 TABLET ORAL DAILY
Qty: 90 TABLET | Refills: 0 | Status: SHIPPED | OUTPATIENT
Start: 2025-06-23

## 2025-06-23 NOTE — TELEPHONE ENCOUNTER
Last Office Visit: 6/4/2024 Provider: YESSENIA  **Is provider OOT? No    Next Office Visit: 9/4/2025 Provider: YESSENIA      LAST LABS:   Lipid:  Lab Results   Component Value Date    CHOL 144 05/06/2025    TRIG 283 (H) 05/06/2025    HDL 23 (L) 05/06/2025    LDL 64 05/06/2025    VLDL 57 05/06/2025

## 2025-06-29 DIAGNOSIS — F51.04 CHRONIC INSOMNIA: ICD-10-CM

## 2025-06-30 RX ORDER — QUETIAPINE FUMARATE 50 MG/1
100 TABLET, FILM COATED ORAL NIGHTLY
Qty: 180 TABLET | Refills: 0 | Status: SHIPPED | OUTPATIENT
Start: 2025-06-30

## 2025-06-30 NOTE — TELEPHONE ENCOUNTER
Refill Request     CONFIRM preferred pharmacy with the patient.    If Mail Order Rx - Pend for 90 day refill.      Last Seen: Last Seen Department: 5/22/2025  Last Seen by PCP: 2/4/2025    Last Written: 4/4/25 180 with no refills     If no future appointment scheduled:  Review the last OV with PCP and review information for follow-up visit,  Route STAFF MESSAGE with patient name to the  Pool for scheduling with the following information:            -  Timing of next visit           -  Visit type ie Physical, OV, etc           -  Diagnoses/Reason ie. COPD, HTN - Do not use MEDICATION, Follow-up or CHECK UP - Give reason for visit      Next Appointment:   Future Appointments   Date Time Provider Department Center   9/4/2025 10:15 AM Chalo [facility] MD [facility]   11/6/2025 10:[facility]       Message sent to  to schedule appt with patient?  NO      Requested Prescriptions     Pending Prescriptions Disp Refills    QUEtiapine (SEROQUEL) 50 MG tablet [Pharmacy Med Name: QUEtiapine FUMARATE 50 MG TAB] 180 tablet 0     Sig: TAKE 2 TABLETS BY MOUTH AT BEDTIME

## 2025-07-03 DIAGNOSIS — E78.2 MIXED HYPERLIPIDEMIA: ICD-10-CM

## 2025-07-03 RX ORDER — ATORVASTATIN CALCIUM 80 MG/1
TABLET, FILM COATED ORAL
Qty: 90 TABLET | Refills: 1 | Status: SHIPPED | OUTPATIENT
Start: 2025-07-03

## 2025-07-03 RX ORDER — ALENDRONATE SODIUM 70 MG/1
TABLET ORAL
Qty: 12 TABLET | Refills: 1 | Status: SHIPPED | OUTPATIENT
Start: 2025-07-03

## 2025-07-03 NOTE — TELEPHONE ENCOUNTER
Refill Request     CONFIRM preferred pharmacy with the patient.    If Mail Order Rx - Pend for 90 day refill.      Last Seen: Last Seen Department: 5/22/2025  Last Seen by PCP: 5/22/2025    Last Written: 1/21/25 12 with 1 refill     If no future appointment scheduled:  Review the last OV with PCP and review information for follow-up visit,  Route STAFF MESSAGE with patient name to the  Pool for scheduling with the following information:            -  Timing of next visit           -  Visit type ie Physical, OV, etc           -  Diagnoses/Reason ie. COPD, HTN - Do not use MEDICATION, Follow-up or CHECK UP - Give reason for visit      Next Appointment:   Future Appointments   Date Time Provider Department Center   9/4/2025 10:15 AM Chalo Newton MD [facility]   11/6/2025 10:00 [facility]       Message sent to  to schedule appt with patient?  NO      Requested Prescriptions     Pending Prescriptions Disp Refills    alendronate (FOSAMAX) 70 MG tablet [Pharmacy Med Name: ALENDRONATE SODIUM 70 MG TAB] 12 tablet 1     Sig: TAKE 1 TABLET BY MOUTH ONCE WEEKLY ON AN EMPTY STOMACH BEFORE BREAKFAST. REMAIN UPRIGHT FOR 30 MINUTES AND TAKE WITH 8 OUNCES OF WATER .

## 2025-07-03 NOTE — TELEPHONE ENCOUNTER
Refill Request     CONFIRM preferred pharmacy with the patient.    If Mail Order Rx - Pend for 90 day refill.      Last Seen: Last Seen Department: 5/22/2025  Last Seen by PCP: 2/4/2025    Last Written: 4/7/25 90 with no refills     If no future appointment scheduled:  Review the last OV with PCP and review information for follow-up visit,  Route STAFF MESSAGE with patient name to the  Pool for scheduling with the following information:            -  Timing of next visit           -  Visit type ie Physical, OV, etc           -  Diagnoses/Reason ie. COPD, HTN - Do not use MEDICATION, Follow-up or CHECK UP - Give reason for visit      Next Appointment:   Future Appointments   Date Time Provider Department Center   9/4/2025 10:15 AM Chalo Newton MD [facility]   11/6/2025 10:00 [facility]       Message sent to  to schedule appt with patient?  NO      Requested Prescriptions     Pending Prescriptions Disp Refills    atorvastatin (LIPITOR) 80 MG tablet [Pharmacy Med Name: ATORVASTATIN 80 MG TABLET] 90 tablet 0     Sig: TAKE 1 TABLET BY MOUTH ONCE NIGHTLY

## 2025-07-10 RX ORDER — LINAGLIPTIN 5 MG/1
5 TABLET, FILM COATED ORAL DAILY
Qty: 90 TABLET | Refills: 1 | Status: SHIPPED | OUTPATIENT
Start: 2025-07-10

## 2025-07-10 NOTE — TELEPHONE ENCOUNTER
Refill Request     CONFIRM preferred pharmacy with the patient.    If Mail Order Rx - Pend for 90 day refill.      Last Seen: Last Seen Department: 5/22/2025  Last Seen by PCP: 5/22/2025    Last Written: 1/21/25 90 with 1 refill     If no future appointment scheduled:  Review the last OV with PCP and review information for follow-up visit,  Route STAFF MESSAGE with patient name to the  Pool for scheduling with the following information:            -  Timing of next visit           -  Visit type ie Physical, OV, etc           -  Diagnoses/Reason ie. COPD, HTN - Do not use MEDICATION, Follow-up or CHECK UP - Give reason for visit      Next Appointment:   Future Appointments   Date Time Provider Department Center   9/4/2025 10:15 AM Chalo Newton MD [facility]   11/6/2025 10:00 [facility]       Message sent to  to schedule appt with patient?  NO      Requested Prescriptions     Pending Prescriptions Disp Refills    TRADJENTA 5 MG tablet [Pharmacy Med Name: TRADJENTA 5 MG TABLET] 90 tablet 1     Sig: TAKE 1 TABLET BY MOUTH DAILY

## 2025-07-17 RX ORDER — OMEPRAZOLE 40 MG/1
40 CAPSULE, DELAYED RELEASE ORAL DAILY
Qty: 90 CAPSULE | Refills: 1 | Status: SHIPPED | OUTPATIENT
Start: 2025-07-17 | End: 2026-01-13

## 2025-07-17 NOTE — TELEPHONE ENCOUNTER
Refill Request     CONFIRM preferred pharmacy with the patient.    If Mail Order Rx - Pend for 90 day refill.      Last Seen: Last Seen Department: 5/22/2025  Last Seen by PCP: 5/22/2025    Last Written: 1/21/25 90 with 1 refill     If no future appointment scheduled:  Review the last OV with PCP and review information for follow-up visit,  Route STAFF MESSAGE with patient name to the  Pool for scheduling with the following information:            -  Timing of next visit           -  Visit type ie Physical, OV, etc           -  Diagnoses/Reason ie. COPD, HTN - Do not use MEDICATION, Follow-up or CHECK UP - Give reason for visit      Next Appointment:   Future Appointments   Date Time Provider Department Center   9/4/2025 10:15 AM Chalo Newton MD [facility]   11/6/2025 10:00 [facility]       Message sent to  to schedule appt with patient?  NO      Requested Prescriptions     Pending Prescriptions Disp Refills    omeprazole (PRILOSEC) 40 MG delayed release capsule [Pharmacy Med Name: OMEPRAZOLE DR 40 MG CAPSULE] 90 capsule 1     Sig: TAKE 1 CAPSULE BY MOUTH DAILY

## 2025-07-21 ENCOUNTER — TELEPHONE (OUTPATIENT)
Dept: FAMILY MEDICINE CLINIC | Age: 85
End: 2025-07-21

## 2025-07-21 NOTE — TELEPHONE ENCOUNTER
Called patient , she is due for a medicare wellness visit   I can set her up on a vv/telephone visit with Deysi on a Thursday and she can address chronic issues with Dr Blank at 11.06.25 visit   You can transfer the patient to me when they call back [phone]

## 2025-09-04 ENCOUNTER — OFFICE VISIT (OUTPATIENT)
Dept: CARDIOLOGY CLINIC | Age: 85
End: 2025-09-04
Payer: MEDICARE

## 2025-09-04 VITALS
BODY MASS INDEX: 22.95 KG/M2 | SYSTOLIC BLOOD PRESSURE: 128 MMHG | WEIGHT: 129.5 LBS | OXYGEN SATURATION: 98 % | DIASTOLIC BLOOD PRESSURE: 62 MMHG | HEART RATE: 78 BPM | HEIGHT: 63 IN

## 2025-09-04 DIAGNOSIS — I10 PRIMARY HYPERTENSION: ICD-10-CM

## 2025-09-04 DIAGNOSIS — E78.5 HYPERLIPIDEMIA, UNSPECIFIED HYPERLIPIDEMIA TYPE: ICD-10-CM

## 2025-09-04 DIAGNOSIS — I25.10 CORONARY ARTERY DISEASE INVOLVING NATIVE CORONARY ARTERY OF NATIVE HEART WITHOUT ANGINA PECTORIS: ICD-10-CM

## 2025-09-04 DIAGNOSIS — I65.21 STENOSIS OF RIGHT CAROTID ARTERY: Primary | ICD-10-CM

## 2025-09-04 PROCEDURE — 99214 OFFICE O/P EST MOD 30 MIN: CPT | Performed by: INTERNAL MEDICINE

## 2025-09-04 PROCEDURE — 3078F DIAST BP <80 MM HG: CPT | Performed by: INTERNAL MEDICINE

## 2025-09-04 PROCEDURE — 93000 ELECTROCARDIOGRAM COMPLETE: CPT | Performed by: INTERNAL MEDICINE

## 2025-09-04 PROCEDURE — 1159F MED LIST DOCD IN RCRD: CPT | Performed by: INTERNAL MEDICINE

## 2025-09-04 PROCEDURE — 1123F ACP DISCUSS/DSCN MKR DOCD: CPT | Performed by: INTERNAL MEDICINE

## 2025-09-04 PROCEDURE — 3074F SYST BP LT 130 MM HG: CPT | Performed by: INTERNAL MEDICINE

## (undated) DEVICE — CONMED SCOPE SAVER BITE BLOCK, 20X27 MM: Brand: SCOPE SAVER

## (undated) DEVICE — ENDOSCOPIC KIT 2 12 FT OP4 DE2 GWN SYR

## (undated) DEVICE — ELECTRODE ECG MONITR FOAM TEAR DROP ADLT RED

## (undated) DEVICE — FORCEPS BX L240CM JAW DIA2.8MM L CAP W/ NDL MIC MESH TOOTH